# Patient Record
Sex: MALE | Employment: OTHER
[De-identification: names, ages, dates, MRNs, and addresses within clinical notes are randomized per-mention and may not be internally consistent; named-entity substitution may affect disease eponyms.]

---

## 2017-03-02 ENCOUNTER — RECORDS - HEALTHEAST (OUTPATIENT)
Dept: ADMINISTRATIVE | Facility: OTHER | Age: 77
End: 2017-03-02

## 2017-05-09 ENCOUNTER — RECORDS - HEALTHEAST (OUTPATIENT)
Dept: ADMINISTRATIVE | Facility: OTHER | Age: 77
End: 2017-05-09

## 2017-11-22 ENCOUNTER — TRANSFERRED RECORDS (OUTPATIENT)
Dept: HEALTH INFORMATION MANAGEMENT | Facility: CLINIC | Age: 77
End: 2017-11-22

## 2017-11-30 ENCOUNTER — HOME CARE/HOSPICE - HEALTHEAST (OUTPATIENT)
Dept: HOME HEALTH SERVICES | Facility: HOME HEALTH | Age: 77
End: 2017-11-30

## 2017-12-02 ENCOUNTER — HOME CARE/HOSPICE - HEALTHEAST (OUTPATIENT)
Dept: HOME HEALTH SERVICES | Facility: HOME HEALTH | Age: 77
End: 2017-12-02

## 2018-01-08 ENCOUNTER — RECORDS - HEALTHEAST (OUTPATIENT)
Dept: ADMINISTRATIVE | Facility: OTHER | Age: 78
End: 2018-01-08

## 2018-02-20 ENCOUNTER — RECORDS - HEALTHEAST (OUTPATIENT)
Dept: ADMINISTRATIVE | Facility: OTHER | Age: 78
End: 2018-02-20

## 2018-07-16 ENCOUNTER — MEDICAL CORRESPONDENCE (OUTPATIENT)
Dept: HEALTH INFORMATION MANAGEMENT | Facility: CLINIC | Age: 78
End: 2018-07-16

## 2018-07-16 ENCOUNTER — TRANSFERRED RECORDS (OUTPATIENT)
Dept: HEALTH INFORMATION MANAGEMENT | Facility: CLINIC | Age: 78
End: 2018-07-16

## 2018-08-20 PROBLEM — I10 HYPERTENSION: Status: ACTIVE | Noted: 2018-08-20

## 2018-08-20 PROBLEM — E78.00 HYPERCHOLESTEROLEMIA: Status: ACTIVE | Noted: 2018-08-20

## 2018-08-20 PROBLEM — Z82.0 FAMILY HISTORY OF PARKINSON DISEASE: Status: ACTIVE | Noted: 2018-08-20

## 2018-08-20 PROBLEM — R25.1 TREMOR: Status: ACTIVE | Noted: 2018-08-20

## 2018-08-20 PROBLEM — G20.A1 PARALYSIS AGITANS (H): Status: ACTIVE | Noted: 2018-08-20

## 2018-08-20 PROBLEM — F41.9 ANXIETY: Status: ACTIVE | Noted: 2018-08-20

## 2018-08-20 RX ORDER — METOPROLOL TARTRATE 25 MG/1
TABLET, FILM COATED ORAL
Qty: 60 TABLET | Refills: 1 | COMMUNITY
Start: 2018-08-20 | End: 2018-08-28

## 2018-08-20 RX ORDER — TAMSULOSIN HYDROCHLORIDE 0.4 MG/1
0.4 CAPSULE ORAL DAILY
Qty: 30 CAPSULE | COMMUNITY
Start: 2018-08-20 | End: 2018-08-28

## 2018-08-20 RX ORDER — CARBIDOPA/LEVODOPA 25MG-250MG
TABLET ORAL
Qty: 270 TABLET | Refills: 3 | COMMUNITY
Start: 2018-08-20 | End: 2018-08-28

## 2018-08-20 RX ORDER — ESCITALOPRAM OXALATE 20 MG/1
7.5 TABLET ORAL DAILY
Qty: 90 TABLET | Refills: 3 | COMMUNITY
Start: 2018-08-20 | End: 2018-08-28

## 2018-08-20 RX ORDER — OMEGA-3 FATTY ACIDS/FISH OIL 300-1000MG
1 CAPSULE ORAL DAILY
Qty: 90 CAPSULE | COMMUNITY
Start: 2018-08-20 | End: 2018-08-28

## 2018-08-20 RX ORDER — ROSUVASTATIN CALCIUM 10 MG/1
20 TABLET, COATED ORAL DAILY
Qty: 90 TABLET | Refills: 3 | COMMUNITY
Start: 2018-08-20 | End: 2018-08-28

## 2018-08-20 RX ORDER — ASPIRIN 325 MG
325 TABLET, DELAYED RELEASE (ENTERIC COATED) ORAL DAILY
Qty: 40 TABLET | COMMUNITY
Start: 2018-08-20 | End: 2018-08-28

## 2018-08-20 RX ORDER — RAMIPRIL 2.5 MG/1
5 CAPSULE ORAL DAILY
Qty: 60 CAPSULE | Refills: 1 | COMMUNITY
Start: 2018-08-20 | End: 2018-08-28

## 2018-08-20 RX ORDER — POTASSIUM CHLORIDE 750 MG/1
TABLET, EXTENDED RELEASE ORAL
Qty: 90 TABLET | Refills: 1 | COMMUNITY
Start: 2018-08-20 | End: 2018-08-28

## 2018-08-20 RX ORDER — FINASTERIDE 5 MG/1
5 TABLET, FILM COATED ORAL DAILY
Qty: 30 TABLET | Refills: 1 | COMMUNITY
Start: 2018-08-20 | End: 2018-08-28

## 2018-08-20 NOTE — PROGRESS NOTES
Summary and Recommendations:     Parkinson's disease  Left side onset    Family history of parkinson in father    Discussed   1. Dopamine scan (DaTSCAN)  2. Genetics - [email or web address] and next generation - Joel Morrissey, genetic counselor.  3. Medication class with Wendy Steve  4. Pharmacy consultation with Roxana Puga, Pharm D  5. Medium set up  6. Keshia krishnan  7. Discussed boxing, exercise research as well as big and loud therapy  8. He will remain on his sinemet at the present dose vs reducing to 1/2 tab 3/days  9. If he has not had an ECG checked recently may want this done to check his QTc interval  10. Return back to see me or Tatum Shankar NP in 3-6months.    Gomez Bingham MD  _____________________________________________________________________  PATIENT: Avi Bassettmulugeta  78 year old male   : 1940  BARTOLOME: 2018    Consult requested by Dr Foley  Outside records reviewed and revealed  - inserted.   History obtained from patient  History of Present Illness  79 yo left handed man with family history of parkinson  Presented with tremor and seemingly has improved on sinemet 25/250 3/day  Medications need to be confirmed in regards to dose.     Not clear if QTc checked    Denies eye problems  Wears glasses  Hearing is okay  Skin - no cancer  Mood - anxiety 0 better with lexapro  Sleep at night is okay  Goes to bathroom 3 times during the night.   Snores  He talks in his sleep  He never had good sense of smell  Had a nose fracture in football.   ?concussion  Blood pressure is well managed. Denies fainting but is lower today  Former smoker  No lung problems  Denies arthritis  Endo - on medication for cholesterol and fasting was okay  No recent thyroid or diabetes screen  Denies constipation or heartburn  Daytime - does not urinary urgency or frequency but does not have nocturia  msk   Renal stones  Renal function is reportedly okay  Heme - denies  Allergies to medications - denies  Has low back issues  - has  had injections for this and chiropractic manipulation, massage and other therapy for this.   He has had a faint in the past - not clear when - he went to the bathroom and thought he was going to vomit and fell. This was about 10 yrs ago.       Dr Foley noticed that he was dragging his left foot and thought it could be Parkinson  There was a reduction in left arm swing  Diagnosed about the 18th of April 2018  His voice reportedly not affected  He has smaller handwriting   Started on medication on the 18th of April   Tremors were intermittent initially and by noon they resolved without medication   With medication he has not had significant tremors most of the time.   Walking and arm swing are good and they walk a mile to 1.5 miles per day  He does monthly acupuncture, massage, boxing and walking, chiropractor.     They were referred from the family of Hever Darling - Natacha is a good friend of their daughter.     They are doing the boxing at [employer] in [place].   They had a difficulty finding boxing.   Wife has a friend in [facility] who has found boxing as helpful for her parkinson's disease symptoms.        Medications     7/730am noon 5-7pm             Aspirin 81mg 1       Carbidopa/levodopa sinemet 25/250 1 1 1     Escitalopram lexapro 20mg 1.5       Finasteride Proscar 5mg 1       Metoprolol tartrate lopressor 25mg  1       non formulary calm sleep powder as needed        Omega 3 1000mg  1       Potassium citrate 2  2     Ramipril altace 5 mg  1       Rosuvastatin crestor 20mg   1     Tamsulosin flomax 0.4mg capsule 1                                                                                                                     14 Review of systems  are negative except for   Patient Active Problem List   Diagnosis     Anxiety     Family history of Parkinson disease     Hypertension     Hypercholesterolemia     Tremor     Paralysis agitans (H)      No Known Allergies  History reviewed. No pertinent  surgical history.  Past Medical History:   Diagnosis Date     Anxiety 8/20/2018     Family history of Parkinson disease 8/20/2018     Hypercholesterolemia 8/20/2018     Hypertension 8/20/2018     Paralysis agitans (H) 8/20/2018     Tremor 8/20/2018     Social History     Social History     Marital status:      Spouse name: N/A     Number of children: N/A     Years of education: N/A     Occupational History     Not on file.     Social History Main Topics     Smoking status: Former Smoker     Smokeless tobacco: Never Used     Alcohol use Yes     Drug use: Not on file     Sexual activity: Not on file     Other Topics Concern     Not on file     Social History Narrative    . LIVES IN [place] [employer]     Family History   Problem Relation Age of Onset     HEART DISEASE Mother      Parkinsonism Father      Diabetes Brother      Current Outpatient Prescriptions   Medication Sig Dispense Refill     aspirin 325 MG EC tablet Take 1 tablet (325 mg) by mouth daily 40 tablet      aspirin 81 MG tablet Take 1 tablet (81 mg) by mouth daily 90 tablet 3     carbidopa-levodopa (SINEMET)  MG per tablet 25/250 TAB BY MOUTH 3/ tablet 3     escitalopram (LEXAPRO) 20 MG tablet Take 1 tablet (20 mg) by mouth daily 90 tablet 3     finasteride (PROSCAR) 5 MG tablet Take 1 tablet (5 mg) by mouth daily 30 tablet 1     metoprolol tartrate (LOPRESSOR) 25 MG tablet DOSE? 60 tablet 1     omega 3 1000 MG CAPS Take 1 g by mouth daily 90 capsule      potassium chloride (K-TAB,KLOR-CON) 10 MEQ tablet DOSE? 90 tablet 1     ramipril (ALTACE) 2.5 MG capsule DOSE? 60 capsule 1     rosuvastatin (CRESTOR) 10 MG tablet DOSE? 90 tablet 3     tamsulosin (FLOMAX) 0.4 MG capsule Take 1 capsule (0.4 mg) by mouth daily 30 capsule      Examination  B/P: Data Unavailable, T: Data Unavailable, P: Data Unavailable, R: Data Unavailable 0 lbs 0 oz  There were no vitals taken for this visit., There is no height or weight on file to  calculate BMI.    Vitals signs were added and reviewed if not above. Please refer to the chart from this visit.    General examination: well developed, nourished and normal affect  Carotid: No bruits. Chest CTA, Heart regular without gallops or murmurs. Abdomen soft nontender, no masses, bowel sounds intact. Periphery: normal pulses without edema. No skin lesions. MENTAL STATUS:  Alert, oriented x3.  Speech fluent with normal naming, repetition, comprehension.  Good right-left orientation, Can remember 1/3 objects.   5/5 on spelling world backwards.  CRANIAL NERVES:  Disks flat. Pupils are equal, round, reactive to light.  Normal vascularity and fields. Extraocular movements full.  Facial sensation and movement normal.  Hearing intact. Palate moves symmetrically.  Tongue midline.  Sternocleidomastoid and trapezius strength intact.  Neck strength was normal.  NEUROLOGIC:  Tone: slight increased tone and mild slowing. Motor in upper and lower extremities. 5/5.  Reflexes 0-1/4.  Toe signs downgoing.  Good finger-nose-finger, fine finger movement, heel-shin maneuver, sensation to light touch, position sense and vibration and temperature was normal. Gait normal except for slight reduced left arm swing. Romberg and postural stability intact.  Tremor - slight tremor

## 2018-08-21 ENCOUNTER — TELEPHONE (OUTPATIENT)
Dept: NEUROLOGY | Facility: CLINIC | Age: 78
End: 2018-08-21

## 2018-08-21 NOTE — TELEPHONE ENCOUNTER
Called Irena back and advised her that Dr. Bingham is okay with the patient waiting to be seen until doing the DATscan imaging.

## 2018-08-21 NOTE — TELEPHONE ENCOUNTER
[facility] Call Center    Phone Message    May a detailed message be left on voicemail: yes    Reason for Call: Order(s): Other:   Reason for requested: Irena needs confirmation about scheduling the order for NM Brain Image Tomographic(Spect) Datscan. She isn't sure if Dr. Bingham wants these tests done for new patients he has not seen before. They are extremely expensive tests and she just wants clarification if she is supposed to schedule these before patient is seen, or if Dr. Bingham is putting in these orders for a future date. She has to call out to get patient scheduled for the scan and patient's are confused about scheduling the test when they have not consulted about it. Please call both the patient and Irena back to confirm. Thank you!  Date needed: N/A  Provider name: Dr. Bingham      Action Taken: Message routed to:  [facility] ([facility] Neurology

## 2018-08-24 NOTE — TELEPHONE ENCOUNTER
FUTURE VISIT INFORMATION      FUTURE VISIT INFORMATION:    Date: 08/28/2018    Time: 9:10 AM     [place] [facility] [place] INFORMATION:    Referring provider:  DR. Foley    Referring providers clinic:      Reason for visit/diagnosis        NOTES (FOR ALL VISITS) STATUS DETAILS   OFFICE NOTE from referring provider Internal 07/16/2018   OFFICE NOTE from other specialist Internal 07/16/2018   DISCHARGE SUMMARY from hospital Care Everywhere 02/19/2014, 08/03/2013   DISCHARGE REPORT from the ER Care Everywhere 11/28/2017   OPERATIVE REPORT Care Everywhere 02/13/2014, 08/12/2013   MEDICATION LIST Internal    IMAGING  (FOR ALL VISITS)     EMG N/A    EEG N/A    ECT N/A    MRI (HEAD, NECK, SPINE) Care Everywhere  PACS  11/22/2017, 09/08/2014,    CT (HEAD, NECK, SPINE)   PACS   Care Everywhere 03/14/2015, 08/27/2014   OTHER

## 2018-08-28 ENCOUNTER — MYC MEDICAL ADVICE (OUTPATIENT)
Dept: NEUROLOGY | Facility: CLINIC | Age: 78
End: 2018-08-28

## 2018-08-28 ENCOUNTER — OFFICE VISIT (OUTPATIENT)
Dept: NEUROLOGY | Facility: CLINIC | Age: 78
End: 2018-08-28
Payer: COMMERCIAL

## 2018-08-28 ENCOUNTER — PRE VISIT (OUTPATIENT)
Dept: NEUROLOGY | Facility: CLINIC | Age: 78
End: 2018-08-28

## 2018-08-28 VITALS
OXYGEN SATURATION: 98 % | SYSTOLIC BLOOD PRESSURE: 100 MMHG | DIASTOLIC BLOOD PRESSURE: 59 MMHG | BODY MASS INDEX: 26.89 KG/M2 | HEART RATE: 68 BPM | TEMPERATURE: 97.6 F | WEIGHT: 202.9 LBS | HEIGHT: 73 IN

## 2018-08-28 DIAGNOSIS — G20.A1 PARALYSIS AGITANS (H): ICD-10-CM

## 2018-08-28 DIAGNOSIS — E78.00 HYPERCHOLESTEROLEMIA: ICD-10-CM

## 2018-08-28 DIAGNOSIS — F41.9 ANXIETY: ICD-10-CM

## 2018-08-28 DIAGNOSIS — G20.C PARKINSONISM, UNSPECIFIED PARKINSONISM TYPE (H): Primary | ICD-10-CM

## 2018-08-28 DIAGNOSIS — Z82.0 FAMILY HISTORY OF PARKINSON DISEASE: ICD-10-CM

## 2018-08-28 DIAGNOSIS — I10 HYPERTENSION, UNSPECIFIED TYPE: ICD-10-CM

## 2018-08-28 PROBLEM — M62.830 SPASM OF BACK MUSCLES: Status: ACTIVE | Noted: 2017-11-29

## 2018-08-28 PROBLEM — M46.1 SACROILIITIS (H): Status: ACTIVE | Noted: 2018-08-28

## 2018-08-28 PROBLEM — E78.9 LIPID DISORDER: Status: ACTIVE | Noted: 2017-02-26

## 2018-08-28 PROBLEM — M54.9 BACKACHE: Status: ACTIVE | Noted: 2017-11-29

## 2018-08-28 PROBLEM — M54.50 LOW BACK PAIN: Status: ACTIVE | Noted: 2017-11-29

## 2018-08-28 PROBLEM — E78.5 HYPERLIPIDEMIA: Status: ACTIVE | Noted: 2018-08-20

## 2018-08-28 RX ORDER — METOPROLOL SUCCINATE 25 MG/1
25 TABLET, EXTENDED RELEASE ORAL
COMMUNITY
Start: 2018-03-31 | End: 2018-08-28

## 2018-08-28 RX ORDER — FINASTERIDE 5 MG/1
TABLET, FILM COATED ORAL
Qty: 90 TABLET | Refills: 3 | COMMUNITY
Start: 2018-08-28

## 2018-08-28 RX ORDER — TAMSULOSIN HYDROCHLORIDE 0.4 MG/1
CAPSULE ORAL
Qty: 30 CAPSULE | COMMUNITY
Start: 2018-08-28 | End: 2020-01-01

## 2018-08-28 RX ORDER — POTASSIUM CITRATE 10 MEQ/1
TABLET, EXTENDED RELEASE ORAL DAILY
Refills: 3 | COMMUNITY
Start: 2018-08-15 | End: 2018-08-28

## 2018-08-28 RX ORDER — CARBIDOPA/LEVODOPA 25MG-250MG
TABLET ORAL
Qty: 270 TABLET | Refills: 3 | COMMUNITY
Start: 2018-08-28 | End: 2019-08-29

## 2018-08-28 RX ORDER — ESCITALOPRAM OXALATE 5 MG/1
TABLET ORAL
Qty: 135 TABLET | Refills: 3 | COMMUNITY
Start: 2018-08-28 | End: 2020-01-01

## 2018-08-28 RX ORDER — METOPROLOL SUCCINATE 25 MG/1
TABLET, EXTENDED RELEASE ORAL
Qty: 90 TABLET | Refills: 3 | COMMUNITY
Start: 2018-08-28 | End: 2019-02-28

## 2018-08-28 RX ORDER — OMEGA-3 FATTY ACIDS/FISH OIL 300-1000MG
CAPSULE ORAL
Qty: 90 CAPSULE | COMMUNITY
Start: 2018-08-28 | End: 2019-02-28

## 2018-08-28 RX ORDER — ROSUVASTATIN CALCIUM 20 MG/1
TABLET, COATED ORAL
COMMUNITY
Start: 2018-08-28

## 2018-08-28 RX ORDER — POTASSIUM CITRATE 10 MEQ/1
TABLET, EXTENDED RELEASE ORAL
Qty: 360 TABLET | Refills: 3 | COMMUNITY
Start: 2018-08-28

## 2018-08-28 RX ORDER — RAMIPRIL 5 MG/1
CAPSULE ORAL
Qty: 90 CAPSULE | Refills: 3 | COMMUNITY
Start: 2018-08-28 | End: 2019-08-29

## 2018-08-28 ASSESSMENT — ENCOUNTER SYMPTOMS
MEMORY LOSS: 1
SEIZURES: 0
PARALYSIS: 0
SPEECH CHANGE: 0
HEADACHES: 0
TINGLING: 0
DIZZINESS: 0
TREMORS: 1
NUMBNESS: 0
LOSS OF CONSCIOUSNESS: 0
DISTURBANCES IN COORDINATION: 0
WEAKNESS: 0

## 2018-08-28 ASSESSMENT — PAIN SCALES - GENERAL: PAINLEVEL: NO PAIN (0)

## 2018-08-28 NOTE — MR AVS SNAPSHOT
After Visit Summary   8/28/2018    Avi Villafana    MRN: [id number]           Patient Information     Date Of Birth          1940        Visit Information        Provider Department      8/28/2018 9:10 AM Gomez Bingham MD [facility] Neurology        Today's Diagnoses     Parkinsonism, unspecified Parkinsonism type (H)    -  1    Anxiety        Family history of Parkinson disease        Hypertension, unspecified type        Hypercholesterolemia        Paralysis agitans (H)          Care Instructions    Parkinson's disease  Left side onset    Family history of parkinson in father    Discussed   1. Dopamine scan (DaTSCAN)  2. Genetics - [email or web address] and next generation - Joel Morrissey, genetic counselor.  3. Medication class with Wendy Steve  4. Pharmacy consultation with Roxana Puga, Pharm D  5. BombBomb set up  6. Shaky putter  7. Discussed boxing, exercise research as well as big and loud therapy  8. He will remain on his sinemet at the present dose vs reducing to 1/2 tab 3/days  9. If he has not had an ECG checked recently may want this done to check his QTc interval  10. Return back to see me or Tatum Shankar NP in 3-6months.    Gomez Bingham MD          Follow-ups after your visit        Additional Services     DME Referral       Order for: Boxing for Parkinson            GENETICS REFERRAL       Your provider has referred you to: Joel Morrissey    Please be aware that coverage of these services is subject to the terms and limitations of your health insurance plan.  Call member services at your health plan with any benefit or coverage questions.      Please bring the following with you to your appointment:    (1) Any X-Rays, CTs or MRIs which have been performed.  Contact the facility where they were done to arrange for  prior to your scheduled appointment.   (2) List of current medications   (3) This referral request   (4) Any documents/labs given to you for this referral            MED THERAPY  MANAGE REFERRAL       Your provider has referred you to: megan lerma  Reason for Referral: Parkinson    The [place] department will contact you to schedule an appointment.  You may also schedule the appointment by calling [phone].  For [facility] patients, please call [phone] to confirm/schedule your appointment on the next business day.    This service is designed to help you get the most from your medications.  A specially trained Pharmacist will work closely with you and your providers to solve any questions, concerns, issues or problems related to your medications.    Please bring all of your prescription and non-prescription medications (such as vitamins, over-the-counter medications, and herbals) or a detailed medication list to your appointment.    If you have a glucose meter or other home monitoring information, please also bring this to your appointment (i.e. blood glucose log, blood pressure log, pain log, etc.).                  Follow-up notes from your care team     Return in about 6 months (around 2/28/2019).      Your next 10 appointments already scheduled     Feb 28, 2019 11:10 AM CST   (Arrive by 10:55 AM)   New Movement Disorder with Gomez Bingham MD   [facility] Neurology ([facility])    [address] [place] [id number] [phone]              Future tests that were ordered for you today     Open Future Orders        Priority Expected Expires Ordered    NM Brain Image Tomographic(Spect) Datscan Routine  8/28/2019 8/28/2018    NM Brain Image Tomographic(Spect) Datscan Routine  8/20/2019 8/20/2018            Who to contact     Please call your clinic at [phone] to:    Ask questions about your health    Make or cancel appointments    Discuss your medicines    Learn about your test results    Speak to your doctor            Additional Information About Your Visit        [facility]  "Information     T[employer]abl gives you secure access to your electronic health record. If you see a primary care provider, you can also send messages to your care team and make appointments. If you have questions, please call your primary care clinic.  If you do not have a primary care provider, please call [phone] and they will assist you.      Trendabl is an electronic gateway that provides easy, online access to your medical records. With [employer], you can request a clinic appointment, read your test results, renew a prescription or communicate with your care team.     To access your existing account, please contact your [facility] or call [phone] for assistance.        Care EveryWhere ID     This is your Care EveryWhere ID. This could be used by other organizations to access your [place] medical records  [id number]        Your Vitals Were     Pulse Temperature Height Pulse Oximetry BMI (Body Mass Index)       68 97.6  F (36.4  C) (Oral) 1.854 m (6' 1\") 98% 26.77 kg/m2        Blood Pressure from Last 3 Encounters:   08/28/18 100/59    Weight from Last 3 Encounters:   08/28/18 92 kg (202 lb 14.4 oz)              We Performed the Following     DME Referral     GENETICS REFERRAL     MED THERAPY MANAGE REFERRAL          Today's Medication Changes          These changes are accurate as of 8/28/18 10:38 AM.  If you have any questions, ask your nurse or doctor.               These medicines have changed or have updated prescriptions.        Dose/Directions    aspirin 81 MG tablet   This may have changed:    - how much to take  - how to take this  - when to take this  - additional instructions  - Another medication with the same name was removed. Continue taking this medication, and follow the directions you see here.   Changed by:  Gomez Bingham MD        81mg tab by mouth daily @ 7/730am   Quantity:  90 tablet   Refills:  3       CRESTOR 20 MG tablet   This may have changed:  "   - medication strength  - how much to take  - how to take this  - when to take this  - additional instructions   Generic drug:  rosuvastatin   Changed by:  Gomez Bingham MD        20mg tab by mouth nightly @ 5-7pm   Refills:  0       escitalopram 5 MG tablet   Commonly known as:  LEXAPRO   This may have changed:    - medication strength  - how much to take  - how to take this  - when to take this  - additional instructions   Changed by:  Gomez Bingham MD        1.5 x 5mg tabs by mouth daily @ 7am  (7.5mg dose/day)   Quantity:  135 tablet   Refills:  3       finasteride 5 MG tablet   Commonly known as:  PROSCAR   This may have changed:    - how much to take  - how to take this  - when to take this  - additional instructions   Changed by:  Gomez Bingham MD        5mg tab by mouth daily @ 7am   Quantity:  90 tablet   Refills:  3       FLOMAX 0.4 MG capsule   This may have changed:    - how much to take  - how to take this  - when to take this  - additional instructions   Generic drug:  tamsulosin   Changed by:  Gomez Bingham MD        0.4mg capsule by mouth daily @ 7/730am   Quantity:  30 capsule   Refills:  0       metoprolol succinate 25 MG 24 hr tablet   Commonly known as:  TOPROL-XL   This may have changed:    - how much to take  - how to take this  - additional instructions   Changed by:  Gomez Bingham MD        25mg tab by mouth daily @ 7am   Quantity:  90 tablet   Refills:  3       omega 3 1000 MG Caps   This may have changed:    - how much to take  - how to take this  - when to take this  - additional instructions   Changed by:  Gomez Bingham MD        1000mg capsule by mouth daily @ 7am   Quantity:  90 capsule   Refills:  0       potassium citrate 10 MEQ (1080 MG) CR tablet   Commonly known as:  UROCIT-K   This may have changed:    - when to take this  - additional instructions   Changed by:  Gomez Bingham MD        2 tabs by mouth twice daily @ 7am and 5-7pm  = 4/day    Quantity:  360 tablet   Refills:  3       ramipril 5 MG capsule   Commonly known as:  ALTACE   This may have changed:    - medication strength  - how much to take  - how to take this  - when to take this  - additional instructions   Changed by:  Gomez Bingham MD        5mg capsule by mouth daily @ 7am   Quantity:  90 capsule   Refills:  3       SINEMET  MG per tablet   This may have changed:  additional instructions   Used for:  Parkinsonism, unspecified Parkinsonism type (H)   Generic drug:  carbidopa-levodopa   Changed by:  Gomez Bingham MD        25/250 TAB BY MOUTH 3/DAY @ 7am, noon and 5-7pm   Quantity:  270 tablet   Refills:  3         Stop taking these medicines if you haven't already. Please contact your care team if you have questions.     metoprolol tartrate 25 MG tablet   Commonly known as:  LOPRESSOR   Stopped by:  Gomez Bingham MD           potassium chloride 10 MEQ tablet   Commonly known as:  K-TAB,KLOR-CON   Stopped by:  Gomez Bingham MD                    Primary Care Provider Office Phone # Fax #    Jed CRAWLEY MD Lizeth [phone]       [facility] [address]  [facility] [address]        Equal Access to Services     [facility] AH: Hadii aad ku hadasho Soomaali, waaxda luqadaha, qaybta kaalmada adeegyada, waxay idiin hayaan adeeg romeoarageorgie nick . So [facility] [phone].    ATENCIÓN: Si habla español, tiene a diaz disposición servicios gratuitos de asistencia lingüística. [facility] [phone].    We comply with applicable federal civil rights laws and [place] laws. We do not discriminate on the basis of race, color, national origin, age, disability, sex, sexual orientation, or gender identity.            Thank you!     Thank you for choosing [facility] NEUROLOGY  for your care. Our goal is always to provide you with excellent care. Hearing back from our patients is one way we can continue to improve our services. Please take a few  minutes to complete the written survey that you may receive in the mail after your visit with us. Thank you!             Your Updated Medication List - Protect others around you: Learn how to safely use, store and throw away your medicines at www.disposemymeds.org.          This list is accurate as of 8/28/18 10:38 AM.  Always use your most recent med list.                   Brand Name Dispense Instructions for use Diagnosis    aspirin 81 MG tablet     90 tablet    81mg tab by mouth daily @ 7/730am        CRESTOR 20 MG tablet   Generic drug:  rosuvastatin      20mg tab by mouth nightly @ 5-7pm        escitalopram 5 MG tablet    LEXAPRO    135 tablet    1.5 x 5mg tabs by mouth daily @ 7am  (7.5mg dose/day)        finasteride 5 MG tablet    PROSCAR    90 tablet    5mg tab by mouth daily @ 7am        FLOMAX 0.4 MG capsule   Generic drug:  tamsulosin     30 capsule    0.4mg capsule by mouth daily @ 7/730am        metoprolol succinate 25 MG 24 hr tablet    TOPROL-XL    90 tablet    25mg tab by mouth daily @ 7am        NONFORMULARY      Calm powder at night as needed for sleep        omega 3 1000 MG Caps     90 capsule    1000mg capsule by mouth daily @ 7am        potassium citrate 10 MEQ (1080 MG) CR tablet    UROCIT-K    360 tablet    2 tabs by mouth twice daily @ 7am and 5-7pm  = 4/day        ramipril 5 MG capsule    ALTACE    90 capsule    5mg capsule by mouth daily @ 7am        SINEMET  MG per tablet   Generic drug:  carbidopa-levodopa     270 tablet    25/250 TAB BY MOUTH 3/DAY @ 7am, noon and 5-7pm    Parkinsonism, unspecified Parkinsonism type (H)

## 2018-08-28 NOTE — NURSING NOTE
Chief Complaint   Patient presents with     Consult     [facility] NEW - MOVEMENT DISORDER     Everett Pickett, EMT

## 2018-08-28 NOTE — PATIENT INSTRUCTIONS
Parkinson's disease  Left side onset    Family history of parkinson in father    Discussed   1. Dopamine scan (DaTSCAN)  2. Genetics - [email or web address] and next generation - Joel Morrissey, genetic counselor.  3. Medication class with Wendy Steve  4. Pharmacy consultation with Roxana Puga, Pharm D  5. Scribz set up  6. Keshia krishnan  7. Discussed boxing, exercise research as well as big and loud therapy  8. He will remain on his sinemet at the present dose vs reducing to 1/2 tab 3/days  9. If he has not had an ECG checked recently may want this done to check his QTc interval  10. Return back to see me or Tatum Shankar NP in 3-6months.    Gomez Bingham MD

## 2018-08-28 NOTE — LETTER
2018       RE: Avi Villafana  [address] [facility] [address]     Dear Colleague,    Thank you for referring your patient, Avi Villafana, to the [facility] NEUROLOGY at [facility]. Please see a copy of my visit note below.    Summary and Recommendations:     Parkinson's disease  Left side onset    Family history of parkinson in father    Discussed   1. Dopamine scan (DaTSCAN)  2. Genetics - [email or web address] and next generation - Joel Morrissey, genetic counselor.  3. Medication class with Wendy Steve  4. Pharmacy consultation with Roxana Puga, Pharm D  5. eSolar set up  6. Shaky putter  7. Discussed boxing, exercise research as well as big and loud therapy  8. He will remain on his sinemet at the present dose vs reducing to 1/2 tab 3/days  9. If he has not had an ECG checked recently may want this done to check his QTc interval  10. Return back to see me or Tatum Shankar NP in 3-6months.    Gomez Bingham MD  _____________________________________________________________________  PATIENT: Avi Villafana  78 year old male   : 1940  BARTOLOME: 2018    Consult requested by Dr Foley  Outside records reviewed and revealed  - inserted.   History obtained from patient  History of Present Illness  77 yo left handed man with family history of parkinson  Presented with tremor and seemingly has improved on sinemet 25/250 3/day  Medications need to be confirmed in regards to dose.     Not clear if QTc checked    Denies eye problems  Wears glasses  Hearing is okay  Skin - no cancer  Mood - anxiety 0 better with lexapro  Sleep at night is okay  Goes to bathroom 3 times during the night.   Snores  He talks in his sleep  He never had good sense of smell  Had a nose fracture in football.   ?concussion  Blood pressure is well managed. Denies fainting but is lower today  Former smoker  No lung problems  Denies arthritis  Endo - on medication for cholesterol and fasting was okay  No  recent thyroid or diabetes screen  Denies constipation or heartburn  Daytime - does not urinary urgency or frequency but does not have nocturia  msk   Renal stones  Renal function is reportedly okay  Heme - denies  Allergies to medications - denies  Has low back issues  - has had injections for this and chiropractic manipulation, massage and other therapy for this.   He has had a faint in the past - not clear when - he went to the bathroom and thought he was going to vomit and fell. This was about 10 yrs ago.       Dr Foley noticed that he was dragging his left foot and thought it could be Parkinson  There was a reduction in left arm swing  Diagnosed about the 18th of April 2018  His voice reportedly not affected  He has smaller handwriting   Started on medication on the 18th of April   Tremors were intermittent initially and by noon they resolved without medication   With medication he has not had significant tremors most of the time.   Walking and arm swing are good and they walk a mile to 1.5 miles per day  He does monthly acupuncture, massage, boxing and walking, chiropractor.     They were referred from the family of Hever Darling - Natacha is a good friend of their daughter.     They are doing the boxing at [employer] in [place].   They had a difficulty finding boxing.   Wife has a friend in [facility] who has found boxing as helpful for her parkinson's disease symptoms.        Medications     7/730am noon 5-7pm             Aspirin 81mg 1       Carbidopa/levodopa sinemet 25/250 1 1 1     Escitalopram lexapro 20mg 1.5       Finasteride Proscar 5mg 1       Metoprolol tartrate lopressor 25mg  1       non formulary calm sleep powder as needed        Omega 3 1000mg  1       Potassium citrate 2  2     Ramipril altace 5 mg  1       Rosuvastatin crestor 20mg   1     Tamsulosin flomax 0.4mg capsule 1                                                                                                                      14 Review of systems  are negative except for   Patient Active Problem List   Diagnosis     Anxiety     Family history of Parkinson disease     Hypertension     Hypercholesterolemia     Tremor     Paralysis agitans (H)      No Known Allergies  History reviewed. No pertinent surgical history.  Past Medical History:   Diagnosis Date     Anxiety 8/20/2018     Family history of Parkinson disease 8/20/2018     Hypercholesterolemia 8/20/2018     Hypertension 8/20/2018     Paralysis agitans (H) 8/20/2018     Tremor 8/20/2018     Social History     Social History     Marital status:      Spouse name: N/A     Number of children: N/A     Years of education: N/A     Occupational History     Not on file.     Social History Main Topics     Smoking status: Former Smoker     Smokeless tobacco: Never Used     Alcohol use Yes     Drug use: Not on file     Sexual activity: Not on file     Other Topics Concern     Not on file     Social History Narrative    . LIVES IN [place]. [employer]     Family History   Problem Relation Age of Onset     HEART DISEASE Mother      Parkinsonism Father      Diabetes Brother      Current Outpatient Prescriptions   Medication Sig Dispense Refill     aspirin 325 MG EC tablet Take 1 tablet (325 mg) by mouth daily 40 tablet      aspirin 81 MG tablet Take 1 tablet (81 mg) by mouth daily 90 tablet 3     carbidopa-levodopa (SINEMET)  MG per tablet 25/250 TAB BY MOUTH 3/ tablet 3     escitalopram (LEXAPRO) 20 MG tablet Take 1 tablet (20 mg) by mouth daily 90 tablet 3     finasteride (PROSCAR) 5 MG tablet Take 1 tablet (5 mg) by mouth daily 30 tablet 1     metoprolol tartrate (LOPRESSOR) 25 MG tablet DOSE? 60 tablet 1     omega 3 1000 MG CAPS Take 1 g by mouth daily 90 capsule      potassium chloride (K-TAB,KLOR-CON) 10 MEQ tablet DOSE? 90 tablet 1     ramipril (ALTACE) 2.5 MG capsule DOSE? 60 capsule 1     rosuvastatin (CRESTOR) 10 MG tablet DOSE? 90 tablet 3      tamsulosin (FLOMAX) 0.4 MG capsule Take 1 capsule (0.4 mg) by mouth daily 30 capsule      Examination  B/P: Data Unavailable, T: Data Unavailable, P: Data Unavailable, R: Data Unavailable 0 lbs 0 oz  There were no vitals taken for this visit., There is no height or weight on file to calculate BMI.    Vitals signs were added and reviewed if not above. Please refer to the chart from this visit.    General examination: well developed, nourished and normal affect  Carotid: No bruits. Chest CTA, Heart regular without gallops or murmurs. Abdomen soft nontender, no masses, bowel sounds intact. Periphery: normal pulses without edema. No skin lesions. MENTAL STATUS:  Alert, oriented x3.  Speech fluent with normal naming, repetition, comprehension.  Good right-left orientation, Can remember 1/3 objects.   5/5 on spelling world backwards.  CRANIAL NERVES:  Disks flat. Pupils are equal, round, reactive to light.  Normal vascularity and fields. Extraocular movements full.  Facial sensation and movement normal.  Hearing intact. Palate moves symmetrically.  Tongue midline.  Sternocleidomastoid and trapezius strength intact.  Neck strength was normal.  NEUROLOGIC:  Tone: slight increased tone and mild slowing. Motor in upper and lower extremities. 5/5.  Reflexes 0-1/4.  Toe signs downgoing.  Good finger-nose-finger, fine finger movement, heel-shin maneuver, sensation to light touch, position sense and vibration and temperature was normal. Gait normal except for slight reduced left arm swing. Romberg and postural stability intact.  Tremor - slight tremor            Again, thank you for allowing me to participate in the care of your patient.      Sincerely,    Gomez Bingham MD

## 2018-08-28 NOTE — Clinical Note
2018       RE: Avi Villafana  [address] [facility] [address]     Dear Colleague,    Thank you for referring your patient, Avi Villafana, to the [facility] NEUROLOGY at [facility]. Please see a copy of my visit note below.    Summary and Recommendations:         Gomez Bingham MD  _____________________________________________________________________  PATIENT: Avi Villafana  78 year old male   : 1940  BARTOLOME: 2018    Consult requested by Dr Foley        Outside records reviewed and revealed  - inserted.       History obtained from patient      History of Present Illness  77 yo man with family history of parkinson  Presented with tremor and seemingly has improved on sinemet 25/250 3/day  Medications need to be confirmed in regards to dose.         Medications                                                                                                                                                                                                                  14 Review of systems  are negative except for   Patient Active Problem List   Diagnosis     Anxiety     Family history of Parkinson disease     Hypertension     Hypercholesterolemia     Tremor     Paralysis agitans (H)      No Known Allergies  History reviewed. No pertinent surgical history.  Past Medical History:   Diagnosis Date     Anxiety 2018     Family history of Parkinson disease 2018     Hypercholesterolemia 2018     Hypertension 2018     Paralysis agitans (H) 2018     Tremor 2018     Social History     Social History     Marital status:      Spouse name: N/A     Number of children: N/A     Years of education: N/A     Occupational History     Not on file.     Social History Main Topics     Smoking status: Former Smoker     Smokeless tobacco: Never Used     Alcohol use Yes     Drug use: Not on file     Sexual activity: Not on file     Other Topics  Concern     Not on file     Social History Narrative    . LIVES IN [place]. [employer]     Family History   Problem Relation Age of Onset     HEART DISEASE Mother      Parkinsonism Father      Diabetes Brother      Current Outpatient Prescriptions   Medication Sig Dispense Refill     aspirin 325 MG EC tablet Take 1 tablet (325 mg) by mouth daily 40 tablet      aspirin 81 MG tablet Take 1 tablet (81 mg) by mouth daily 90 tablet 3     carbidopa-levodopa (SINEMET)  MG per tablet 25/250 TAB BY MOUTH 3/ tablet 3     escitalopram (LEXAPRO) 20 MG tablet Take 1 tablet (20 mg) by mouth daily 90 tablet 3     finasteride (PROSCAR) 5 MG tablet Take 1 tablet (5 mg) by mouth daily 30 tablet 1     metoprolol tartrate (LOPRESSOR) 25 MG tablet DOSE? 60 tablet 1     omega 3 1000 MG CAPS Take 1 g by mouth daily 90 capsule      potassium chloride (K-TAB,KLOR-CON) 10 MEQ tablet DOSE? 90 tablet 1     ramipril (ALTACE) 2.5 MG capsule DOSE? 60 capsule 1     rosuvastatin (CRESTOR) 10 MG tablet DOSE? 90 tablet 3     tamsulosin (FLOMAX) 0.4 MG capsule Take 1 capsule (0.4 mg) by mouth daily 30 capsule      Examination  B/P: Data Unavailable, T: Data Unavailable, P: Data Unavailable, R: Data Unavailable 0 lbs 0 oz  There were no vitals taken for this visit., There is no height or weight on file to calculate BMI.    Vitals signs were added and reviewed if not above. Please refer to the chart from this visit.    General examination: well developed, nourished and normal affect  Carotid: No bruits. Chest CTA, Heart regular without gallops or murmurs. Abdomen soft nontender, no masses, bowel sounds intact. Periphery: normal pulses without edema. No skin lesions. MENTAL STATUS:  Alert, oriented x3.  Speech fluent with normal naming, repetition, comprehension.  Good right-left orientation, Can remember ***/3 objects.   CRANIAL NERVES:  Disks flat. Pupils are equal, round, reactive to light.  Normal vascularity and fields.  Extraocular movements full.  Facial sensation and movement normal.  Hearing intact. Palate moves symmetrically.  Tongue midline.  Sternocleidomastoid and trapezius strength intact.  Neck strength was normal.  NEUROLOGIC:  Tone: ***. Motor in upper and lower extremities. 5/5.  Reflexes 2/4.  Toe signs downgoing.  Good finger-nose-finger, fine finger movement, heel-shin maneuver, sensation to light touch, position sense and vibration and temperature was normal. Gait normal except for ***. Romberg and postural stability *** Tremor ***            Again, thank you for allowing me to participate in the care of your patient.      Sincerely,    Gomez Bingham MD

## 2018-09-07 ENCOUNTER — OFFICE VISIT (OUTPATIENT)
Dept: PHARMACY | Facility: CLINIC | Age: 78
End: 2018-09-07
Payer: COMMERCIAL

## 2018-09-07 VITALS
RESPIRATION RATE: 16 BRPM | BODY MASS INDEX: 26.77 KG/M2 | TEMPERATURE: 98.1 F | SYSTOLIC BLOOD PRESSURE: 126 MMHG | OXYGEN SATURATION: 96 % | HEIGHT: 73 IN | DIASTOLIC BLOOD PRESSURE: 90 MMHG | WEIGHT: 202 LBS | HEART RATE: 59 BPM

## 2018-09-07 DIAGNOSIS — N40.0 BENIGN PROSTATIC HYPERPLASIA, UNSPECIFIED WHETHER LOWER URINARY TRACT SYMPTOMS PRESENT: ICD-10-CM

## 2018-09-07 DIAGNOSIS — Z87.442 HISTORY OF RENAL CALCULI: ICD-10-CM

## 2018-09-07 DIAGNOSIS — F41.9 ANXIETY: ICD-10-CM

## 2018-09-07 DIAGNOSIS — E78.5 HYPERLIPIDEMIA, UNSPECIFIED HYPERLIPIDEMIA TYPE: ICD-10-CM

## 2018-09-07 DIAGNOSIS — G20.A1 PARALYSIS AGITANS (H): Primary | ICD-10-CM

## 2018-09-07 DIAGNOSIS — I10 ESSENTIAL HYPERTENSION: ICD-10-CM

## 2018-09-07 PROCEDURE — 99605 MTMS BY PHARM NP 15 MIN: CPT | Performed by: PHARMACIST

## 2018-09-07 PROCEDURE — 99607 MTMS BY PHARM ADDL 15 MIN: CPT | Performed by: PHARMACIST

## 2018-09-07 RX ORDER — POTASSIUM CITRATE 10 MEQ/1
20 TABLET, EXTENDED RELEASE ORAL 2 TIMES DAILY
COMMUNITY
End: 2018-09-07

## 2018-09-07 ASSESSMENT — PAIN SCALES - GENERAL: PAINLEVEL: NO PAIN (0)

## 2018-09-07 NOTE — PROGRESS NOTES
SUBJECTIVE/OBJECTIVE:                           Avi Villafana is a 78 year old male coming in for an initial visit for Medication Therapy Management.  He was referred to me from Dr. Bingham. Wife, Louisa, is present for the visit.    Chief Complaint: Initial MTM visit    Allergies/ADRs: Reviewed in Epic  Tobacco: History of tobacco dependence - quit 1983  Alcohol: 1-3 beverages / week  Caffeine: 1 cups/day of coffee  Activity: walking a mile/day and boxing twice/day and golfing  PMH: Reviewed in Epic    Medication Adherence/Access:  Patient uses pill box(es).  Patient takes medications 3 time(s) per day.   Per patient, misses medication 0 times per week.   Medication barriers: none.   The patient fills medications at [place]: NO, fills medications at [facility] in [place].    Parkinson's Disease:  Current medications include: Carbidopa-levodopa  mg 3 times daily at 7 am, noon, 5-7 pm. Pt reports that symptoms of PD are improved. He was diagnosed with PD earlier this year and initially was started on Sinemet at this dose. The only symptoms he had were a slight tremor in the mornings, but never after noon. With the start of Sinemet his tremor has essentially resolved. He denies hallucinations, dyskinesias, or nausea.     Anxiety:  Current medications include: Escitalopram 7.5 mg once daily. Pt reports that anxiety improved after starting this medication a few years ago. It has essentially eliminated the panic attacks he was having. He also does massage, acupuncture, and chiropractor once a month and all of these therapies have been helpful. His last EKG was 2/26/17 and showed QTc of 460.    Hyperlipidemia: Current therapy includes rosuvastatin 20 mg once daily and fish oil 1000 mg daily.  Pt reports no significant myalgias or other side effects. He reports eating fish 2-3 times per week. He also takes aspirin 81 mg daily.      Hypertension: Current medications include ramipril 5 mg daily and metoprolol XL 25 mg  "daily.  Patient does self-monitor BP occasionally and states that at home his SBP is typically in the 120s but in clinic is usually in the 90s-100s.  Patient reports some symptoms of orthostatic hypotension when standing up quickly.  BP Readings from Last 3 Encounters:   09/07/18 126/90   08/28/18 100/59     BPH: Currently taking tamsulosin 0.4 mg daily and finasteride 5 mg daily. States that his urinary flow and symptoms have been stable. He follows with urology.    H/o kidney stones: Currently taking potassium citrate 10 mEq 2 tablets twice daily. He states that his urologist recommended this, along with drinking Crystal Light. Last K+ was 4.0 on 11/15/17.    Today's Vitals: /90  Pulse 59  Temp 98.1  F (36.7  C) (Oral)  Resp 16  Ht 6' 1\" (1.854 m)  Wt 202 lb (91.6 kg)  SpO2 96%  BMI 26.65 kg/m2      ASSESSMENT:                             Current medications were reviewed today.     Medication Adherence: excellent, no issues identified    Parkinson's Disease:  Stable. Patient is on a relatively high dose of Sinemet given his mild symptoms and quick response to the medication. Could consider a reduction in the Sinemet dose, though Dr. Bingham opted not to do this at their last clinic visit. Will discuss the merits of reducing the dose with Dr. Bingham.     Anxiety: Stable.     Hyperlipidemia: Needs improvement. The fish oil supplement seems unnecessary given that he is taking a statin and frequently eats fish as a part of his diet.    Hypertension: Stable. Patient may benefit from continuing to monitor BP and may need a reduction in his antihypertensives if he is symptomatically low.    BPH: Stable.    H/o kidney stones: Stable.    PLAN:                            1. Patient to consider stopping fish oil supplement.  2. Patient to continue monitoring BP and to follow up with provider if symptomatic or if SBP readings are consistently less than 90.   3. MTM pharmacist to discuss with Dr. Bingham the merits of " reducing his Sinemet dose.    I spent 60 minutes with this patient today. All changes were made via collaborative practice agreement with Dr. Bingham. A copy of the visit note was provided to the patient's referring provider.    Will follow up in 6 months: 2/28/19.    The patient was given a summary of these recommendations as an after visit summary.     Roxana Puga, Pharm.D.  Medication Therapy Management Pharmacist  Phone: [phone]

## 2018-09-07 NOTE — MR AVS SNAPSHOT
After Visit Summary   9/7/2018    Avi Villafana    MRN: [id number]           Patient Information     Date Of Birth          1940        Visit Information        Provider Department      9/7/2018 12:30 PM Roxana Puga [facility] [place]        Care Instructions    Recommendations from today's MTM visit:                                                    MTM (medication therapy management) is a service provided by a clinical pharmacist designed to help you get the most of out of your medicines.     1. I will talk with Dr. Bingham about reducing your carbidopa-levodopa, but for now we'll keep your dose the same.    2. You can consider stopping the fish oil supplement.    3. Keep an eye on your blood pressure and if it's dropping or you're getting light-headed, let us or your cardiologist know.     Next MTM visit: 2/28/18 at 10:30 am    To schedule another MTM appointment, please call the clinic directly or you may call the MTM scheduling line at [phone] or toll-free at [phone].     My Clinical Pharmacist's contact information:                                                      It was a pleasure seeing you today!  Please feel free to contact me with any questions or concerns you have.      Roxana Puga, Pharm.D.  Medication Therapy Management Pharmacist  Phone: [phone]    You may receive a survey about the MTM services you received.  I would appreciate your feedback to help me serve you better in the future. Please fill it out and return it when you can. Your comments will be anonymous.            Follow-ups after your visit        Your next 10 appointments already scheduled     Feb 28, 2019 10:30 AM CST   (Arrive by 10:15 AM)   SHORT with Roxana Puga [facility] MTM ([facility] [place])    [address] [address] [phone]            Feb 28, 2019 11:10 AM CST   (Arrive by 10:55  "AM)   New Movement Disorder with Gomez Bingham MD   [facility] ([facility])    [address] [facility]  [address] [facility] [address] [phone]              Who to contact     If you have questions or need follow up information about today's clinic visit or your schedule please contact [facility] [place] directly at [phone].  Normal or non-critical lab and imaging results will be communicated to you by Kidlandiahart, letter or phone within 4 business days after the clinic has received the results. If you do not hear from us within 7 days, please contact the clinic through Kidlandiahart or phone. If you have a critical or abnormal lab result, we will notify you by phone as soon as possible.  Submit refill requests through YeePay or call your pharmacy and they will forward the refill request to us. Please allow 3 business days for your refill to be completed.          Additional Information About Your Visit        [email or web address] Information     YeePay gives you secure access to your electronic health record. If you see a primary care provider, you can also send messages to your care team and make appointments. If you have questions, please call your primary care clinic.  If you do not have a primary care provider, please call [phone] and they will assist you.        Care EveryWhere ID     This is your Care EveryWhere ID. This could be used by other organizations to access your [facility] medical records  [id number]        Your Vitals Were     Pulse Temperature Respirations Height Pulse Oximetry BMI (Body Mass Index)    59 98.1  F (36.7  C) (Oral) 16 6' 1\" (1.854 m) 96% 26.65 kg/m2       Blood Pressure from Last 3 Encounters:   09/07/18 126/90   08/28/18 100/59    Weight from Last 3 Encounters:   09/07/18 202 lb (91.6 kg)   08/28/18 202 lb 14.4 oz (92 kg)              Today, you had the following     No orders found for display       Primary Care Provider Office Phone " # Fax #    Jed Stanley [phone]       [facility] MED SPEC [address]  [facility] [address]        Equal Access to Services     DARLEENCANDACE MEET : Hadii genna ku eduardoo Solianneali, waaxda luqadaha, qaybta kaalmada adeegyada, new flemingani rhonda. So [facility] [phone].    ATENCIÓN: Si habla español, tiene a diaz disposición servicios gratuitos de asistencia lingüística. Llame al [phone].    We comply with applicable federal civil rights laws and [place] laws. We do not discriminate on the basis of race, color, national origin, age, disability, sex, sexual orientation, or gender identity.            Thank you!     Thank you for choosing [facility]UROLOG[facility]IN[facility]  for your care. Our goal is always to provide you with excellent care. Hearing back from our patients is one way we can continue to improve our services. Please take a few minutes to complete the written survey that you may receive in the mail after your visit with us. Thank you!             Your Updated Medication List - Protect others around you: Learn how to safely use, store and throw away your medicines at www.disposemymeds.org.          This list is accurate as of 9/7/18  1:28 PM.  Always use your most recent med list.                   Brand Name Dispense Instructions for use Diagnosis    aspirin 81 MG tablet     90 tablet    81mg tab by mouth daily @ 7/730am        CRESTOR 20 MG tablet   Generic drug:  rosuvastatin      20mg tab by mouth nightly @ 5-7pm        escitalopram 5 MG tablet    LEXAPRO    135 tablet    1.5 x 5mg tabs by mouth daily @ 7am  (7.5mg dose/day)        finasteride 5 MG tablet    PROSCAR    90 tablet    5mg tab by mouth daily @ 7am        FLOMAX 0.4 MG capsule   Generic drug:  tamsulosin     30 capsule    0.4mg capsule by mouth daily @ 7/730am        metoprolol succinate 25 MG 24 hr tablet    TOPROL-XL    90 tablet    25mg tab by mouth daily @ 7am        NONFORMULARY       Calm powder at night as needed for sleep        omega 3 1000 MG Caps     90 capsule    1000mg capsule by mouth daily @ 7am        * potassium citrate 10 MEQ (1080 MG) CR tablet    UROCIT-K     Take 20 mEq by mouth 2 times daily        * potassium citrate 10 MEQ (1080 MG) CR tablet    UROCIT-K    360 tablet    2 tabs by mouth twice daily @ 7am and 5-7pm  = 4/day        ramipril 5 MG capsule    ALTACE    90 capsule    5mg capsule by mouth daily @ 7am        SINEMET  MG per tablet   Generic drug:  carbidopa-levodopa     270 tablet    25/250 TAB BY MOUTH 3/DAY @ 7am, noon and 5-7pm    Parkinsonism, unspecified Parkinsonism type (H)       * Notice:  This list has 2 medication(s) that are the same as other medications prescribed for you. Read the directions carefully, and ask your doctor or other care provider to review them with you.

## 2018-09-07 NOTE — PATIENT INSTRUCTIONS
Recommendations from today's MTM visit:                                                    MTM (medication therapy management) is a service provided by a clinical pharmacist designed to help you get the most of out of your medicines.     1. I will talk with Dr. Bingham about reducing your carbidopa-levodopa, but for now we'll keep your dose the same.    2. You can consider stopping the fish oil supplement.    3. Keep an eye on your blood pressure and if it's dropping or you're getting light-headed, let us or your cardiologist know.     Next MTM visit: 2/28/18 at 10:30 am    To schedule another MTM appointment, please call the clinic directly or you may call the MTM scheduling line at [phone] or toll-free at [phone].     My Clinical Pharmacist's contact information:                                                      It was a pleasure seeing you today!  Please feel free to contact me with any questions or concerns you have.      Roxana Puga, Pharm.D.  Medication Therapy Management Pharmacist  Phone: [phone]    You may receive a survey about the MTM services you received.  I would appreciate your feedback to help me serve you better in the future. Please fill it out and return it when you can. Your comments will be anonymous.

## 2018-09-11 ENCOUNTER — RECORDS - HEALTHEAST (OUTPATIENT)
Dept: ADMINISTRATIVE | Facility: OTHER | Age: 78
End: 2018-09-11

## 2018-09-24 ENCOUNTER — TELEPHONE (OUTPATIENT)
Dept: NEUROLOGY | Facility: CLINIC | Age: 78
End: 2018-09-24

## 2018-09-24 NOTE — TELEPHONE ENCOUNTER
Health Call Center    Phone Message    May a detailed message be left on voicemail: no    Reason for Call: Other: Pt's wife Louisa called to inform Wendy that they do intend to come to the 10/8 Parkinson's class starting at 10am. Please call back for any questions.    Action Taken: Message routed to:  [facility]

## 2018-09-24 NOTE — TELEPHONE ENCOUNTER
Left voice mail inviting he and his wife to the 10/8 Intro to Parkinson's disease class from 10-12 at the [facility]. Asked him to call back to confirm if he will be coming.

## 2019-02-21 NOTE — PROGRESS NOTES
Diagnosis/Summary/Recommendations:    PATIENT: Avi Villafana  79 year old male     : 1940    BARTOLOME: 2019    HAS SOME ANXIETY  Sitting around and thinking about things.   Dr Stanley started the escitalopram  He had been on 2 in the past and is now on 1.5 tabs  He dos not like to talk on the phone.     He had problems with breathing.     He had counseling and did some hypnosis.     He had a nasal fracture from football. He had problems breathing.     Joel    HE HAS HAD SOME FORGETFULNESS.    Doing boxing ([employer]), acupuncture, word scape, sodoku, chiropractic and massage.   And other activities    Did the Parkinson walk.     Was in [place] 2 weeks and walked on the beach  Rained the time there.   Had a great time.     There has been cold temperatures.         Medications     7/730am noon 5-7pm                     Aspirin 81mg 1           Carbidopa/levodopa sinemet 25/250 1 1 1       Escitalopram lexapro 5mg 1.5           Finasteride Proscar 5mg 1           Metoprolol succinate TOPROL XL 25mg  24 hr tablet 1/2           non formulary calm sleep powder       RARE      Potassium citrate 2   2       Ramipril altace 5 mg  1           Rosuvastatin crestor 20mg     1       Tamsulosin flomax 0.4mg capsule 1                                                                                                                                                      history obtained from patient     Has nocturia 3/noc  Will be seeing urology  Discussed reducing night time water - less to n one after 6pm  He has not been on mirabegron    He has RBD episodes which are rare. He does not know when he is going to have them. He has not tried melatonin.   Discussed 3 or 5mg tab of melatonin one hour before bedtime.     No change in medications at this point.    Louisa Parks has access to his records per his consent.     Return back.     Coding statement:   Duration of  Services: patient care and care  coordination was 25 minutes  Greater than 50% of this visit was spent in counseling and coordination of care.     Gomez Bingham MD     ______________________________________    Last visit date and details:      Parkinson's disease  Left side onset     Family history of parkinson in father     Discussed   1. Dopamine scan (DaTSCAN)  2. Genetics - [email or web address] and next generation - Joel Morrissey, genetic counselor.  3. Medication class with Wendy Steve  4. Pharmacy consultation with Roxana Puga, Pharm D  5. [employer] set up  6. Keshia mcdonaldter  7. Discussed boxing, exercise research as well as big and loud therapy  8. He will remain on his sinemet at the present dose vs reducing to 1/2 tab 3/days  9. If he has not had an ECG checked recently may want this done to check his QTc interval  10. Return back to see me or Tatum Shankar NP in 3-6months.     Gomez Bingham MD  _____________________________________________________________________  PATIENT: Avi Villafana  78 year old male   : 1940  BARTOLOME: 2018     Consult requested by Dr Foley  Outside records reviewed and revealed  - inserted.   History obtained from patient  History of Present Illness  77 yo left handed man with family history of parkinson  Presented with tremor and seemingly has improved on sinemet 25/250 3/day  Medications need to be confirmed in regards to dose.      Not clear if QTc checked     Denies eye problems  Wears glasses  Hearing is okay  Skin - no cancer  Mood - anxiety 0 better with lexapro  Sleep at night is okay  Goes to bathroom 3 times during the night.   Snores  He talks in his sleep  He never had good sense of smell  Had a nose fracture in football.   ?concussion  Blood pressure is well managed. Denies fainting but is lower today  Former smoker  No lung problems  Denies arthritis  Endo - on medication for cholesterol and fasting was okay  No recent thyroid or diabetes screen  Denies constipation or heartburn  Daytime - does not urinary  urgency or frequency but does not have nocturia  msk   Renal stones  Renal function is reportedly okay  Heme - denies  Allergies to medications - denies  Has low back issues  - has had injections for this and chiropractic manipulation, massage and other therapy for this.   He has had a faint in the past - not clear when - he went to the bathroom and thought he was going to vomit and fell. This was about 10 yrs ago.      Dr Foley noticed that he was dragging his left foot and thought it could be Parkinson  There was a reduction in left arm swing  Diagnosed about the 18th of April 2018  His voice reportedly not affected  He has smaller handwriting   Started on medication on the 18th of April   Tremors were intermittent initially and by noon they resolved without medication   With medication he has not had significant tremors most of the time.   Walking and arm swing are good and they walk a mile to 1.5 miles per day  He does monthly acupuncture, massage, boxing and walking, chiropractor.      They were referred from the family of Hever Darling - Natacha is a good friend of their daughter.      They are doing the boxing at [employer] in [place].   They had a difficulty finding boxing.   Wife has a friend in [facility] who has found boxing as helpful for her parkinson's disease symptoms.          Medications     7/730am noon 5-7pm                     Aspirin 81mg 1           Carbidopa/levodopa sinemet 25/250 1 1 1       Escitalopram lexapro 20mg 1.5           Finasteride Proscar 5mg 1           Metoprolol tartrate lopressor 25mg  1           non formulary calm sleep powder as needed             Omega 3 1000mg  1           Potassium citrate 2   2       Ramipril altace 5 mg  1           Rosuvastatin crestor 20mg     1       Tamsulosin flomax 0.4mg capsule 1                                                                                                                                                                                                     ______________________________________      Patient was asked about 14 Review of systems including changes in vision (dry eyes, double vision), hearing, heart, lungs, musculoskeletal, depression, anxiety, snoring, RBD, insomnia, urinary frequency, urinary urgency, constipation, swallowing problems, hematological, ID, allergies, skin problems: seborrhea, endocrinological: thyroid, diabetes, cholesterol; balance, weight changes, and other neurological problems and these were not significant at this time except for   Patient Active Problem List   Diagnosis     Anxiety     Family history of Parkinson disease     Essential hypertension     Hyperlipidemia     Tremor     Paralysis agitans (H)     ACP (advance care planning)     ASHD (arteriosclerotic heart disease)     Backache     Chest pain     Chronic rhinitis     Deviated septum     Lateral epicondylitis of elbow     GERD (gastroesophageal reflux disease)     Inguinal hernia recurrent unilateral     Leg cramps     Lipid disorder     Orthostatic hypotension     Low back pain     Other diseases of respiratory system, not elsewhere classified     Sacroiliitis (H)     Spasm of back muscles     Spinal headache     Syncope        No Known Allergies  Past Surgical History:   Procedure Laterality Date     cervical spine surgery  1990    c5/6 neck      HERNIA REPAIR Bilateral      LITHOTRIPSY      renal stones     NOSE SURGERY       Past Medical History:   Diagnosis Date     Anxiety 8/20/2018     Family history of Parkinson disease 8/20/2018     Hypercholesterolemia 8/20/2018     Hypertension 8/20/2018     Paralysis agitans (H) 8/20/2018     Tremor 8/20/2018     Social History     Socioeconomic History     Marital status:      Spouse name: Not on file     Number of children: Not on file     Years of education: Not on file     Highest education level: Not on file   Occupational History     Not on file   Social Needs     Financial resource  strain: Not on file     Food insecurity:     Worry: Not on file     Inability: Not on file     Transportation needs:     Medical: Not on file     Non-medical: Not on file   Tobacco Use     Smoking status: Former Smoker     Smokeless tobacco: Never Used     Tobacco comment: 1983   Substance and Sexual Activity     Alcohol use: Yes     Comment: 2-3 PER WEEK     Drug use: No     Sexual activity: Not on file   Lifestyle     Physical activity:     Days per week: Not on file     Minutes per session: Not on file     Stress: Not on file   Relationships     Social connections:     Talks on phone: Not on file     Gets together: Not on file     Attends Jain service: Not on file     Active member of club or organization: Not on file     Attends meetings of clubs or organizations: Not on file     Relationship status: Not on file     Intimate partner violence:     Fear of current or ex partner: Not on file     Emotionally abused: Not on file     Physically abused: Not on file     Forced sexual activity: Not on file   Other Topics Concern     Not on file   Social History Narrative    . LIVES IN [place]. 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  Medications and Vitals not listed above were documented in the cart and reviewed by me.     Current Outpatient Medications   Medication Sig Dispense Refill     aspirin 81 MG tablet 81mg tab by mouth daily @ 7/730am 90 tablet 3     carbidopa-levodopa (SINEMET)  MG per tablet 25/250 TAB BY MOUTH 3/DAY @ 7am, noon and 5-7pm 270 tablet 3     escitalopram (LEXAPRO) 5 MG  tablet 1.5 x 5mg tabs by mouth daily @ 7am  (7.5mg dose/day) 135 tablet 3     finasteride (PROSCAR) 5 MG tablet 5mg tab by mouth daily @ 7am 90 tablet 3     metoprolol succinate (TOPROL-XL) 25 MG 24 hr tablet 25mg tab by mouth daily @ 7am 90 tablet 3     NONFORMULARY Calm powder at night as needed for sleep       omega 3 1000 MG CAPS 1000mg capsule by mouth daily @ 7am 90 capsule      potassium citrate (UROCIT-K) 10 MEQ (1080 MG) CR tablet 2 tabs by mouth twice daily @ 7am and 5-7pm  = 4/day 360 tablet 3     ramipril (ALTACE) 5 MG capsule 5mg capsule by mouth daily @ 7am 90 capsule 3     rosuvastatin (CRESTOR) 20 MG tablet 20mg tab by mouth nightly @ 5-7pm       tamsulosin (FLOMAX) 0.4 MG capsule 0.4mg capsule by mouth daily @ 7/730am 30 capsule          Gomez Bingham MD

## 2019-02-28 ENCOUNTER — OFFICE VISIT (OUTPATIENT)
Dept: NEUROLOGY | Facility: CLINIC | Age: 79
End: 2019-02-28
Payer: MEDICARE

## 2019-02-28 VITALS
WEIGHT: 206 LBS | SYSTOLIC BLOOD PRESSURE: 111 MMHG | DIASTOLIC BLOOD PRESSURE: 56 MMHG | HEIGHT: 73 IN | OXYGEN SATURATION: 97 % | HEART RATE: 53 BPM | BODY MASS INDEX: 27.3 KG/M2 | RESPIRATION RATE: 16 BRPM | TEMPERATURE: 98 F

## 2019-02-28 DIAGNOSIS — G20.C PARKINSONISM, UNSPECIFIED PARKINSONISM TYPE (H): Primary | ICD-10-CM

## 2019-02-28 PROBLEM — G20.A1 PARKINSON DISEASE (H): Status: ACTIVE | Noted: 2018-09-11

## 2019-02-28 RX ORDER — CHLORAL HYDRATE 500 MG
1000 CAPSULE ORAL
COMMUNITY
End: 2019-08-29

## 2019-02-28 RX ORDER — CLOTRIMAZOLE AND BETAMETHASONE DIPROPIONATE 10; .64 MG/G; MG/G
CREAM TOPICAL
Refills: 1 | COMMUNITY
Start: 2018-10-29 | End: 2019-02-28

## 2019-02-28 RX ORDER — METOPROLOL SUCCINATE 25 MG/1
TABLET, EXTENDED RELEASE ORAL
Qty: 90 TABLET | Refills: 3 | COMMUNITY
Start: 2019-02-28 | End: 2019-08-29

## 2019-02-28 ASSESSMENT — MIFFLIN-ST. JEOR: SCORE: 1713.29

## 2019-02-28 ASSESSMENT — PAIN SCALES - GENERAL: PAINLEVEL: NO PAIN (0)

## 2019-02-28 NOTE — LETTER
"[facility]  [facility] NEUROLOGY  [address] [id number] [phone]            2019          Dear Kait Proxy Patient,    We received a request to activate you as a proxy for another patient of [facility] or [facility].  In order to do so, we need to activate your [employer] account as well.    Your access code is: [unfilled]      Please access the [employer] website:  -  [employer] [email or web address]  -  [employer] [email or web address].    Below the ID and password fields, select the \"Sign Up Now\" as New User.  You will be prompted to enter the access code listed above as well as additional personal information.  Please follow the directions carefully when creating your username and password.    Once your account is activated, you can access the proxy accounts under \"Shared Medical Records\".    If you allow your access code to , or if you have any questions please call a [employer] Representative during normal clinic hours.     Sincerely,        [employer] Customer Service    "

## 2019-02-28 NOTE — LETTER
2019    RE: Avi Villafana  [address] [facility] [address]       Diagnosis/Summary/Recommendations:    PATIENT: Avi Villafana  79 year old male     : 1940    BARTOLOME: 2019    HAS SOME ANXIETY  Sitting around and thinking about things.   Dr Stanley started the escitalopram  He had been on 2 in the past and is now on 1.5 tabs  He dos not like to talk on the phone.     He had problems with breathing.     He had counseling and did some hypnosis.     He had a nasal fracture from football. He had problems breathing.     Joel    HE HAS HAD SOME FORGETFULNESS.    Doing boxing ([employer]), acupuncture, word scape, sodoku, chiropractic and massage.   And other activities    Did the Parkinson walk.     Was in [place] 2 weeks and walked on the beach  Rained the time there.   Had a great time.     There has been cold temperatures.         Medications     7/730am noon 5-7pm                     Aspirin 81mg 1           Carbidopa/levodopa sinemet 25/250 1 1 1       Escitalopram lexapro 5mg 1.5           Finasteride Proscar 5mg 1           Metoprolol succinate TOPROL XL 25mg  24 hr tablet 1/2           non formulary calm sleep powder       RARE      Potassium citrate 2   2       Ramipril altace 5 mg  1           Rosuvastatin crestor 20mg     1       Tamsulosin flomax 0.4mg capsule 1                                                                                                                                                      history obtained from patient     Has nocturia 3/noc  Will be seeing urology  Discussed reducing night time water - less to n one after 6pm  He has not been on mirabegron    He has RBD episodes which are rare. He does not know when he is going to have them. He has not tried melatonin.   Discussed 3 or 5mg tab of melatonin one hour before bedtime.     No change in medications at this point.    Louisa Kasey has access to his records per his consent.     Return  back.     Coding statement:   Duration of  Services: patient care and care coordination was 25 minutes  Greater than 50% of this visit was spent in counseling and coordination of care.     Gomez Bingham MD     ______________________________________    Last visit date and details:      Parkinson's disease  Left side onset     Family history of parkinson in father     Discussed   1. Dopamine scan (DaTSCAN)  2. Genetics - [email or web address] and next generation - Joel Morrissey, genetic counselor.  3. Medication class with Wendy Steve  4. Pharmacy consultation with Roxana Puga, Pharm D  5. [facility] set up  6. Keshia mcdonaldter  7. Discussed boxing, exercise research as well as big and loud therapy  8. He will remain on his sinemet at the present dose vs reducing to 1/2 tab 3/days  9. If he has not had an ECG checked recently may want this done to check his QTc interval  10. Return back to see me or Tatum Shankar NP in 3-6months.     Gomez Bingham MD  _____________________________________________________________________  PATIENT: Avi Villafana  78 year old male   : 1940  BARTOLOME: 2018     Consult requested by Dr Foley  Outside records reviewed and revealed  - inserted.   History obtained from patient  History of Present Illness  77 yo left handed man with family history of parkinson  Presented with tremor and seemingly has improved on sinemet 25/250 3/day  Medications need to be confirmed in regards to dose.      Not clear if QTc checked     Denies eye problems  Wears glasses  Hearing is okay  Skin - no cancer  Mood - anxiety 0 better with lexapro  Sleep at night is okay  Goes to bathroom 3 times during the night.   Snores  He talks in his sleep  He never had good sense of smell  Had a nose fracture in football.   ?concussion  Blood pressure is well managed. Denies fainting but is lower today  Former smoker  No lung problems  Denies arthritis  Endo - on medication for cholesterol and fasting was okay  No recent thyroid or  diabetes screen  Denies constipation or heartburn  Daytime - does not urinary urgency or frequency but does not have nocturia  msk   Renal stones  Renal function is reportedly okay  Heme - denies  Allergies to medications - denies  Has low back issues  - has had injections for this and chiropractic manipulation, massage and other therapy for this.   He has had a faint in the past - not clear when - he went to the bathroom and thought he was going to vomit and fell. This was about 10 yrs ago.      Dr Foley noticed that he was dragging his left foot and thought it could be Parkinson  There was a reduction in left arm swing  Diagnosed about the 18th of April 2018  His voice reportedly not affected  He has smaller handwriting   Started on medication on the 18th of April   Tremors were intermittent initially and by noon they resolved without medication   With medication he has not had significant tremors most of the time.   Walking and arm swing are good and they walk a mile to 1.5 miles per day  He does monthly acupuncture, massage, boxing and walking, chiropractor.      They were referred from the family of Hever Manzano is a good friend of their daughter.      They are doing the boxing at [employer] in [place].   They had a difficulty finding boxing.   Wife has a friend in [facility] who has found boxing as helpful for her parkinson's disease symptoms.          Medications     7/730am noon 5-7pm                     Aspirin 81mg 1           Carbidopa/levodopa sinemet 25/250 1 1 1       Escitalopram lexapro 20mg 1.5           Finasteride Proscar 5mg 1           Metoprolol tartrate lopressor 25mg  1           non formulary calm sleep powder as needed             Omega 3 1000mg  1           Potassium citrate 2   2       Ramipril altace 5 mg  1           Rosuvastatin crestor 20mg     1       Tamsulosin flomax 0.4mg capsule 1                                                                                                                                                                                                     ______________________________________      Patient was asked about 14 Review of systems including changes in vision (dry eyes, double vision), hearing, heart, lungs, musculoskeletal, depression, anxiety, snoring, RBD, insomnia, urinary frequency, urinary urgency, constipation, swallowing problems, hematological, ID, allergies, skin problems: seborrhea, endocrinological: thyroid, diabetes, cholesterol; balance, weight changes, and other neurological problems and these were not significant at this time except for   Patient Active Problem List   Diagnosis     Anxiety     Family history of Parkinson disease     Essential hypertension     Hyperlipidemia     Tremor     Paralysis agitans (H)     ACP (advance care planning)     ASHD (arteriosclerotic heart disease)     Backache     Chest pain     Chronic rhinitis     Deviated septum     Lateral epicondylitis of elbow     GERD (gastroesophageal reflux disease)     Inguinal hernia recurrent unilateral     Leg cramps     Lipid disorder     Orthostatic hypotension     Low back pain     Other diseases of respiratory system, not elsewhere classified     Sacroiliitis (H)     Spasm of back muscles     Spinal headache     Syncope        No Known Allergies  Past Surgical History:   Procedure Laterality Date     cervical spine surgery  1990    c5/6 neck      HERNIA REPAIR Bilateral      LITHOTRIPSY      renal stones     NOSE SURGERY       Past Medical History:   Diagnosis Date     Anxiety 8/20/2018     Family history of Parkinson disease 8/20/2018     Hypercholesterolemia 8/20/2018     Hypertension 8/20/2018     Paralysis agitans (H) 8/20/2018     Tremor 8/20/2018     Social History     Socioeconomic History     Marital status:      Spouse name: Not on file     Number of children: Not on file     Years of education: Not on file     Highest education level: Not on file    Occupational History     Not on file   Social Needs     Financial resource strain: Not on file     Food insecurity:     Worry: Not on file     Inability: Not on file     Transportation needs:     Medical: Not on file     Non-medical: Not on file   Tobacco Use     Smoking status: Former Smoker     Smokeless tobacco: Never Used     Tobacco comment: 1983   Substance and Sexual Activity     Alcohol use: Yes     Comment: 2-3 PER WEEK     Drug use: No     Sexual activity: Not on file   Lifestyle     Physical activity:     Days per week: Not on file     Minutes per session: Not on file     Stress: Not on file   Relationships     Social connections:     Talks on phone: Not on file     Gets together: Not on file     Attends Denominational service: Not on file     Active member of club or organization: Not on file     Attends meetings of clubs or organizations: Not on file     Relationship status: Not on file     Intimate partner violence:     Fear of current or ex partner: Not on file     Emotionally abused: Not on file     Physically abused: Not on file     Forced sexual activity: Not on file   Other Topics Concern     Not on file   Social History Narrative    . LIVES IN [place]. 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  Medications and Vitals not listed above were documented in the cart and reviewed by me.     Current Outpatient Medications   Medication Sig Dispense Refill     aspirin 81 MG tablet 81mg tab by mouth daily @ 7/730am 90 tablet 3     carbidopa-levodopa (SINEMET)  MG per tablet 25/250 TAB BY MOUTH  3/DAY @ 7am, noon and 5-7pm 270 tablet 3     escitalopram (LEXAPRO) 5 MG tablet 1.5 x 5mg tabs by mouth daily @ 7am  (7.5mg dose/day) 135 tablet 3     finasteride (PROSCAR) 5 MG tablet 5mg tab by mouth daily @ 7am 90 tablet 3     metoprolol succinate (TOPROL-XL) 25 MG 24 hr tablet 25mg tab by mouth daily @ 7am 90 tablet 3     NONFORMULARY Calm powder at night as needed for sleep       omega 3 1000 MG CAPS 1000mg capsule by mouth daily @ 7am 90 capsule      potassium citrate (UROCIT-K) 10 MEQ (1080 MG) CR tablet 2 tabs by mouth twice daily @ 7am and 5-7pm  = 4/day 360 tablet 3     ramipril (ALTACE) 5 MG capsule 5mg capsule by mouth daily @ 7am 90 capsule 3     rosuvastatin (CRESTOR) 20 MG tablet 20mg tab by mouth nightly @ 5-7pm       tamsulosin (FLOMAX) 0.4 MG capsule 0.4mg capsule by mouth daily @ 7/730am 30 capsule        Gomez Bingham MD

## 2019-02-28 NOTE — Clinical Note
2019       RE: Avi Villaafna  [address] [facility] [address]     Dear Colleague,    Thank you for referring your patient, Avi Villafana, to the [facility] NEUROLOGY at [facility]. Please see a copy of my visit note below.    Diagnosis/Summary/Recommendations:    PATIENT: Avi Villafana  79 year old male     : 1940    BARTOLOME: 2019    ***    Medications                                                                                                                                                History obtained from {PATIENT. FAMILY:964301}      Coding statement:   Duration of  Services: patient care and care coordination was *** minutes  Greater than 50% of this visit was spent in counseling and coordination of care.     Gomez Bingham MD     ______________________________________    Last visit date and details:      Parkinson's disease  Left side onset     Family history of parkinson in father     Discussed   1. Dopamine scan (DaTSCAN)  2. Genetics - [email or web address] and next generation - Joel Morrissey, genetic counselor.  3. Medication class with Wendy Steve  4. Pharmacy consultation with Roxana Puga, Pharm D  [facility]. [facility]  6. Shaky putter  7. Discussed boxing, exercise research as well as big and loud therapy  8. He will remain on his sinemet at the present dose vs reducing to 1/2 tab 3/days  9. If he has not had an ECG checked recently may want this done to check his QTc interval  10. Return back to see me or Tatum Shankar NP in 3-6months.     Gomez Bingham MD  _____________________________________________________________________  PATIENT: Avi Villafana  78 year old male   : 1940  BARTOLOME: 2018     Consult requested by Dr Foley  Outside records reviewed and revealed  - inserted.   History obtained from patient  History of Present Illness  77 yo left handed man with family history of parkinson  Presented with tremor and seemingly has  improved on sinemet 25/250 3/day  Medications need to be confirmed in regards to dose.      Not clear if QTc checked     Denies eye problems  Wears glasses  Hearing is okay  Skin - no cancer  Mood - anxiety 0 better with lexapro  Sleep at night is okay  Goes to bathroom 3 times during the night.   Snores  He talks in his sleep  He never had good sense of smell  Had a nose fracture in football.   ?concussion  Blood pressure is well managed. Denies fainting but is lower today  Former smoker  No lung problems  Denies arthritis  Endo - on medication for cholesterol and fasting was okay  No recent thyroid or diabetes screen  Denies constipation or heartburn  Daytime - does not urinary urgency or frequency but does not have nocturia  msk   Renal stones  Renal function is reportedly okay  Heme - denies  Allergies to medications - denies  Has low back issues  - has had injections for this and chiropractic manipulation, massage and other therapy for this.   He has had a faint in the past - not clear when - he went to the bathroom and thought he was going to vomit and fell. This was about 10 yrs ago.         Dr Foley noticed that he was dragging his left foot and thought it could be Parkinson  There was a reduction in left arm swing  Diagnosed about the 18th of April 2018  His voice reportedly not affected  He has smaller handwriting   Started on medication on the 18th of April   Tremors were intermittent initially and by noon they resolved without medication   With medication he has not had significant tremors most of the time.   Walking and arm swing are good and they walk a mile to 1.5 miles per day  He does monthly acupuncture, massage, boxing and walking, chiropractor.      They were referred from the family of Hever Darling Ho Manzano is a good friend of their daughter.      They are doing the boxing at [employer] in [place].   They had a difficulty finding boxing.   Wife has a friend in [facility] who has found  boxing as helpful for her parkinson's disease symptoms.          Medications     7/730am noon 5-7pm                     Aspirin 81mg 1           Carbidopa/levodopa sinemet 25/250 1 1 1       Escitalopram lexapro 20mg 1.5           Finasteride Proscar 5mg 1           Metoprolol tartrate lopressor 25mg  1           non formulary calm sleep powder as needed             Omega 3 1000mg  1           Potassium citrate 2   2       Ramipril altace 5 mg  1           Rosuvastatin crestor 20mg     1       Tamsulosin flomax 0.4mg capsule 1                                                                                                                                                                                                            ______________________________________      Patient was asked about 14 Review of systems including changes in vision (dry eyes, double vision), hearing, heart, lungs, musculoskeletal, depression, anxiety, snoring, RBD, insomnia, urinary frequency, urinary urgency, constipation, swallowing problems, hematological, ID, allergies, skin problems: seborrhea, endocrinological: thyroid, diabetes, cholesterol; balance, weight changes, and other neurological problems and these were not significant at this time except for   Patient Active Problem List   Diagnosis     Anxiety     Family history of Parkinson disease     Essential hypertension     Hyperlipidemia     Tremor     Paralysis agitans (H)     ACP (advance care planning)     ASHD (arteriosclerotic heart disease)     Backache     Chest pain     Chronic rhinitis     Deviated septum     Lateral epicondylitis of elbow     GERD (gastroesophageal reflux disease)     Inguinal hernia recurrent unilateral     Leg cramps     Lipid disorder     Orthostatic hypotension     Low back pain     Other diseases of respiratory system, not elsewhere classified     Sacroiliitis (H)     Spasm of back muscles     Spinal headache     Syncope        No Known  Allergies  Past Surgical History:   Procedure Laterality Date     cervical spine surgery  1990    c5/6 neck      HERNIA REPAIR Bilateral      LITHOTRIPSY      renal stones     NOSE SURGERY       Past Medical History:   Diagnosis Date     Anxiety 8/20/2018     Family history of Parkinson disease 8/20/2018     Hypercholesterolemia 8/20/2018     Hypertension 8/20/2018     Paralysis agitans (H) 8/20/2018     Tremor 8/20/2018     Social History     Socioeconomic History     Marital status:      Spouse name: Not on file     Number of children: Not on file     Years of education: Not on file     Highest education level: Not on file   Occupational History     Not on file   Social Needs     Financial resource strain: Not on file     Food insecurity:     Worry: Not on file     Inability: Not on file     Transportation needs:     Medical: Not on file     Non-medical: Not on file   Tobacco Use     Smoking status: Former Smoker     Smokeless tobacco: Never Used     Tobacco comment: 1983   Substance and Sexual Activity     Alcohol use: Yes     Comment: 2-3 PER WEEK     Drug use: No     Sexual activity: Not on file   Lifestyle     Physical activity:     Days per week: Not on file     Minutes per session: Not on file     Stress: Not on file   Relationships     Social connections:     Talks on phone: Not on file     Gets together: Not on file     Attends Yazdanism service: Not on file     Active member of club or organization: Not on file     Attends meetings of clubs or organizations: Not on file     Relationship status: Not on file     Intimate partner violence:     Fear of current or ex partner: Not on file     Emotionally abused: Not on file     Physically abused: Not on file     Forced sexual activity: Not on file   Other Topics Concern     Not on file   Social History Narrative    . LIVES IN [place]. 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  Medications and Vitals not listed above were documented in the cart and reviewed by me.     Current Outpatient Medications   Medication Sig Dispense Refill     aspirin 81 MG tablet 81mg tab by mouth daily @ 7/730am 90 tablet 3     carbidopa-levodopa (SINEMET)  MG per tablet 25/250 TAB BY MOUTH 3/DAY @ 7am, noon and 5-7pm 270 tablet 3     escitalopram (LEXAPRO) 5 MG tablet 1.5 x 5mg tabs by mouth daily @ 7am  (7.5mg dose/day) 135 tablet 3     finasteride (PROSCAR) 5 MG tablet 5mg tab by mouth daily @ 7am 90 tablet 3     metoprolol succinate (TOPROL-XL) 25 MG 24 hr tablet 25mg tab by mouth daily @ 7am 90 tablet 3     NONFORMULARY Calm powder at night as needed for sleep       omega 3 1000 MG CAPS 1000mg capsule by mouth daily @ 7am 90 capsule      potassium citrate (UROCIT-K) 10 MEQ (1080 MG) CR tablet 2 tabs by mouth twice daily @ 7am and 5-7pm  = 4/day 360 tablet 3     ramipril (ALTACE) 5 MG capsule 5mg capsule by mouth daily @ 7am 90 capsule 3     rosuvastatin (CRESTOR) 20 MG tablet 20mg tab by mouth nightly @ 5-7pm       tamsulosin (FLOMAX) 0.4 MG capsule 0.4mg capsule by mouth daily @ 7/730am 30 capsule          Gomez Bingham MD    Again, thank you for allowing me to participate in the care of your patient.      Sincerely,    Gomez Bingham MD

## 2019-02-28 NOTE — PATIENT INSTRUCTIONS
Medications     7/730am noon 5-7pm                     Aspirin 81mg 1           Carbidopa/levodopa sinemet 25/250 1 1 1       Escitalopram lexapro 5mg 1.5           Finasteride Proscar 5mg 1           Metoprolol succinate TOPROL XL 25mg  24 hr tablet 1/2           non formulary calm sleep powder       RARE      Potassium citrate 2   2       Ramipril altace 5 mg  1           Rosuvastatin crestor 20mg     1       Tamsulosin flomax 0.4mg capsule 1                                                                                                                                                      history obtained from patient     Has nocturia 3/noc  Will be seeing urology  Discussed reducing night time water - less to n one after 6pm  He has not been on mirabegron    He has RBD episodes which are rare. He does not know when he is going to have them. He has not tried melatonin.   Discussed 3 or 5mg tab of melatonin one hour before bedtime.     No change in medications at this point.    Louisa Rajan has access to his records per his consent.     Return back.

## 2019-08-06 ASSESSMENT — MIFFLIN-ST. JEOR: SCORE: 1660.2

## 2019-08-07 ENCOUNTER — SURGERY - HEALTHEAST (OUTPATIENT)
Dept: GASTROENTEROLOGY | Facility: CLINIC | Age: 79
End: 2019-08-07

## 2019-08-08 ENCOUNTER — COMMUNICATION - HEALTHEAST (OUTPATIENT)
Dept: LAB | Facility: CLINIC | Age: 79
End: 2019-08-08

## 2019-08-08 ENCOUNTER — COMMUNICATION - HEALTHEAST (OUTPATIENT)
Dept: SCHEDULING | Facility: CLINIC | Age: 79
End: 2019-08-08

## 2019-08-08 ENCOUNTER — COMMUNICATION - HEALTHEAST (OUTPATIENT)
Dept: CARE COORDINATION | Facility: CLINIC | Age: 79
End: 2019-08-08

## 2019-08-09 ENCOUNTER — RECORDS - HEALTHEAST (OUTPATIENT)
Dept: ADMINISTRATIVE | Facility: OTHER | Age: 79
End: 2019-08-09

## 2019-08-09 ENCOUNTER — OFFICE VISIT - HEALTHEAST (OUTPATIENT)
Dept: FAMILY MEDICINE | Facility: CLINIC | Age: 79
End: 2019-08-09

## 2019-08-09 ENCOUNTER — COMMUNICATION - HEALTHEAST (OUTPATIENT)
Dept: TELEHEALTH | Facility: CLINIC | Age: 79
End: 2019-08-09

## 2019-08-09 DIAGNOSIS — G20.A1 PARKINSON'S DISEASE (H): ICD-10-CM

## 2019-08-09 DIAGNOSIS — C15.9 ADENOCARCINOMA OF ESOPHAGUS (H): ICD-10-CM

## 2019-08-09 ASSESSMENT — MIFFLIN-ST. JEOR: SCORE: 1652.48

## 2019-08-12 ENCOUNTER — AMBULATORY - HEALTHEAST (OUTPATIENT)
Dept: LAB | Facility: CLINIC | Age: 79
End: 2019-08-12

## 2019-08-12 ENCOUNTER — AMBULATORY - HEALTHEAST (OUTPATIENT)
Dept: FAMILY MEDICINE | Facility: CLINIC | Age: 79
End: 2019-08-12

## 2019-08-12 ENCOUNTER — AMBULATORY - HEALTHEAST (OUTPATIENT)
Dept: ONCOLOGY | Facility: HOSPITAL | Age: 79
End: 2019-08-12

## 2019-08-12 ENCOUNTER — COMMUNICATION - HEALTHEAST (OUTPATIENT)
Dept: ONCOLOGY | Facility: HOSPITAL | Age: 79
End: 2019-08-12

## 2019-08-12 DIAGNOSIS — D50.0 IRON DEFICIENCY ANEMIA DUE TO CHRONIC BLOOD LOSS: ICD-10-CM

## 2019-08-12 LAB
BASOPHILS # BLD AUTO: 0 THOU/UL (ref 0–0.2)
BASOPHILS NFR BLD AUTO: 0 % (ref 0–2)
EOSINOPHIL # BLD AUTO: 0.2 THOU/UL (ref 0–0.4)
EOSINOPHIL NFR BLD AUTO: 3 % (ref 0–6)
ERYTHROCYTE [DISTWIDTH] IN BLOOD BY AUTOMATED COUNT: 15.7 % (ref 11–14.5)
HCT VFR BLD AUTO: 27.2 % (ref 40–54)
HGB BLD-MCNC: 8.8 G/DL (ref 14–18)
LYMPHOCYTES # BLD AUTO: 1.8 THOU/UL (ref 0.8–4.4)
LYMPHOCYTES NFR BLD AUTO: 33 % (ref 20–40)
MCH RBC QN AUTO: 28.3 PG (ref 27–34)
MCHC RBC AUTO-ENTMCNC: 32.4 G/DL (ref 32–36)
MCV RBC AUTO: 87 FL (ref 80–100)
MONOCYTES # BLD AUTO: 0.4 THOU/UL (ref 0–0.9)
MONOCYTES NFR BLD AUTO: 7 % (ref 2–10)
NEUTROPHILS # BLD AUTO: 3.1 THOU/UL (ref 2–7.7)
NEUTROPHILS NFR BLD AUTO: 57 % (ref 50–70)
PLATELET # BLD AUTO: 236 THOU/UL (ref 140–440)
PMV BLD AUTO: 8.8 FL (ref 7–10)
RBC # BLD AUTO: 3.11 MILL/UL (ref 4.4–6.2)
WBC: 5.4 THOU/UL (ref 4–11)

## 2019-08-13 NOTE — PROGRESS NOTES
Diagnosis/Summary/Recommendations:    PATIENT: Avi Villafana  79 year old male     : 1940    BARTOLOME: 2019    Wondering about benfotiamine 300mg  Benfotiamine is a synthetic S-acyl derivative of thiamine     Port placed  Has been diagnosed with esophageal cancer  2019 will get weekly chemo for 5 weeks. 5 days of radiation for 5 weeks  They are encouraging. 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 No evidence of distant metastases.   Other Result Information   This result has an attachment that is not available.   Result Narrative   EXAM: NM PET CT SKULL TO MID THIGH  LOCATION: [facility]  DATE/TIME: 2019 11:30 AM    INDICATION: Initial treatment strategy for staging malignant neoplasm of lower third of esophagus  COMPARISON: CT 2019  TECHNIQUE: Serum glucose level 98 mg/dL. One hour post intravenous administration of 10.9 mCi F-18 FDG, PET imaging was performed from the skull base to the mid thighs utilizing attenuation correction with concurrent axial CT and PET/CT image fusion.   Dose reduction techniques were used.    FINDINGS: Extremely FDG avid (SUVmax 26.0) annular wall thickening in the distal esophagus extends for about 4 cm to the gastroesophageal junction. FDG avid paraesophageal and gastrohepatic ligament lymphadenopathy. The gastrohepatic ligament node, as an   example, measures 1.2 x 0.7 cm (SUVmax 3.2). FDG activity in the remainder of the body is normal.    Moderate senescent intracranial changes. Moderate calcified atherosclerosis, including coronary disease and/or  stents. Hypoattenuation of blood pool relative to myocardium, suggesting anemia. Tiny nonobstructing bilateral kidney stones. Bilateral kidney   cysts. Liver cysts. Mildly enlarged prostate. Pelvic phleboliths. Moderate hypertrophic change throughout the spine.   Other Result Information   Interface, Rad Results In - 08/21/2019  2:26 PM CDT  EXAM: NM PET CT SKULL TO MID THIGH  LOCATION: [facility]  DATE/TIME: 8/21/2019 11:30 AM    INDICATION: Initial treatment strategy for staging malignant neoplasm of lower third of esophagus  COMPARISON: CT 08/07/2019  TECHNIQUE: Serum glucose level 98 mg/dL. One hour post intravenous administration of 10.9 mCi F-18 FDG, PET imaging was performed from the skull base to the mid thighs utilizing attenuation correction with concurrent axial CT and PET/CT image fusion.   Dose reduction techniques were used.    FINDINGS: Extremely FDG avid (SUVmax 26.0) annular wall thickening in the distal esophagus extends for about 4 cm to the gastroesophageal junction. FDG avid paraesophageal and gastrohepatic ligament lymphadenopathy. The gastrohepatic ligament node, as an   example, measures 1.2 x 0.7 cm (SUVmax 3.2). FDG activity in the remainder of the body is normal.    Moderate senescent intracranial changes. Moderate calcified atherosclerosis, including coronary disease and/or stents. Hypoattenuation of blood pool relative to myocardium, suggesting anemia. Tiny nonobstructing bilateral kidney stones. Bilateral kidney   cysts. Liver cysts. Mildly enlarged prostate. Pelvic phleboliths. Moderate hypertrophic change throughout the spine.    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 No evidence of distant metastases.        Medications     7/730am noon 5-7pm                     Aspirin 81mg stopped           Carbidopa/levodopa sinemet 25/250 1 1 1        Escitalopram lexapro 5mg 1.5           Finasteride Proscar 5mg 1           Fish oil omega 3 fatty acids 1000mg stopped       Hydrocodone-acetaminophen 5-325 stopped       Lidocaine-prilocaine EMLA 2.5-2.5% external cream   stopped       Metoprolol succinate TOPROL XL 25mg  24 hr tablet stopped           non formulary calm sleep powder       RARE      Omeprazole prilosec 20mg         Potassium citrate Urocit-K 10meq 1080 mg cr tablet 2   2       Prochlorperazine compazine 5mg  ?       Ramipril altace 5 mg  Not taking           Rosuvastatin crestor 20mg     1       Tamsulosin flomax 0.4mg capsule 1                                                                                               History obtained from patient    PLAN  Continue the sinemet for now using the 25/250 tablet but if having nausea and unable to keep it down could use parcopa  Instead.    Would avoid prochlorperazine due to its dopamine blocking effects and  Use a medication like zofran instead if there is nausea.     Return back in 3-6 months    No supplements for now     No cognitive testing for now    He will be undergoing chemotherapy, radiation and then surgery for his esophageal cancer.       Coding statement:   Duration of  Services: patient care and care coordination was 25 minutes  Greater than 50% of this visit was spent in counseling and coordination of care.     Gomez Bingham MD     ______________________________________    Last visit date and details:      BARTOLOME: February 28, 2019     HAS SOME ANXIETY  Sitting around and thinking about things.   Dr Stanley started the escitalopram  He had been on 2 in the past and is now on 1.5 tabs  He dos not like to talk on the phone.      He had problems with breathing.      He had counseling and did some hypnosis.      He had a nasal fracture from football. He had problems breathing.      Joel     HE HAS HAD SOME FORGETFULNESS.     Doing boxing ([employer] boxing), acupuncture, word scape,  sodoku, chiropractic and massage.   And other activities     Did the Parkinson walk.      Was in [place] 2 weeks and walked on the beach  Rained the time there.   Had a great time.      There has been cold temperatures.            Medications     7/730am noon 5-7pm                     Aspirin 81mg 1           Carbidopa/levodopa sinemet 25/250 1 1 1       Escitalopram lexapro 5mg 1.5           Finasteride Proscar 5mg 1           Metoprolol succinate TOPROL XL 25mg  24 hr tablet 1/2           non formulary calm sleep powder       RARE      Potassium citrate 2   2       Ramipril altace 5 mg  1           Rosuvastatin crestor 20mg     1       Tamsulosin flomax 0.4mg capsule 1                                                                                                                                                          history obtained from patient      Has nocturia 3/noc  Will be seeing urology  Discussed reducing night time water - less to n one after 6pm  He has not been on mirabegron     He has RBD episodes which are rare. He does not know when he is going to have them. He has not tried melatonin.   Discussed 3 or 5mg tab of melatonin one hour before bedtime.      No change in medications at this point.     Louisa Kasey has access to his records per his consent.      Return back.           ______________________________________      Patient was asked about 14 Review of systems including changes in vision (dry eyes, double vision), hearing, heart, lungs, musculoskeletal, depression, anxiety, snoring, RBD, insomnia, urinary frequency, urinary urgency, constipation, swallowing problems, hematological, ID, allergies, skin problems: seborrhea, endocrinological: thyroid, diabetes, cholesterol; balance, weight changes, and other neurological problems and these were not significant at this time except for   Patient Active Problem List   Diagnosis     Anxiety     Family history of Parkinson disease     Essential  hypertension     Hyperlipidemia     Tremor     Paralysis agitans (H)     ACP (advance care planning)     ASHD (arteriosclerotic heart disease)     Backache     Chest pain     Chronic rhinitis     Deviated septum     Lateral epicondylitis of elbow     GERD (gastroesophageal reflux disease)     Inguinal hernia recurrent unilateral     Leg cramps     Lipid disorder     Orthostatic hypotension     Low back pain     Other diseases of respiratory system, not elsewhere classified     Sacroiliitis (H)     Spasm of back muscles     Spinal headache     Syncope     Parkinson disease (H)          Allergies   Allergen Reactions     Latex      RASH       Past Surgical History:   Procedure Laterality Date     cervical spine surgery  1990    c5/6 neck      HERNIA REPAIR Bilateral      LITHOTRIPSY      renal stones     NOSE SURGERY      dr nieto. ?2015     Past Medical History:   Diagnosis Date     Anxiety 8/20/2018     Family history of Parkinson disease 8/20/2018     Hypercholesterolemia 8/20/2018     Hypertension 8/20/2018     Paralysis agitans (H) 8/20/2018     Tremor 8/20/2018     Social History     Socioeconomic History     Marital status:      Spouse name: Not on file     Number of children: Not on file     Years of education: Not on file     Highest education level: Not on file   Occupational History     Not on file   Social Needs     Financial resource strain: Not on file     Food insecurity:     Worry: Not on file     Inability: Not on file     Transportation needs:     Medical: Not on file     Non-medical: Not on file   Tobacco Use     Smoking status: Former Smoker     Smokeless tobacco: Never Used     Tobacco comment: 1983   Substance and Sexual Activity     Alcohol use: Yes     Comment: 2-3 PER WEEK     Drug use: No     Sexual activity: Not on file   Lifestyle     Physical activity:     Days per week: Not on file     Minutes per session: Not on file     Stress: Not on file   Relationships     Social  connections:     Talks on phone: Not on file     Gets together: Not on file     Attends Mandaeism service: Not on file     Active member of club or organization: Not on file     Attends meetings of clubs or organizations: Not on file     Relationship status: Not on file     Intimate partner violence:     Fear of current or ex partner: Not on file     Emotionally abused: Not on file     Physically abused: Not on file     Forced sexual activity: Not on file   Other Topics Concern     Not on file   Social History Narrative    . LIVES IN [place]. 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  Medications and Vitals not listed above were documented in the cart and reviewed by me.     Current Outpatient Medications   Medication Sig Dispense Refill     aspirin 81 MG tablet 81mg tab by mouth daily @ 7/730am 90 tablet 3     carbidopa-levodopa (SINEMET)  MG per tablet 25/250 TAB BY MOUTH 3/DAY @ 7am, noon and 5-7pm 270 tablet 3     escitalopram (LEXAPRO) 5 MG tablet 1.5 x 5mg tabs by mouth daily @ 7am  (7.5mg dose/day) 135 tablet 3     finasteride (PROSCAR) 5 MG tablet 5mg tab by mouth daily @ 7am 90 tablet 3     fish oil-omega-3 fatty acids 1000 MG capsule Take 1,000 mg by mouth       metoprolol succinate ER (TOPROL-XL) 25 MG 24 hr tablet 1/2 25mg tab by mouth daily @ 7am 90 tablet 3     NONFORMULARY Calm powder at night as needed for sleep       omeprazole (PRILOSEC) 20 MG DR capsule Take 20 mg by mouth       potassium citrate (UROCIT-K) 10 MEQ (1080 MG) CR tablet 2 tabs by mouth twice daily @ 7am and 5-7pm  =  4/day 360 tablet 3     ramipril (ALTACE) 5 MG capsule 5mg capsule by mouth daily @ 7am 90 capsule 3     rosuvastatin (CRESTOR) 20 MG tablet 20mg tab by mouth nightly @ 5-7pm       tamsulosin (FLOMAX) 0.4 MG capsule 0.4mg capsule by mouth daily @ 7/730am 30 capsule          Gomez Bingham MD

## 2019-08-15 ASSESSMENT — MIFFLIN-ST. JEOR: SCORE: 1667

## 2019-08-16 ENCOUNTER — COMMUNICATION - HEALTHEAST (OUTPATIENT)
Dept: ONCOLOGY | Facility: HOSPITAL | Age: 79
End: 2019-08-16

## 2019-08-16 ENCOUNTER — SURGERY - HEALTHEAST (OUTPATIENT)
Dept: SURGERY | Facility: HOSPITAL | Age: 79
End: 2019-08-16

## 2019-08-16 ENCOUNTER — OFFICE VISIT - HEALTHEAST (OUTPATIENT)
Dept: ONCOLOGY | Facility: HOSPITAL | Age: 79
End: 2019-08-16

## 2019-08-16 ENCOUNTER — ANESTHESIA - HEALTHEAST (OUTPATIENT)
Dept: SURGERY | Facility: HOSPITAL | Age: 79
End: 2019-08-16

## 2019-08-16 DIAGNOSIS — Z51.11 ENCOUNTER FOR ANTINEOPLASTIC CHEMOTHERAPY: ICD-10-CM

## 2019-08-16 DIAGNOSIS — T50.905A ADVERSE EFFECT OF DRUG, INITIAL ENCOUNTER: ICD-10-CM

## 2019-08-16 DIAGNOSIS — C15.9 ADENOCARCINOMA OF ESOPHAGUS (H): ICD-10-CM

## 2019-08-16 DIAGNOSIS — D50.0 IRON DEFICIENCY ANEMIA DUE TO CHRONIC BLOOD LOSS: ICD-10-CM

## 2019-08-16 DIAGNOSIS — C15.5 MALIGNANT NEOPLASM OF LOWER THIRD OF ESOPHAGUS (H): ICD-10-CM

## 2019-08-16 ASSESSMENT — MIFFLIN-ST. JEOR: SCORE: 1694.22

## 2019-08-17 ENCOUNTER — RECORDS - HEALTHEAST (OUTPATIENT)
Dept: ADMINISTRATIVE | Facility: OTHER | Age: 79
End: 2019-08-17

## 2019-08-21 ENCOUNTER — HOSPITAL ENCOUNTER (OUTPATIENT)
Dept: PET IMAGING | Facility: HOSPITAL | Age: 79
Discharge: HOME | End: 2019-08-21
Attending: INTERNAL MEDICINE

## 2019-08-21 DIAGNOSIS — C15.5 MALIGNANT NEOPLASM OF LOWER THIRD OF ESOPHAGUS (H): ICD-10-CM

## 2019-08-21 DIAGNOSIS — C15.9 ADENOCARCINOMA OF ESOPHAGUS (H): ICD-10-CM

## 2019-08-21 LAB — GLUCOSE BLDC GLUCOMTR-MCNC: 95 MG/DL (ref 70–139)

## 2019-08-22 ENCOUNTER — AMBULATORY - HEALTHEAST (OUTPATIENT)
Dept: INFUSION THERAPY | Facility: HOSPITAL | Age: 79
End: 2019-08-22

## 2019-08-22 ENCOUNTER — COMMUNICATION - HEALTHEAST (OUTPATIENT)
Dept: ONCOLOGY | Facility: HOSPITAL | Age: 79
End: 2019-08-22

## 2019-08-23 ENCOUNTER — AMBULATORY - HEALTHEAST (OUTPATIENT)
Dept: RADIATION ONCOLOGY | Facility: HOSPITAL | Age: 79
End: 2019-08-23

## 2019-08-23 ENCOUNTER — COMMUNICATION - HEALTHEAST (OUTPATIENT)
Dept: ONCOLOGY | Facility: HOSPITAL | Age: 79
End: 2019-08-23

## 2019-08-23 ENCOUNTER — OFFICE VISIT - HEALTHEAST (OUTPATIENT)
Dept: RADIATION ONCOLOGY | Facility: HOSPITAL | Age: 79
End: 2019-08-23

## 2019-08-23 DIAGNOSIS — C15.9 ADENOCARCINOMA OF ESOPHAGUS (H): ICD-10-CM

## 2019-08-26 ENCOUNTER — ANESTHESIA - HEALTHEAST (OUTPATIENT)
Dept: SURGERY | Facility: CLINIC | Age: 79
End: 2019-08-26

## 2019-08-26 ENCOUNTER — SURGERY - HEALTHEAST (OUTPATIENT)
Dept: SURGERY | Facility: CLINIC | Age: 79
End: 2019-08-26

## 2019-08-26 ASSESSMENT — MIFFLIN-ST. JEOR: SCORE: 1640.69

## 2019-08-27 ENCOUNTER — COMMUNICATION - HEALTHEAST (OUTPATIENT)
Dept: ONCOLOGY | Facility: CLINIC | Age: 79
End: 2019-08-27

## 2019-08-29 ENCOUNTER — RECORDS - HEALTHEAST (OUTPATIENT)
Dept: ADMINISTRATIVE | Facility: OTHER | Age: 79
End: 2019-08-29

## 2019-08-29 ENCOUNTER — OFFICE VISIT (OUTPATIENT)
Dept: NEUROLOGY | Facility: CLINIC | Age: 79
End: 2019-08-29
Payer: MEDICARE

## 2019-08-29 VITALS
RESPIRATION RATE: 16 BRPM | HEART RATE: 66 BPM | SYSTOLIC BLOOD PRESSURE: 92 MMHG | WEIGHT: 198 LBS | BODY MASS INDEX: 26.24 KG/M2 | DIASTOLIC BLOOD PRESSURE: 54 MMHG | OXYGEN SATURATION: 98 % | TEMPERATURE: 97.6 F | HEIGHT: 73 IN

## 2019-08-29 DIAGNOSIS — G20.C PARKINSONISM, UNSPECIFIED PARKINSONISM TYPE (H): Primary | ICD-10-CM

## 2019-08-29 PROBLEM — K92.1 MELENA: Status: ACTIVE | Noted: 2019-08-06

## 2019-08-29 PROBLEM — R42 ORTHOSTATIC DIZZINESS: Status: ACTIVE | Noted: 2019-08-06

## 2019-08-29 PROBLEM — T50.905A ADVERSE DRUG EFFECT: Status: ACTIVE | Noted: 2019-08-16

## 2019-08-29 PROBLEM — D50.0 IRON DEFICIENCY ANEMIA DUE TO CHRONIC BLOOD LOSS: Status: ACTIVE | Noted: 2019-08-16

## 2019-08-29 PROBLEM — D64.9 PROFOUND ANEMIA: Status: ACTIVE | Noted: 2019-08-06

## 2019-08-29 PROBLEM — C15.9 ADENOCARCINOMA OF ESOPHAGUS (H): Status: ACTIVE | Noted: 2019-08-09

## 2019-08-29 PROBLEM — N40.0 BPH (BENIGN PROSTATIC HYPERPLASIA): Status: ACTIVE | Noted: 2019-08-06

## 2019-08-29 PROBLEM — Z51.11 ENCOUNTER FOR ANTINEOPLASTIC CHEMOTHERAPY: Status: ACTIVE | Noted: 2019-08-16

## 2019-08-29 RX ORDER — PROCHLORPERAZINE MALEATE 5 MG
TABLET ORAL
COMMUNITY
Start: 2019-08-19 | End: 2020-01-01

## 2019-08-29 RX ORDER — CARBIDOPA AND LEVODOPA 25; 250 MG/1; MG/1
1 TABLET, ORALLY DISINTEGRATING ORAL 3 TIMES DAILY
Qty: 270 TABLET | Refills: 3 | Status: SHIPPED | OUTPATIENT
Start: 2019-08-29 | End: 2020-01-01

## 2019-08-29 RX ORDER — CARBIDOPA/LEVODOPA 25MG-250MG
TABLET ORAL
Qty: 270 TABLET | Refills: 3 | Status: SHIPPED | OUTPATIENT
Start: 2019-08-29 | End: 2020-01-01

## 2019-08-29 RX ORDER — OMEPRAZOLE 20 MG/1
20 TABLET, DELAYED RELEASE ORAL
COMMUNITY
Start: 2019-08-07 | End: 2019-08-29

## 2019-08-29 RX ORDER — LIDOCAINE/PRILOCAINE 2.5 %-2.5%
1 CREAM (GRAM) TOPICAL
COMMUNITY
End: 2019-08-29

## 2019-08-29 RX ORDER — HYDROCODONE BITARTRATE AND ACETAMINOPHEN 5; 325 MG/1; MG/1
1 TABLET ORAL
COMMUNITY
Start: 2019-08-26 | End: 2019-08-29

## 2019-08-29 RX ORDER — PROCHLORPERAZINE MALEATE 5 MG
TABLET ORAL
Refills: 1 | COMMUNITY
Start: 2019-08-19 | End: 2019-08-29

## 2019-08-29 ASSESSMENT — PAIN SCALES - GENERAL: PAINLEVEL: NO PAIN (0)

## 2019-08-29 ASSESSMENT — MIFFLIN-ST. JEOR: SCORE: 1677

## 2019-08-29 NOTE — PATIENT INSTRUCTIONS
PLAN  Continue the sinemet for now using the 25/250 tablet but if having nausea and unable to keep it down could use parcopa  Instead.    Would avoid prochlorperazine due to its dopamine blocking effects and  Use a medication like zofran instead if there is nausea.     Return back in 3-6 months    No supplements for now     No cognitive testing for now    He will be undergoing chemotherapy, radiation and then surgery for his esophageal cancer.

## 2019-08-29 NOTE — NURSING NOTE
Chief Complaint   Patient presents with     Parkinson     [facility] 6MO RETURN     Medication Refill     SINEMENT       Mali Madrigal, CMA

## 2019-08-29 NOTE — LETTER
2019       RE: Avi Villafana  [address] [facility] [address]     Dear Colleague,    Thank you for referring your patient, Avi Villafana, to the [facility] NEUROLOGY at [facility]. Please see a copy of my visit note below.    Diagnosis/Summary/Recommendations:    PATIENT: Avi Villafana  79 year old male     : 1940    BARTOLOME: 2019    Wondering about benfotiamine 300mg  Benfotiamine is a synthetic S-acyl derivative of thiamine     Port placed  Has been diagnosed with esophageal cancer  2019 will get weekly chemo for 5 weeks. 5 days of radiation for 5 weeks  They are encouraging. 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 No evidence of distant metastases.   Other Result Information   This result has an attachment that is not available.   Result Narrative   EXAM: NM PET CT SKULL TO MID THIGH  LOCATION: [facility]  DATE/TIME: 2019 11:30 AM    INDICATION: Initial treatment strategy for staging malignant neoplasm of lower third of esophagus  COMPARISON: CT 2019  TECHNIQUE: Serum glucose level 98 mg/dL. One hour post intravenous administration of 10.9 mCi F-18 FDG, PET imaging was performed from the skull base to the mid thighs utilizing attenuation correction with concurrent axial CT and PET/CT image fusion.   Dose reduction techniques were used.    FINDINGS: Extremely FDG avid (SUVmax 26.0) annular wall thickening in the distal esophagus extends for about 4 cm to the gastroesophageal junction. FDG avid paraesophageal and gastrohepatic  ligament lymphadenopathy. The gastrohepatic ligament node, as an   example, measures 1.2 x 0.7 cm (SUVmax 3.2). FDG activity in the remainder of the body is normal.    Moderate senescent intracranial changes. Moderate calcified atherosclerosis, including coronary disease and/or stents. Hypoattenuation of blood pool relative to myocardium, suggesting anemia. Tiny nonobstructing bilateral kidney stones. Bilateral kidney   cysts. Liver cysts. Mildly enlarged prostate. Pelvic phleboliths. Moderate hypertrophic change throughout the spine.   Other Result Information   Interface, Rad Results In - 08/21/2019  2:26 PM CDT  EXAM: NM PET CT SKULL TO MID THIGH  LOCATION: [facility]  DATE/TIME: 8/21/2019 11:30 AM    INDICATION: Initial treatment strategy for staging malignant neoplasm of lower third of esophagus  COMPARISON: CT 08/07/2019  TECHNIQUE: Serum glucose level 98 mg/dL. One hour post intravenous administration of 10.9 mCi F-18 FDG, PET imaging was performed from the skull base to the mid thighs utilizing attenuation correction with concurrent axial CT and PET/CT image fusion.   Dose reduction techniques were used.    FINDINGS: Extremely FDG avid (SUVmax 26.0) annular wall thickening in the distal esophagus extends for about 4 cm to the gastroesophageal junction. FDG avid paraesophageal and gastrohepatic ligament lymphadenopathy. The gastrohepatic ligament node, as an   example, measures 1.2 x 0.7 cm (SUVmax 3.2). FDG activity in the remainder of the body is normal.    Moderate senescent intracranial changes. Moderate calcified atherosclerosis, including coronary disease and/or stents. Hypoattenuation of blood pool relative to myocardium, suggesting anemia. Tiny nonobstructing bilateral kidney stones. Bilateral kidney   cysts. Liver cysts. Mildly enlarged prostate. Pelvic phleboliths. Moderate hypertrophic change throughout the spine.    IMPRESSION:   CONCLUSION:  Findings consistent with distal esophageal  carcinoma with paraesophageal and gastrohepatic ligament lymph node metastases above the level of the celiac axis. No evidence of distant metastases.        Medications     7/730am noon 5-7pm                     Aspirin 81mg stopped           Carbidopa/levodopa sinemet 25/250 1 1 1       Escitalopram lexapro 5mg 1.5           Finasteride Proscar 5mg 1           Fish oil omega 3 fatty acids 1000mg stopped       Hydrocodone-acetaminophen 5-325 stopped       Lidocaine-prilocaine EMLA 2.5-2.5% external cream   stopped       Metoprolol succinate TOPROL XL 25mg  24 hr tablet stopped           non formulary calm sleep powder       RARE      Omeprazole prilosec 20mg         Potassium citrate Urocit-K 10meq 1080 mg cr tablet 2   2       Prochlorperazine compazine 5mg  ?       Ramipril altace 5 mg  Not taking           Rosuvastatin crestor 20mg     1       Tamsulosin flomax 0.4mg capsule 1                                                                                               History obtained from patient    PLAN  Continue the sinemet for now using the 25/250 tablet but if having nausea and unable to keep it down could use parcopa  Instead.    Would avoid prochlorperazine due to its dopamine blocking effects and  Use a medication like zofran instead if there is nausea.     Return back in 3-6 months    No supplements for now     No cognitive testing for now    He will be undergoing chemotherapy, radiation and then surgery for his esophageal cancer.       Coding statement:   Duration of  Services: patient care and care coordination was 25 minutes  Greater than 50% of this visit was spent in counseling and coordination of care.     Gomez Bingham MD     ______________________________________    Last visit date and details:      BARTOLOME: February 28, 2019     HAS SOME ANXIETY  Sitting around and thinking about things.   Dr Stanley started the escitalopram  He had been on 2 in the past and is now on 1.5 tabs  He dos not like to talk  on the phone.      He had problems with breathing.      He had counseling and did some hypnosis.      He had a nasal fracture from football. He had problems breathing.      Joel     HE HAS HAD SOME FORGETFULNESS.     Doing boxing ([employer] boxing), acupuncture, word scape, sodoku, chiropractic and massage.   And other activities     Did the Parkinson walk.      Was in [place] 2 weeks and walked on the beach  Rained the time there.   Had a great time.      There has been cold temperatures.            Medications     7/730am noon 5-7pm                     Aspirin 81mg 1           Carbidopa/levodopa sinemet 25/250 1 1 1       Escitalopram lexapro 5mg 1.5           Finasteride Proscar 5mg 1           Metoprolol succinate TOPROL XL 25mg  24 hr tablet 1/2           non formulary calm sleep powder       RARE      Potassium citrate 2   2       Ramipril altace 5 mg  1           Rosuvastatin crestor 20mg     1       Tamsulosin flomax 0.4mg capsule 1                                                                                                                                                          history obtained from patient      Has nocturia 3/noc  Will be seeing urology  Discussed reducing night time water - less to n one after 6pm  He has not been on mirabegron     He has RBD episodes which are rare. He does not know when he is going to have them. He has not tried melatonin.   Discussed 3 or 5mg tab of melatonin one hour before bedtime.      No change in medications at this point.     Louisa Parks has access to his records per his consent.      Return back.           ______________________________________      Patient was asked about 14 Review of systems including changes in vision (dry eyes, double vision), hearing, heart, lungs, musculoskeletal, depression, anxiety, snoring, RBD, insomnia, urinary frequency, urinary urgency, constipation, swallowing problems, hematological, ID, allergies, skin problems:  seborrhea, endocrinological: thyroid, diabetes, cholesterol; balance, weight changes, and other neurological problems and these were not significant at this time except for   Patient Active Problem List   Diagnosis     Anxiety     Family history of Parkinson disease     Essential hypertension     Hyperlipidemia     Tremor     Paralysis agitans (H)     ACP (advance care planning)     ASHD (arteriosclerotic heart disease)     Backache     Chest pain     Chronic rhinitis     Deviated septum     Lateral epicondylitis of elbow     GERD (gastroesophageal reflux disease)     Inguinal hernia recurrent unilateral     Leg cramps     Lipid disorder     Orthostatic hypotension     Low back pain     Other diseases of respiratory system, not elsewhere classified     Sacroiliitis (H)     Spasm of back muscles     Spinal headache     Syncope     Parkinson disease (H)          Allergies   Allergen Reactions     Latex      RASH       Past Surgical History:   Procedure Laterality Date     cervical spine surgery  1990    c5/6 neck      HERNIA REPAIR Bilateral      LITHOTRIPSY      renal stones     NOSE SURGERY      dr nieto. ?2015     Past Medical History:   Diagnosis Date     Anxiety 8/20/2018     Family history of Parkinson disease 8/20/2018     Hypercholesterolemia 8/20/2018     Hypertension 8/20/2018     Paralysis agitans (H) 8/20/2018     Tremor 8/20/2018     Social History     Socioeconomic History     Marital status:      Spouse name: Not on file     Number of children: Not on file     Years of education: Not on file     Highest education level: Not on file   Occupational History     Not on file   Social Needs     Financial resource strain: Not on file     Food insecurity:     Worry: Not on file     Inability: Not on file     Transportation needs:     Medical: Not on file     Non-medical: Not on file   Tobacco Use     Smoking status: Former Smoker     Smokeless tobacco: Never Used     Tobacco comment: 1983   Substance  and Sexual Activity     Alcohol use: Yes     Comment: 2-3 PER WEEK     Drug use: No     Sexual activity: Not on file   Lifestyle     Physical activity:     Days per week: Not on file     Minutes per session: Not on file     Stress: Not on file   Relationships     Social connections:     Talks on phone: Not on file     Gets together: Not on file     Attends Shinto service: Not on file     Active member of club or organization: Not on file     Attends meetings of clubs or organizations: Not on file     Relationship status: Not on file     Intimate partner violence:     Fear of current or ex partner: Not on file     Emotionally abused: Not on file     Physically abused: Not on file     Forced sexual activity: Not on file   Other Topics Concern     Not on file   Social History Narrative    . LIVES IN [place]. 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  Medications and Vitals not listed above were documented in the cart and reviewed by me.     Current Outpatient Medications   Medication Sig Dispense Refill     aspirin 81 MG tablet 81mg tab by mouth daily @ 7/730am 90 tablet 3     carbidopa-levodopa (SINEMET)  MG per tablet 25/250 TAB BY MOUTH 3/DAY @ 7am, noon and 5-7pm 270 tablet 3     escitalopram (LEXAPRO) 5 MG tablet 1.5 x 5mg tabs by mouth daily @ 7am  (7.5mg dose/day) 135 tablet 3     finasteride (PROSCAR) 5 MG tablet 5mg tab by mouth daily @ 7am 90 tablet 3     fish oil-omega-3 fatty acids 1000 MG capsule Take 1,000 mg by mouth       metoprolol succinate ER (TOPROL-XL) 25 MG 24 hr tablet 1/2 25mg  tab by mouth daily @ 7am 90 tablet 3     NONFORMULARY Calm powder at night as needed for sleep       omeprazole (PRILOSEC) 20 MG DR capsule Take 20 mg by mouth       potassium citrate (UROCIT-K) 10 MEQ (1080 MG) CR tablet 2 tabs by mouth twice daily @ 7am and 5-7pm  = 4/day 360 tablet 3     ramipril (ALTACE) 5 MG capsule 5mg capsule by mouth daily @ 7am 90 capsule 3     rosuvastatin (CRESTOR) 20 MG tablet 20mg tab by mouth nightly @ 5-7pm       tamsulosin (FLOMAX) 0.4 MG capsule 0.4mg capsule by mouth daily @ 7/730am 30 capsule          Gomez Bingham MD

## 2019-08-30 ENCOUNTER — COMMUNICATION - HEALTHEAST (OUTPATIENT)
Dept: ONCOLOGY | Facility: CLINIC | Age: 79
End: 2019-08-30

## 2019-08-30 DIAGNOSIS — Z51.11 ENCOUNTER FOR ANTINEOPLASTIC CHEMOTHERAPY: ICD-10-CM

## 2019-09-05 ENCOUNTER — AMBULATORY - HEALTHEAST (OUTPATIENT)
Dept: INFUSION THERAPY | Facility: CLINIC | Age: 79
End: 2019-09-05

## 2019-09-05 ENCOUNTER — OFFICE VISIT - HEALTHEAST (OUTPATIENT)
Dept: ONCOLOGY | Facility: CLINIC | Age: 79
End: 2019-09-05

## 2019-09-05 ENCOUNTER — INFUSION - HEALTHEAST (OUTPATIENT)
Dept: INFUSION THERAPY | Facility: CLINIC | Age: 79
End: 2019-09-05

## 2019-09-05 ENCOUNTER — OFFICE VISIT - HEALTHEAST (OUTPATIENT)
Dept: RADIATION ONCOLOGY | Facility: CLINIC | Age: 79
End: 2019-09-05

## 2019-09-05 DIAGNOSIS — T50.905A ADVERSE EFFECT OF DRUG, INITIAL ENCOUNTER: ICD-10-CM

## 2019-09-05 DIAGNOSIS — C15.9 ADENOCARCINOMA OF ESOPHAGUS (H): ICD-10-CM

## 2019-09-05 DIAGNOSIS — D50.0 IRON DEFICIENCY ANEMIA DUE TO CHRONIC BLOOD LOSS: ICD-10-CM

## 2019-09-05 DIAGNOSIS — Z51.11 ENCOUNTER FOR ANTINEOPLASTIC CHEMOTHERAPY: ICD-10-CM

## 2019-09-05 DIAGNOSIS — T50.995A ADVERSE EFFECT OF OTHER DRUGS, MEDICAMENTS AND BIOLOGICAL SUBSTANCES, INITIAL ENCOUNTER: ICD-10-CM

## 2019-09-05 LAB
ALBUMIN SERPL-MCNC: 3.7 G/DL (ref 3.5–5)
ALP SERPL-CCNC: 49 U/L (ref 45–120)
ALT SERPL W P-5'-P-CCNC: <9 U/L (ref 0–45)
ANION GAP SERPL CALCULATED.3IONS-SCNC: 7 MMOL/L (ref 5–18)
AST SERPL W P-5'-P-CCNC: 14 U/L (ref 0–40)
BASOPHILS # BLD AUTO: 0 THOU/UL (ref 0–0.2)
BASOPHILS NFR BLD AUTO: 0 % (ref 0–2)
BILIRUB SERPL-MCNC: 0.7 MG/DL (ref 0–1)
BUN SERPL-MCNC: 18 MG/DL (ref 8–28)
CALCIUM SERPL-MCNC: 8.8 MG/DL (ref 8.5–10.5)
CHLORIDE BLD-SCNC: 108 MMOL/L (ref 98–107)
CO2 SERPL-SCNC: 24 MMOL/L (ref 22–31)
CREAT SERPL-MCNC: 1.22 MG/DL (ref 0.7–1.3)
EOSINOPHIL # BLD AUTO: 0.1 THOU/UL (ref 0–0.4)
EOSINOPHIL NFR BLD AUTO: 1 % (ref 0–6)
ERYTHROCYTE [DISTWIDTH] IN BLOOD BY AUTOMATED COUNT: 17.5 % (ref 11–14.5)
GFR SERPL CREATININE-BSD FRML MDRD: 58 ML/MIN/1.73M2
GLUCOSE BLD-MCNC: 113 MG/DL (ref 70–125)
HCT VFR BLD AUTO: 29.6 % (ref 40–54)
HGB BLD-MCNC: 8.9 G/DL (ref 14–18)
LYMPHOCYTES # BLD AUTO: 1.3 THOU/UL (ref 0.8–4.4)
LYMPHOCYTES NFR BLD AUTO: 23 % (ref 20–40)
MCH RBC QN AUTO: 25.5 PG (ref 27–34)
MCHC RBC AUTO-ENTMCNC: 30.1 G/DL (ref 32–36)
MCV RBC AUTO: 85 FL (ref 80–100)
MONOCYTES # BLD AUTO: 0.5 THOU/UL (ref 0–0.9)
MONOCYTES NFR BLD AUTO: 8 % (ref 2–10)
NEUTROPHILS # BLD AUTO: 3.7 THOU/UL (ref 2–7.7)
NEUTROPHILS NFR BLD AUTO: 66 % (ref 50–70)
PLATELET # BLD AUTO: 203 THOU/UL (ref 140–440)
PMV BLD AUTO: 10.6 FL (ref 8.5–12.5)
POTASSIUM BLD-SCNC: 4.2 MMOL/L (ref 3.5–5)
PROT SERPL-MCNC: 6.2 G/DL (ref 6–8)
RBC # BLD AUTO: 3.49 MILL/UL (ref 4.4–6.2)
SODIUM SERPL-SCNC: 139 MMOL/L (ref 136–145)
WBC: 5.5 THOU/UL (ref 4–11)

## 2019-09-10 ENCOUNTER — COMMUNICATION - HEALTHEAST (OUTPATIENT)
Dept: ONCOLOGY | Facility: CLINIC | Age: 79
End: 2019-09-10

## 2019-09-10 ENCOUNTER — OFFICE VISIT - HEALTHEAST (OUTPATIENT)
Dept: ONCOLOGY | Facility: CLINIC | Age: 79
End: 2019-09-10

## 2019-09-10 DIAGNOSIS — C15.9 ADENOCARCINOMA OF ESOPHAGUS (H): ICD-10-CM

## 2019-09-10 DIAGNOSIS — Z51.11 ENCOUNTER FOR ANTINEOPLASTIC CHEMOTHERAPY: ICD-10-CM

## 2019-09-12 ENCOUNTER — INFUSION - HEALTHEAST (OUTPATIENT)
Dept: INFUSION THERAPY | Facility: CLINIC | Age: 79
End: 2019-09-12

## 2019-09-12 ENCOUNTER — OFFICE VISIT - HEALTHEAST (OUTPATIENT)
Dept: ONCOLOGY | Facility: CLINIC | Age: 79
End: 2019-09-12

## 2019-09-12 ENCOUNTER — AMBULATORY - HEALTHEAST (OUTPATIENT)
Dept: INFUSION THERAPY | Facility: CLINIC | Age: 79
End: 2019-09-12

## 2019-09-12 ENCOUNTER — OFFICE VISIT - HEALTHEAST (OUTPATIENT)
Dept: RADIATION ONCOLOGY | Facility: CLINIC | Age: 79
End: 2019-09-12

## 2019-09-12 DIAGNOSIS — T50.905A ADVERSE EFFECT OF DRUG, INITIAL ENCOUNTER: ICD-10-CM

## 2019-09-12 DIAGNOSIS — Z51.11 ENCOUNTER FOR ANTINEOPLASTIC CHEMOTHERAPY: ICD-10-CM

## 2019-09-12 DIAGNOSIS — C15.9 ADENOCARCINOMA OF ESOPHAGUS (H): ICD-10-CM

## 2019-09-12 DIAGNOSIS — T50.995A ADVERSE EFFECT OF OTHER DRUGS, MEDICAMENTS AND BIOLOGICAL SUBSTANCES, INITIAL ENCOUNTER: ICD-10-CM

## 2019-09-12 DIAGNOSIS — D50.0 IRON DEFICIENCY ANEMIA DUE TO CHRONIC BLOOD LOSS: ICD-10-CM

## 2019-09-12 LAB
ALBUMIN SERPL-MCNC: 3.7 G/DL (ref 3.5–5)
ALP SERPL-CCNC: 46 U/L (ref 45–120)
ALT SERPL W P-5'-P-CCNC: <9 U/L (ref 0–45)
ANION GAP SERPL CALCULATED.3IONS-SCNC: 10 MMOL/L (ref 5–18)
AST SERPL W P-5'-P-CCNC: 13 U/L (ref 0–40)
BASOPHILS # BLD AUTO: 0 THOU/UL (ref 0–0.2)
BASOPHILS NFR BLD AUTO: 1 % (ref 0–2)
BILIRUB SERPL-MCNC: 0.5 MG/DL (ref 0–1)
BUN SERPL-MCNC: 18 MG/DL (ref 8–28)
CALCIUM SERPL-MCNC: 8.9 MG/DL (ref 8.5–10.5)
CHLORIDE BLD-SCNC: 108 MMOL/L (ref 98–107)
CO2 SERPL-SCNC: 22 MMOL/L (ref 22–31)
CREAT SERPL-MCNC: 1.38 MG/DL (ref 0.7–1.3)
EOSINOPHIL # BLD AUTO: 0.1 THOU/UL (ref 0–0.4)
EOSINOPHIL NFR BLD AUTO: 1 % (ref 0–6)
ERYTHROCYTE [DISTWIDTH] IN BLOOD BY AUTOMATED COUNT: 18 % (ref 11–14.5)
GFR SERPL CREATININE-BSD FRML MDRD: 50 ML/MIN/1.73M2
GLUCOSE BLD-MCNC: 119 MG/DL (ref 70–125)
HCT VFR BLD AUTO: 31 % (ref 40–54)
HGB BLD-MCNC: 9.3 G/DL (ref 14–18)
LYMPHOCYTES # BLD AUTO: 0.9 THOU/UL (ref 0.8–4.4)
LYMPHOCYTES NFR BLD AUTO: 18 % (ref 20–40)
MCH RBC QN AUTO: 25.3 PG (ref 27–34)
MCHC RBC AUTO-ENTMCNC: 30 G/DL (ref 32–36)
MCV RBC AUTO: 85 FL (ref 80–100)
MONOCYTES # BLD AUTO: 0.2 THOU/UL (ref 0–0.9)
MONOCYTES NFR BLD AUTO: 4 % (ref 2–10)
NEUTROPHILS # BLD AUTO: 3.8 THOU/UL (ref 2–7.7)
NEUTROPHILS NFR BLD AUTO: 75 % (ref 50–70)
PLATELET # BLD AUTO: 193 THOU/UL (ref 140–440)
PMV BLD AUTO: 10.8 FL (ref 8.5–12.5)
POTASSIUM BLD-SCNC: 4.5 MMOL/L (ref 3.5–5)
PROT SERPL-MCNC: 6.3 G/DL (ref 6–8)
RBC # BLD AUTO: 3.67 MILL/UL (ref 4.4–6.2)
SODIUM SERPL-SCNC: 140 MMOL/L (ref 136–145)
WBC: 5 THOU/UL (ref 4–11)

## 2019-09-19 ENCOUNTER — OFFICE VISIT - HEALTHEAST (OUTPATIENT)
Dept: ONCOLOGY | Facility: CLINIC | Age: 79
End: 2019-09-19

## 2019-09-19 ENCOUNTER — OFFICE VISIT - HEALTHEAST (OUTPATIENT)
Dept: RADIATION ONCOLOGY | Facility: CLINIC | Age: 79
End: 2019-09-19

## 2019-09-19 ENCOUNTER — AMBULATORY - HEALTHEAST (OUTPATIENT)
Dept: INFUSION THERAPY | Facility: CLINIC | Age: 79
End: 2019-09-19

## 2019-09-19 ENCOUNTER — INFUSION - HEALTHEAST (OUTPATIENT)
Dept: INFUSION THERAPY | Facility: CLINIC | Age: 79
End: 2019-09-19

## 2019-09-19 DIAGNOSIS — C15.9 ADENOCARCINOMA OF ESOPHAGUS (H): ICD-10-CM

## 2019-09-19 DIAGNOSIS — Z51.11 ENCOUNTER FOR ANTINEOPLASTIC CHEMOTHERAPY: ICD-10-CM

## 2019-09-19 LAB
ALBUMIN SERPL-MCNC: 3.5 G/DL (ref 3.5–5)
ALP SERPL-CCNC: 43 U/L (ref 45–120)
ALT SERPL W P-5'-P-CCNC: <9 U/L (ref 0–45)
ANION GAP SERPL CALCULATED.3IONS-SCNC: 8 MMOL/L (ref 5–18)
AST SERPL W P-5'-P-CCNC: 11 U/L (ref 0–40)
BASO STIPL BLD QL SMEAR: ABNORMAL
BASOPHILS # BLD AUTO: 0 THOU/UL (ref 0–0.2)
BASOPHILS NFR BLD AUTO: 0 % (ref 0–2)
BILIRUB SERPL-MCNC: 0.5 MG/DL (ref 0–1)
BUN SERPL-MCNC: 13 MG/DL (ref 8–28)
CALCIUM SERPL-MCNC: 8.6 MG/DL (ref 8.5–10.5)
CHLORIDE BLD-SCNC: 107 MMOL/L (ref 98–107)
CO2 SERPL-SCNC: 23 MMOL/L (ref 22–31)
CREAT SERPL-MCNC: 0.99 MG/DL (ref 0.7–1.3)
EOSINOPHIL # BLD AUTO: 0 THOU/UL (ref 0–0.4)
EOSINOPHIL NFR BLD AUTO: 1 % (ref 0–6)
ERYTHROCYTE [DISTWIDTH] IN BLOOD BY AUTOMATED COUNT: 20.5 % (ref 11–14.5)
GFR SERPL CREATININE-BSD FRML MDRD: >60 ML/MIN/1.73M2
GLUCOSE BLD-MCNC: 112 MG/DL (ref 70–125)
HCT VFR BLD AUTO: 30.4 % (ref 40–54)
HGB BLD-MCNC: 9.1 G/DL (ref 14–18)
LYMPHOCYTES # BLD AUTO: 0.5 THOU/UL (ref 0.8–4.4)
LYMPHOCYTES NFR BLD AUTO: 18 % (ref 20–40)
MCH RBC QN AUTO: 25.6 PG (ref 27–34)
MCHC RBC AUTO-ENTMCNC: 29.9 G/DL (ref 32–36)
MCV RBC AUTO: 85 FL (ref 80–100)
MONOCYTES # BLD AUTO: 0.2 THOU/UL (ref 0–0.9)
MONOCYTES NFR BLD AUTO: 6 % (ref 2–10)
NEUTROPHILS # BLD AUTO: 2.3 THOU/UL (ref 2–7.7)
NEUTROPHILS NFR BLD AUTO: 74 % (ref 50–70)
OVALOCYTES: ABNORMAL
PLAT MORPH BLD: NORMAL
PLATELET # BLD AUTO: 171 THOU/UL (ref 140–440)
PMV BLD AUTO: 11.9 FL (ref 8.5–12.5)
POLYCHROMASIA BLD QL SMEAR: ABNORMAL
POTASSIUM BLD-SCNC: 4.4 MMOL/L (ref 3.5–5)
PROT SERPL-MCNC: 6 G/DL (ref 6–8)
RBC # BLD AUTO: 3.56 MILL/UL (ref 4.4–6.2)
SODIUM SERPL-SCNC: 138 MMOL/L (ref 136–145)
WBC: 3.1 THOU/UL (ref 4–11)

## 2019-09-23 ENCOUNTER — COMMUNICATION - HEALTHEAST (OUTPATIENT)
Dept: ONCOLOGY | Facility: HOSPITAL | Age: 79
End: 2019-09-23

## 2019-09-23 ENCOUNTER — COMMUNICATION - HEALTHEAST (OUTPATIENT)
Dept: FAMILY MEDICINE | Facility: CLINIC | Age: 79
End: 2019-09-23

## 2019-09-23 DIAGNOSIS — F41.9 ANXIETY: ICD-10-CM

## 2019-09-26 ENCOUNTER — INFUSION - HEALTHEAST (OUTPATIENT)
Dept: INFUSION THERAPY | Facility: CLINIC | Age: 79
End: 2019-09-26

## 2019-09-26 ENCOUNTER — OFFICE VISIT - HEALTHEAST (OUTPATIENT)
Dept: ONCOLOGY | Facility: CLINIC | Age: 79
End: 2019-09-26

## 2019-09-26 ENCOUNTER — AMBULATORY - HEALTHEAST (OUTPATIENT)
Dept: INFUSION THERAPY | Facility: CLINIC | Age: 79
End: 2019-09-26

## 2019-09-26 ENCOUNTER — OFFICE VISIT - HEALTHEAST (OUTPATIENT)
Dept: RADIATION ONCOLOGY | Facility: CLINIC | Age: 79
End: 2019-09-26

## 2019-09-26 DIAGNOSIS — K92.1 MELENA: ICD-10-CM

## 2019-09-26 DIAGNOSIS — Z51.11 ENCOUNTER FOR ANTINEOPLASTIC CHEMOTHERAPY: ICD-10-CM

## 2019-09-26 DIAGNOSIS — D50.0 IRON DEFICIENCY ANEMIA DUE TO CHRONIC BLOOD LOSS: ICD-10-CM

## 2019-09-26 DIAGNOSIS — C15.9 ADENOCARCINOMA OF ESOPHAGUS (H): ICD-10-CM

## 2019-09-26 DIAGNOSIS — K21.00 GASTROESOPHAGEAL REFLUX DISEASE WITH ESOPHAGITIS: ICD-10-CM

## 2019-09-26 LAB
ALBUMIN SERPL-MCNC: 3.6 G/DL (ref 3.5–5)
ALP SERPL-CCNC: 39 U/L (ref 45–120)
ALT SERPL W P-5'-P-CCNC: <9 U/L (ref 0–45)
ANION GAP SERPL CALCULATED.3IONS-SCNC: 5 MMOL/L (ref 5–18)
AST SERPL W P-5'-P-CCNC: 11 U/L (ref 0–40)
BASOPHILS # BLD AUTO: 0 THOU/UL (ref 0–0.2)
BASOPHILS NFR BLD AUTO: 1 % (ref 0–2)
BILIRUB SERPL-MCNC: 0.6 MG/DL (ref 0–1)
BUN SERPL-MCNC: 14 MG/DL (ref 8–28)
CALCIUM SERPL-MCNC: 8.6 MG/DL (ref 8.5–10.5)
CHLORIDE BLD-SCNC: 111 MMOL/L (ref 98–107)
CO2 SERPL-SCNC: 22 MMOL/L (ref 22–31)
CREAT SERPL-MCNC: 1.05 MG/DL (ref 0.7–1.3)
EOSINOPHIL # BLD AUTO: 0 THOU/UL (ref 0–0.4)
EOSINOPHIL NFR BLD AUTO: 1 % (ref 0–6)
ERYTHROCYTE [DISTWIDTH] IN BLOOD BY AUTOMATED COUNT: 22.3 % (ref 11–14.5)
GFR SERPL CREATININE-BSD FRML MDRD: >60 ML/MIN/1.73M2
GLUCOSE BLD-MCNC: 106 MG/DL (ref 70–125)
HCT VFR BLD AUTO: 31.9 % (ref 40–54)
HGB BLD-MCNC: 9.6 G/DL (ref 14–18)
LYMPHOCYTES # BLD AUTO: 0.4 THOU/UL (ref 0.8–4.4)
LYMPHOCYTES NFR BLD AUTO: 17 % (ref 20–40)
MCH RBC QN AUTO: 25.5 PG (ref 27–34)
MCHC RBC AUTO-ENTMCNC: 30.1 G/DL (ref 32–36)
MCV RBC AUTO: 85 FL (ref 80–100)
MONOCYTES # BLD AUTO: 0.2 THOU/UL (ref 0–0.9)
MONOCYTES NFR BLD AUTO: 7 % (ref 2–10)
NEUTROPHILS # BLD AUTO: 1.6 THOU/UL (ref 2–7.7)
NEUTROPHILS NFR BLD AUTO: 73 % (ref 50–70)
OVALOCYTES: ABNORMAL
PLAT MORPH BLD: ABNORMAL
PLATELET # BLD AUTO: 126 THOU/UL (ref 140–440)
PMV BLD AUTO: 10.4 FL (ref 8.5–12.5)
POTASSIUM BLD-SCNC: 4.1 MMOL/L (ref 3.5–5)
PROT SERPL-MCNC: 6 G/DL (ref 6–8)
RBC # BLD AUTO: 3.76 MILL/UL (ref 4.4–6.2)
SODIUM SERPL-SCNC: 138 MMOL/L (ref 136–145)
WBC: 2.2 THOU/UL (ref 4–11)

## 2019-09-30 ENCOUNTER — AMBULATORY - HEALTHEAST (OUTPATIENT)
Dept: RADIATION ONCOLOGY | Facility: CLINIC | Age: 79
End: 2019-09-30

## 2019-10-01 ENCOUNTER — HEALTH MAINTENANCE LETTER (OUTPATIENT)
Age: 79
End: 2019-10-01

## 2019-10-03 ENCOUNTER — INFUSION - HEALTHEAST (OUTPATIENT)
Dept: INFUSION THERAPY | Facility: CLINIC | Age: 79
End: 2019-10-03

## 2019-10-03 ENCOUNTER — AMBULATORY - HEALTHEAST (OUTPATIENT)
Dept: INFUSION THERAPY | Facility: CLINIC | Age: 79
End: 2019-10-03

## 2019-10-03 ENCOUNTER — OFFICE VISIT - HEALTHEAST (OUTPATIENT)
Dept: ONCOLOGY | Facility: CLINIC | Age: 79
End: 2019-10-03

## 2019-10-03 ENCOUNTER — OFFICE VISIT - HEALTHEAST (OUTPATIENT)
Dept: RADIATION ONCOLOGY | Facility: CLINIC | Age: 79
End: 2019-10-03

## 2019-10-03 DIAGNOSIS — Z51.11 ENCOUNTER FOR ANTINEOPLASTIC CHEMOTHERAPY: ICD-10-CM

## 2019-10-03 DIAGNOSIS — C15.9 ADENOCARCINOMA OF ESOPHAGUS (H): ICD-10-CM

## 2019-10-03 LAB
ALBUMIN SERPL-MCNC: 3.5 G/DL (ref 3.5–5)
ALP SERPL-CCNC: 45 U/L (ref 45–120)
ALT SERPL W P-5'-P-CCNC: <9 U/L (ref 0–45)
ANION GAP SERPL CALCULATED.3IONS-SCNC: 6 MMOL/L (ref 5–18)
AST SERPL W P-5'-P-CCNC: 13 U/L (ref 0–40)
BASOPHILS # BLD AUTO: 0 THOU/UL (ref 0–0.2)
BASOPHILS NFR BLD AUTO: 0 % (ref 0–2)
BILIRUB SERPL-MCNC: 0.7 MG/DL (ref 0–1)
BUN SERPL-MCNC: 12 MG/DL (ref 8–28)
CALCIUM SERPL-MCNC: 8.8 MG/DL (ref 8.5–10.5)
CHLORIDE BLD-SCNC: 107 MMOL/L (ref 98–107)
CO2 SERPL-SCNC: 24 MMOL/L (ref 22–31)
CREAT SERPL-MCNC: 0.98 MG/DL (ref 0.7–1.3)
EOSINOPHIL # BLD AUTO: 0 THOU/UL (ref 0–0.4)
EOSINOPHIL NFR BLD AUTO: 1 % (ref 0–6)
ERYTHROCYTE [DISTWIDTH] IN BLOOD BY AUTOMATED COUNT: 23.3 % (ref 11–14.5)
GFR SERPL CREATININE-BSD FRML MDRD: >60 ML/MIN/1.73M2
GLUCOSE BLD-MCNC: 118 MG/DL (ref 70–125)
HCT VFR BLD AUTO: 34.4 % (ref 40–54)
HGB BLD-MCNC: 10.5 G/DL (ref 14–18)
LYMPHOCYTES # BLD AUTO: 0.3 THOU/UL (ref 0.8–4.4)
LYMPHOCYTES NFR BLD AUTO: 13 % (ref 20–40)
MCH RBC QN AUTO: 25.9 PG (ref 27–34)
MCHC RBC AUTO-ENTMCNC: 30.5 G/DL (ref 32–36)
MCV RBC AUTO: 85 FL (ref 80–100)
MONOCYTES # BLD AUTO: 0.2 THOU/UL (ref 0–0.9)
MONOCYTES NFR BLD AUTO: 7 % (ref 2–10)
NEUTROPHILS # BLD AUTO: 1.9 THOU/UL (ref 2–7.7)
NEUTROPHILS NFR BLD AUTO: 78 % (ref 50–70)
PLATELET # BLD AUTO: 109 THOU/UL (ref 140–440)
PMV BLD AUTO: 11 FL (ref 8.5–12.5)
POTASSIUM BLD-SCNC: 4.1 MMOL/L (ref 3.5–5)
PROT SERPL-MCNC: 6.1 G/DL (ref 6–8)
RBC # BLD AUTO: 4.05 MILL/UL (ref 4.4–6.2)
SODIUM SERPL-SCNC: 137 MMOL/L (ref 136–145)
WBC: 2.4 THOU/UL (ref 4–11)

## 2019-10-10 ENCOUNTER — OFFICE VISIT - HEALTHEAST (OUTPATIENT)
Dept: RADIATION ONCOLOGY | Facility: CLINIC | Age: 79
End: 2019-10-10

## 2019-10-10 ENCOUNTER — INFUSION - HEALTHEAST (OUTPATIENT)
Dept: INFUSION THERAPY | Facility: CLINIC | Age: 79
End: 2019-10-10

## 2019-10-10 ENCOUNTER — OFFICE VISIT - HEALTHEAST (OUTPATIENT)
Dept: ONCOLOGY | Facility: CLINIC | Age: 79
End: 2019-10-10

## 2019-10-10 ENCOUNTER — AMBULATORY - HEALTHEAST (OUTPATIENT)
Dept: INFUSION THERAPY | Facility: CLINIC | Age: 79
End: 2019-10-10

## 2019-10-10 DIAGNOSIS — C15.9 ADENOCARCINOMA OF ESOPHAGUS (H): ICD-10-CM

## 2019-10-10 DIAGNOSIS — Z51.11 ENCOUNTER FOR ANTINEOPLASTIC CHEMOTHERAPY: ICD-10-CM

## 2019-10-10 LAB
ALBUMIN SERPL-MCNC: 3.6 G/DL (ref 3.5–5)
ALP SERPL-CCNC: 51 U/L (ref 45–120)
ALT SERPL W P-5'-P-CCNC: <9 U/L (ref 0–45)
ANION GAP SERPL CALCULATED.3IONS-SCNC: 9 MMOL/L (ref 5–18)
AST SERPL W P-5'-P-CCNC: 11 U/L (ref 0–40)
BASOPHILS # BLD AUTO: 0 THOU/UL (ref 0–0.2)
BASOPHILS NFR BLD AUTO: 1 % (ref 0–2)
BILIRUB SERPL-MCNC: 0.7 MG/DL (ref 0–1)
BUN SERPL-MCNC: 11 MG/DL (ref 8–28)
CALCIUM SERPL-MCNC: 8.6 MG/DL (ref 8.5–10.5)
CHLORIDE BLD-SCNC: 108 MMOL/L (ref 98–107)
CO2 SERPL-SCNC: 21 MMOL/L (ref 22–31)
CREAT SERPL-MCNC: 1.12 MG/DL (ref 0.7–1.3)
EOSINOPHIL # BLD AUTO: 0 THOU/UL (ref 0–0.4)
EOSINOPHIL NFR BLD AUTO: 1 % (ref 0–6)
ERYTHROCYTE [DISTWIDTH] IN BLOOD BY AUTOMATED COUNT: 24 % (ref 11–14.5)
GFR SERPL CREATININE-BSD FRML MDRD: >60 ML/MIN/1.73M2
GLUCOSE BLD-MCNC: 116 MG/DL (ref 70–125)
HCT VFR BLD AUTO: 33.6 % (ref 40–54)
HGB BLD-MCNC: 10.3 G/DL (ref 14–18)
LYMPHOCYTES # BLD AUTO: 0.2 THOU/UL (ref 0.8–4.4)
LYMPHOCYTES NFR BLD AUTO: 10 % (ref 20–40)
MCH RBC QN AUTO: 26.3 PG (ref 27–34)
MCHC RBC AUTO-ENTMCNC: 30.7 G/DL (ref 32–36)
MCV RBC AUTO: 86 FL (ref 80–100)
MONOCYTES # BLD AUTO: 0.2 THOU/UL (ref 0–0.9)
MONOCYTES NFR BLD AUTO: 9 % (ref 2–10)
NEUTROPHILS # BLD AUTO: 1.7 THOU/UL (ref 2–7.7)
NEUTROPHILS NFR BLD AUTO: 79 % (ref 50–70)
PLATELET # BLD AUTO: 128 THOU/UL (ref 140–440)
PMV BLD AUTO: 10 FL (ref 8.5–12.5)
POTASSIUM BLD-SCNC: 4.1 MMOL/L (ref 3.5–5)
PROT SERPL-MCNC: 6.1 G/DL (ref 6–8)
RBC # BLD AUTO: 3.92 MILL/UL (ref 4.4–6.2)
SODIUM SERPL-SCNC: 138 MMOL/L (ref 136–145)
WBC: 2.1 THOU/UL (ref 4–11)

## 2019-10-14 ENCOUNTER — AMBULATORY - HEALTHEAST (OUTPATIENT)
Dept: RADIATION ONCOLOGY | Facility: CLINIC | Age: 79
End: 2019-10-14

## 2019-10-17 ENCOUNTER — AMBULATORY - HEALTHEAST (OUTPATIENT)
Dept: INFUSION THERAPY | Facility: CLINIC | Age: 79
End: 2019-10-17

## 2019-10-17 ENCOUNTER — OFFICE VISIT - HEALTHEAST (OUTPATIENT)
Dept: ONCOLOGY | Facility: CLINIC | Age: 79
End: 2019-10-17

## 2019-10-17 ENCOUNTER — INFUSION - HEALTHEAST (OUTPATIENT)
Dept: INFUSION THERAPY | Facility: CLINIC | Age: 79
End: 2019-10-17

## 2019-10-17 ENCOUNTER — COMMUNICATION - HEALTHEAST (OUTPATIENT)
Dept: ONCOLOGY | Facility: CLINIC | Age: 79
End: 2019-10-17

## 2019-10-17 DIAGNOSIS — T50.995A ADVERSE EFFECT OF OTHER DRUGS, MEDICAMENTS AND BIOLOGICAL SUBSTANCES, INITIAL ENCOUNTER: ICD-10-CM

## 2019-10-17 DIAGNOSIS — C15.9 ADENOCARCINOMA OF ESOPHAGUS (H): ICD-10-CM

## 2019-10-17 DIAGNOSIS — E86.0 DEHYDRATION: ICD-10-CM

## 2019-10-17 DIAGNOSIS — D50.0 IRON DEFICIENCY ANEMIA DUE TO CHRONIC BLOOD LOSS: ICD-10-CM

## 2019-10-17 DIAGNOSIS — R52 PAIN: ICD-10-CM

## 2019-10-17 DIAGNOSIS — T50.905A ADVERSE EFFECT OF DRUG, INITIAL ENCOUNTER: ICD-10-CM

## 2019-10-17 DIAGNOSIS — C15.5 MALIGNANT NEOPLASM OF LOWER THIRD OF ESOPHAGUS (H): ICD-10-CM

## 2019-10-17 LAB
ALBUMIN SERPL-MCNC: 3.5 G/DL (ref 3.5–5)
ALP SERPL-CCNC: 55 U/L (ref 45–120)
ALT SERPL W P-5'-P-CCNC: <9 U/L (ref 0–45)
ANION GAP SERPL CALCULATED.3IONS-SCNC: 5 MMOL/L (ref 5–18)
AST SERPL W P-5'-P-CCNC: 12 U/L (ref 0–40)
BASOPHILS # BLD AUTO: 0 THOU/UL (ref 0–0.2)
BASOPHILS NFR BLD AUTO: 1 % (ref 0–2)
BILIRUB SERPL-MCNC: 0.8 MG/DL (ref 0–1)
BUN SERPL-MCNC: 10 MG/DL (ref 8–28)
CALCIUM SERPL-MCNC: 8.9 MG/DL (ref 8.5–10.5)
CHLORIDE BLD-SCNC: 106 MMOL/L (ref 98–107)
CO2 SERPL-SCNC: 25 MMOL/L (ref 22–31)
CREAT SERPL-MCNC: 1.07 MG/DL (ref 0.7–1.3)
EOSINOPHIL # BLD AUTO: 0 THOU/UL (ref 0–0.4)
EOSINOPHIL NFR BLD AUTO: 1 % (ref 0–6)
ERYTHROCYTE [DISTWIDTH] IN BLOOD BY AUTOMATED COUNT: 24.7 % (ref 11–14.5)
GFR SERPL CREATININE-BSD FRML MDRD: >60 ML/MIN/1.73M2
GLUCOSE BLD-MCNC: 102 MG/DL (ref 70–125)
HCT VFR BLD AUTO: 33.7 % (ref 40–54)
HGB BLD-MCNC: 10.6 G/DL (ref 14–18)
LYMPHOCYTES # BLD AUTO: 0.3 THOU/UL (ref 0.8–4.4)
LYMPHOCYTES NFR BLD AUTO: 16 % (ref 20–40)
MCH RBC QN AUTO: 26.6 PG (ref 27–34)
MCHC RBC AUTO-ENTMCNC: 31.5 G/DL (ref 32–36)
MCV RBC AUTO: 85 FL (ref 80–100)
MONOCYTES # BLD AUTO: 0.2 THOU/UL (ref 0–0.9)
MONOCYTES NFR BLD AUTO: 12 % (ref 2–10)
NEUTROPHILS # BLD AUTO: 1.2 THOU/UL (ref 2–7.7)
NEUTROPHILS NFR BLD AUTO: 70 % (ref 50–70)
OVALOCYTES: ABNORMAL
PLAT MORPH BLD: ABNORMAL
PLATELET # BLD AUTO: 131 THOU/UL (ref 140–440)
PMV BLD AUTO: 10.4 FL (ref 8.5–12.5)
POTASSIUM BLD-SCNC: 4.1 MMOL/L (ref 3.5–5)
PROT SERPL-MCNC: 6.1 G/DL (ref 6–8)
RBC # BLD AUTO: 3.99 MILL/UL (ref 4.4–6.2)
SODIUM SERPL-SCNC: 136 MMOL/L (ref 136–145)
WBC: 1.7 THOU/UL (ref 4–11)

## 2019-10-22 ENCOUNTER — AMBULATORY - HEALTHEAST (OUTPATIENT)
Dept: ONCOLOGY | Facility: CLINIC | Age: 79
End: 2019-10-22

## 2019-10-22 DIAGNOSIS — C15.5 MALIGNANT NEOPLASM OF LOWER THIRD OF ESOPHAGUS (H): ICD-10-CM

## 2019-10-24 ENCOUNTER — AMBULATORY - HEALTHEAST (OUTPATIENT)
Dept: INFUSION THERAPY | Facility: CLINIC | Age: 79
End: 2019-10-24

## 2019-10-24 ENCOUNTER — COMMUNICATION - HEALTHEAST (OUTPATIENT)
Dept: RADIATION ONCOLOGY | Facility: CLINIC | Age: 79
End: 2019-10-24

## 2019-10-24 ENCOUNTER — OFFICE VISIT - HEALTHEAST (OUTPATIENT)
Dept: ONCOLOGY | Facility: CLINIC | Age: 79
End: 2019-10-24

## 2019-10-24 DIAGNOSIS — R42 ORTHOSTATIC DIZZINESS: ICD-10-CM

## 2019-10-24 DIAGNOSIS — C15.9 ADENOCARCINOMA OF ESOPHAGUS (H): ICD-10-CM

## 2019-10-24 DIAGNOSIS — K21.00 GASTROESOPHAGEAL REFLUX DISEASE WITH ESOPHAGITIS: ICD-10-CM

## 2019-10-24 DIAGNOSIS — C15.5 MALIGNANT NEOPLASM OF LOWER THIRD OF ESOPHAGUS (H): ICD-10-CM

## 2019-10-24 LAB
ALBUMIN SERPL-MCNC: 3.3 G/DL (ref 3.5–5)
ALP SERPL-CCNC: 51 U/L (ref 45–120)
ALT SERPL W P-5'-P-CCNC: <9 U/L (ref 0–45)
ANION GAP SERPL CALCULATED.3IONS-SCNC: 11 MMOL/L (ref 5–18)
AST SERPL W P-5'-P-CCNC: 16 U/L (ref 0–40)
BASOPHILS # BLD AUTO: 0 THOU/UL (ref 0–0.2)
BASOPHILS NFR BLD AUTO: 1 % (ref 0–2)
BILIRUB SERPL-MCNC: 0.5 MG/DL (ref 0–1)
BUN SERPL-MCNC: 12 MG/DL (ref 8–28)
CALCIUM SERPL-MCNC: 8.7 MG/DL (ref 8.5–10.5)
CHLORIDE BLD-SCNC: 102 MMOL/L (ref 98–107)
CO2 SERPL-SCNC: 26 MMOL/L (ref 22–31)
CREAT SERPL-MCNC: 1.19 MG/DL (ref 0.7–1.3)
EOSINOPHIL # BLD AUTO: 0 THOU/UL (ref 0–0.4)
EOSINOPHIL NFR BLD AUTO: 1 % (ref 0–6)
ERYTHROCYTE [DISTWIDTH] IN BLOOD BY AUTOMATED COUNT: 25.2 % (ref 11–14.5)
GFR SERPL CREATININE-BSD FRML MDRD: 59 ML/MIN/1.73M2
GLUCOSE BLD-MCNC: 116 MG/DL (ref 70–125)
HCT VFR BLD AUTO: 33.3 % (ref 40–54)
HGB BLD-MCNC: 10.3 G/DL (ref 14–18)
LYMPHOCYTES # BLD AUTO: 0.4 THOU/UL (ref 0.8–4.4)
LYMPHOCYTES NFR BLD AUTO: 14 % (ref 20–40)
MCH RBC QN AUTO: 26.8 PG (ref 27–34)
MCHC RBC AUTO-ENTMCNC: 30.9 G/DL (ref 32–36)
MCV RBC AUTO: 87 FL (ref 80–100)
MONOCYTES # BLD AUTO: 0.4 THOU/UL (ref 0–0.9)
MONOCYTES NFR BLD AUTO: 13 % (ref 2–10)
NEUTROPHILS # BLD AUTO: 2.1 THOU/UL (ref 2–7.7)
NEUTROPHILS NFR BLD AUTO: 70 % (ref 50–70)
PLATELET # BLD AUTO: 149 THOU/UL (ref 140–440)
PMV BLD AUTO: 10.8 FL (ref 8.5–12.5)
POTASSIUM BLD-SCNC: 4.1 MMOL/L (ref 3.5–5)
PROT SERPL-MCNC: 5.8 G/DL (ref 6–8)
RBC # BLD AUTO: 3.85 MILL/UL (ref 4.4–6.2)
SODIUM SERPL-SCNC: 139 MMOL/L (ref 136–145)
WBC: 3 THOU/UL (ref 4–11)

## 2019-10-24 ASSESSMENT — MIFFLIN-ST. JEOR: SCORE: 1647.95

## 2019-11-14 ENCOUNTER — OFFICE VISIT - HEALTHEAST (OUTPATIENT)
Dept: RADIATION ONCOLOGY | Facility: CLINIC | Age: 79
End: 2019-11-14

## 2019-11-14 ENCOUNTER — INFUSION - HEALTHEAST (OUTPATIENT)
Dept: INFUSION THERAPY | Facility: CLINIC | Age: 79
End: 2019-11-14

## 2019-11-14 ENCOUNTER — COMMUNICATION - HEALTHEAST (OUTPATIENT)
Dept: ONCOLOGY | Facility: CLINIC | Age: 79
End: 2019-11-14

## 2019-11-14 DIAGNOSIS — T50.995A ADVERSE EFFECT OF OTHER DRUGS, MEDICAMENTS AND BIOLOGICAL SUBSTANCES, INITIAL ENCOUNTER: ICD-10-CM

## 2019-11-14 DIAGNOSIS — C15.9 ADENOCARCINOMA OF ESOPHAGUS (H): ICD-10-CM

## 2019-11-14 DIAGNOSIS — T50.905A ADVERSE EFFECT OF DRUG, INITIAL ENCOUNTER: ICD-10-CM

## 2019-11-14 DIAGNOSIS — K92.1 MELENA: ICD-10-CM

## 2019-11-14 DIAGNOSIS — E86.0 DEHYDRATION: ICD-10-CM

## 2019-11-14 DIAGNOSIS — D50.0 IRON DEFICIENCY ANEMIA DUE TO CHRONIC BLOOD LOSS: ICD-10-CM

## 2019-11-26 ENCOUNTER — AMBULATORY - HEALTHEAST (OUTPATIENT)
Dept: INFUSION THERAPY | Facility: CLINIC | Age: 79
End: 2019-11-26

## 2019-11-26 ENCOUNTER — OFFICE VISIT - HEALTHEAST (OUTPATIENT)
Dept: ONCOLOGY | Facility: CLINIC | Age: 79
End: 2019-11-26

## 2019-11-26 DIAGNOSIS — Z51.11 ENCOUNTER FOR ANTINEOPLASTIC CHEMOTHERAPY: ICD-10-CM

## 2019-11-26 DIAGNOSIS — C15.5 MALIGNANT NEOPLASM OF LOWER THIRD OF ESOPHAGUS (H): ICD-10-CM

## 2019-11-26 DIAGNOSIS — C15.9 ADENOCARCINOMA OF ESOPHAGUS (H): ICD-10-CM

## 2019-11-26 DIAGNOSIS — D50.0 IRON DEFICIENCY ANEMIA DUE TO CHRONIC BLOOD LOSS: ICD-10-CM

## 2019-11-26 DIAGNOSIS — K21.9 GASTROESOPHAGEAL REFLUX DISEASE WITHOUT ESOPHAGITIS: ICD-10-CM

## 2019-11-26 LAB
ANION GAP SERPL CALCULATED.3IONS-SCNC: 10 MMOL/L (ref 5–18)
BASOPHILS # BLD AUTO: 0 THOU/UL (ref 0–0.2)
BASOPHILS NFR BLD AUTO: 1 % (ref 0–2)
BUN SERPL-MCNC: 13 MG/DL (ref 8–28)
CALCIUM SERPL-MCNC: 9 MG/DL (ref 8.5–10.5)
CHLORIDE BLD-SCNC: 107 MMOL/L (ref 98–107)
CO2 SERPL-SCNC: 24 MMOL/L (ref 22–31)
CREAT SERPL-MCNC: 1.14 MG/DL (ref 0.7–1.3)
EOSINOPHIL # BLD AUTO: 0.1 THOU/UL (ref 0–0.4)
EOSINOPHIL NFR BLD AUTO: 2 % (ref 0–6)
ERYTHROCYTE [DISTWIDTH] IN BLOOD BY AUTOMATED COUNT: 23 % (ref 11–14.5)
GFR SERPL CREATININE-BSD FRML MDRD: >60 ML/MIN/1.73M2
GLUCOSE BLD-MCNC: 112 MG/DL (ref 70–125)
HCT VFR BLD AUTO: 40.5 % (ref 40–54)
HGB BLD-MCNC: 12.3 G/DL (ref 14–18)
LYMPHOCYTES # BLD AUTO: 1.7 THOU/UL (ref 0.8–4.4)
LYMPHOCYTES NFR BLD AUTO: 38 % (ref 20–40)
MCH RBC QN AUTO: 27.4 PG (ref 27–34)
MCHC RBC AUTO-ENTMCNC: 30.4 G/DL (ref 32–36)
MCV RBC AUTO: 90 FL (ref 80–100)
MONOCYTES # BLD AUTO: 0.4 THOU/UL (ref 0–0.9)
MONOCYTES NFR BLD AUTO: 8 % (ref 2–10)
NEUTROPHILS # BLD AUTO: 2.3 THOU/UL (ref 2–7.7)
NEUTROPHILS NFR BLD AUTO: 51 % (ref 50–70)
OVALOCYTES: ABNORMAL
PLAT MORPH BLD: ABNORMAL
PLATELET # BLD AUTO: 134 THOU/UL (ref 140–440)
PMV BLD AUTO: 10.6 FL (ref 8.5–12.5)
POTASSIUM BLD-SCNC: 4 MMOL/L (ref 3.5–5)
RBC # BLD AUTO: 4.49 MILL/UL (ref 4.4–6.2)
SODIUM SERPL-SCNC: 141 MMOL/L (ref 136–145)
WBC: 4.5 THOU/UL (ref 4–11)

## 2019-12-10 ENCOUNTER — HOSPITAL ENCOUNTER (OUTPATIENT)
Dept: PET IMAGING | Facility: HOSPITAL | Age: 79
Setting detail: RADIATION/ONCOLOGY SERIES
Discharge: STILL ADMITTED OR ADMINISTRATIVE | End: 2019-12-10
Attending: INTERNAL MEDICINE

## 2019-12-10 DIAGNOSIS — C15.5 MALIGNANT NEOPLASM OF LOWER THIRD OF ESOPHAGUS (H): ICD-10-CM

## 2019-12-10 LAB — GLUCOSE BLDC GLUCOMTR-MCNC: 88 MG/DL (ref 70–139)

## 2019-12-10 ASSESSMENT — MIFFLIN-ST. JEOR: SCORE: 1639.79

## 2019-12-12 ENCOUNTER — AMBULATORY - HEALTHEAST (OUTPATIENT)
Dept: INFUSION THERAPY | Facility: CLINIC | Age: 79
End: 2019-12-12

## 2019-12-12 ENCOUNTER — OFFICE VISIT - HEALTHEAST (OUTPATIENT)
Dept: ONCOLOGY | Facility: CLINIC | Age: 79
End: 2019-12-12

## 2019-12-12 DIAGNOSIS — C15.9 ADENOCARCINOMA OF ESOPHAGUS (H): ICD-10-CM

## 2019-12-12 DIAGNOSIS — Z51.11 ENCOUNTER FOR ANTINEOPLASTIC CHEMOTHERAPY: ICD-10-CM

## 2019-12-12 DIAGNOSIS — D50.0 IRON DEFICIENCY ANEMIA DUE TO CHRONIC BLOOD LOSS: ICD-10-CM

## 2019-12-12 DIAGNOSIS — C15.5 MALIGNANT NEOPLASM OF LOWER THIRD OF ESOPHAGUS (H): ICD-10-CM

## 2019-12-12 LAB
ALBUMIN SERPL-MCNC: 3.8 G/DL (ref 3.5–5)
ALP SERPL-CCNC: 56 U/L (ref 45–120)
ALT SERPL W P-5'-P-CCNC: <9 U/L (ref 0–45)
ANION GAP SERPL CALCULATED.3IONS-SCNC: 10 MMOL/L (ref 5–18)
AST SERPL W P-5'-P-CCNC: 15 U/L (ref 0–40)
BASOPHILS # BLD AUTO: 0 THOU/UL (ref 0–0.2)
BASOPHILS NFR BLD AUTO: 0 % (ref 0–2)
BILIRUB SERPL-MCNC: 0.8 MG/DL (ref 0–1)
BUN SERPL-MCNC: 14 MG/DL (ref 8–28)
CALCIUM SERPL-MCNC: 8.9 MG/DL (ref 8.5–10.5)
CHLORIDE BLD-SCNC: 108 MMOL/L (ref 98–107)
CO2 SERPL-SCNC: 23 MMOL/L (ref 22–31)
CREAT SERPL-MCNC: 1.1 MG/DL (ref 0.7–1.3)
EOSINOPHIL # BLD AUTO: 0.1 THOU/UL (ref 0–0.4)
EOSINOPHIL NFR BLD AUTO: 1 % (ref 0–6)
ERYTHROCYTE [DISTWIDTH] IN BLOOD BY AUTOMATED COUNT: 19.5 % (ref 11–14.5)
GFR SERPL CREATININE-BSD FRML MDRD: >60 ML/MIN/1.73M2
GLUCOSE BLD-MCNC: 112 MG/DL (ref 70–125)
HCT VFR BLD AUTO: 39.5 % (ref 40–54)
HGB BLD-MCNC: 12.3 G/DL (ref 14–18)
LYMPHOCYTES # BLD AUTO: 1.4 THOU/UL (ref 0.8–4.4)
LYMPHOCYTES NFR BLD AUTO: 30 % (ref 20–40)
MCH RBC QN AUTO: 28.1 PG (ref 27–34)
MCHC RBC AUTO-ENTMCNC: 31.1 G/DL (ref 32–36)
MCV RBC AUTO: 90 FL (ref 80–100)
MONOCYTES # BLD AUTO: 0.4 THOU/UL (ref 0–0.9)
MONOCYTES NFR BLD AUTO: 8 % (ref 2–10)
NEUTROPHILS # BLD AUTO: 2.8 THOU/UL (ref 2–7.7)
NEUTROPHILS NFR BLD AUTO: 61 % (ref 50–70)
PLATELET # BLD AUTO: 128 THOU/UL (ref 140–440)
PMV BLD AUTO: 9.9 FL (ref 8.5–12.5)
POTASSIUM BLD-SCNC: 4 MMOL/L (ref 3.5–5)
PROT SERPL-MCNC: 6.5 G/DL (ref 6–8)
RBC # BLD AUTO: 4.37 MILL/UL (ref 4.4–6.2)
SODIUM SERPL-SCNC: 141 MMOL/L (ref 136–145)
WBC: 4.5 THOU/UL (ref 4–11)

## 2019-12-15 ENCOUNTER — HEALTH MAINTENANCE LETTER (OUTPATIENT)
Age: 79
End: 2019-12-15

## 2019-12-19 ENCOUNTER — RECORDS - HEALTHEAST (OUTPATIENT)
Dept: ADMINISTRATIVE | Facility: OTHER | Age: 79
End: 2019-12-19

## 2020-01-01 ENCOUNTER — INFUSION - HEALTHEAST (OUTPATIENT)
Dept: INFUSION THERAPY | Facility: CLINIC | Age: 80
End: 2020-01-01

## 2020-01-01 ENCOUNTER — OFFICE VISIT - HEALTHEAST (OUTPATIENT)
Dept: ONCOLOGY | Facility: CLINIC | Age: 80
End: 2020-01-01

## 2020-01-01 ENCOUNTER — COMMUNICATION - HEALTHEAST (OUTPATIENT)
Dept: FAMILY MEDICINE | Facility: CLINIC | Age: 80
End: 2020-01-01

## 2020-01-01 ENCOUNTER — COMMUNICATION - HEALTHEAST (OUTPATIENT)
Dept: GERIATRICS | Facility: CLINIC | Age: 80
End: 2020-01-01

## 2020-01-01 ENCOUNTER — SURGERY - HEALTHEAST (OUTPATIENT)
Dept: SURGERY | Facility: CLINIC | Age: 80
End: 2020-01-01

## 2020-01-01 ENCOUNTER — RECORDS - HEALTHEAST (OUTPATIENT)
Dept: ADMINISTRATIVE | Facility: OTHER | Age: 80
End: 2020-01-01

## 2020-01-01 ENCOUNTER — OFFICE VISIT - HEALTHEAST (OUTPATIENT)
Dept: GERIATRICS | Facility: CLINIC | Age: 80
End: 2020-01-01

## 2020-01-01 ENCOUNTER — COMMUNICATION - HEALTHEAST (OUTPATIENT)
Dept: ONCOLOGY | Facility: HOSPITAL | Age: 80
End: 2020-01-01

## 2020-01-01 ENCOUNTER — ANESTHESIA - HEALTHEAST (OUTPATIENT)
Dept: SURGERY | Facility: CLINIC | Age: 80
End: 2020-01-01

## 2020-01-01 ENCOUNTER — AMBULATORY - HEALTHEAST (OUTPATIENT)
Dept: INFUSION THERAPY | Facility: CLINIC | Age: 80
End: 2020-01-01

## 2020-01-01 ENCOUNTER — COMMUNICATION - HEALTHEAST (OUTPATIENT)
Dept: ONCOLOGY | Facility: CLINIC | Age: 80
End: 2020-01-01

## 2020-01-01 ENCOUNTER — TELEPHONE (OUTPATIENT)
Dept: NEUROLOGY | Facility: CLINIC | Age: 80
End: 2020-01-01

## 2020-01-01 ENCOUNTER — OFFICE VISIT - HEALTHEAST (OUTPATIENT)
Dept: FAMILY MEDICINE | Facility: CLINIC | Age: 80
End: 2020-01-01

## 2020-01-01 ENCOUNTER — HOME CARE/HOSPICE - HEALTHEAST (OUTPATIENT)
Dept: HOME HEALTH SERVICES | Facility: HOME HEALTH | Age: 80
End: 2020-01-01

## 2020-01-01 ENCOUNTER — HOSPITAL ENCOUNTER (OUTPATIENT)
Dept: CT IMAGING | Facility: CLINIC | Age: 80
Setting detail: RADIATION/ONCOLOGY SERIES
Discharge: STILL ADMITTED OR ADMINISTRATIVE | End: 2020-09-01
Attending: INTERNAL MEDICINE

## 2020-01-01 ENCOUNTER — VIRTUAL VISIT (OUTPATIENT)
Dept: NEUROLOGY | Facility: CLINIC | Age: 80
End: 2020-01-01
Payer: MEDICARE

## 2020-01-01 ENCOUNTER — HOSPITAL ENCOUNTER (OUTPATIENT)
Dept: PET IMAGING | Facility: HOSPITAL | Age: 80
Discharge: HOME | End: 2020-12-23
Attending: INTERNAL MEDICINE

## 2020-01-01 ENCOUNTER — HOSPITAL ENCOUNTER (OUTPATIENT)
Dept: CT IMAGING | Facility: CLINIC | Age: 80
Setting detail: RADIATION/ONCOLOGY SERIES
Discharge: STILL ADMITTED OR ADMINISTRATIVE | End: 2020-12-15
Attending: INTERNAL MEDICINE

## 2020-01-01 ENCOUNTER — AMBULATORY - HEALTHEAST (OUTPATIENT)
Dept: GERIATRICS | Facility: CLINIC | Age: 80
End: 2020-01-01

## 2020-01-01 ENCOUNTER — COMMUNICATION - HEALTHEAST (OUTPATIENT)
Dept: HOME HEALTH SERVICES | Facility: HOME HEALTH | Age: 80
End: 2020-01-01

## 2020-01-01 ENCOUNTER — COMMUNICATION - HEALTHEAST (OUTPATIENT)
Dept: ADMINISTRATIVE | Facility: HOSPITAL | Age: 80
End: 2020-01-01

## 2020-01-01 ENCOUNTER — AMBULATORY - HEALTHEAST (OUTPATIENT)
Dept: ONCOLOGY | Facility: CLINIC | Age: 80
End: 2020-01-01

## 2020-01-01 DIAGNOSIS — C15.5 MALIGNANT NEOPLASM OF LOWER THIRD OF ESOPHAGUS (H): ICD-10-CM

## 2020-01-01 DIAGNOSIS — R93.6 ABNORMAL FINDINGS ON DIAGNOSTIC IMAGING OF LIMBS: ICD-10-CM

## 2020-01-01 DIAGNOSIS — C15.9 ADENOCARCINOMA OF ESOPHAGUS (H): ICD-10-CM

## 2020-01-01 DIAGNOSIS — G20.C PARKINSONISM, UNSPECIFIED PARKINSONISM TYPE (H): Primary | ICD-10-CM

## 2020-01-01 DIAGNOSIS — T50.995A ADVERSE EFFECT OF OTHER DRUGS, MEDICAMENTS AND BIOLOGICAL SUBSTANCES, INITIAL ENCOUNTER: ICD-10-CM

## 2020-01-01 DIAGNOSIS — C15.9 MALIGNANT NEOPLASM OF ESOPHAGUS, UNSPECIFIED LOCATION (H): ICD-10-CM

## 2020-01-01 DIAGNOSIS — D50.0 IRON DEFICIENCY ANEMIA DUE TO CHRONIC BLOOD LOSS: ICD-10-CM

## 2020-01-01 DIAGNOSIS — R74.8 ALKALINE PHOSPHATASE ELEVATION: ICD-10-CM

## 2020-01-01 DIAGNOSIS — F09 COGNITIVE DISORDER: ICD-10-CM

## 2020-01-01 DIAGNOSIS — Z71.89 ACP (ADVANCE CARE PLANNING): ICD-10-CM

## 2020-01-01 DIAGNOSIS — M89.9 BONE LESION: ICD-10-CM

## 2020-01-01 DIAGNOSIS — G20.C PARKINSONISM, UNSPECIFIED PARKINSONISM TYPE (H): ICD-10-CM

## 2020-01-01 DIAGNOSIS — R63.4 WEIGHT LOSS: ICD-10-CM

## 2020-01-01 DIAGNOSIS — R52 PAIN: ICD-10-CM

## 2020-01-01 DIAGNOSIS — T50.905A ADVERSE EFFECT OF DRUG, INITIAL ENCOUNTER: ICD-10-CM

## 2020-01-01 DIAGNOSIS — R93.5 ABNORMAL FINDINGS ON DIAGNOSTIC IMAGING OF OTHER ABDOMINAL REGIONS, INCLUDING RETROPERITONEUM: ICD-10-CM

## 2020-01-01 DIAGNOSIS — I10 ESSENTIAL HYPERTENSION: ICD-10-CM

## 2020-01-01 DIAGNOSIS — C41.9 MALIGNANT NEOPLASM OF BONE AND ARTICULAR CARTILAGE, UNSPECIFIED (H): ICD-10-CM

## 2020-01-01 DIAGNOSIS — N13.30 HYDRONEPHROSIS, UNSPECIFIED HYDRONEPHROSIS TYPE: ICD-10-CM

## 2020-01-01 DIAGNOSIS — N40.0 BENIGN PROSTATIC HYPERPLASIA, UNSPECIFIED WHETHER LOWER URINARY TRACT SYMPTOMS PRESENT: ICD-10-CM

## 2020-01-01 DIAGNOSIS — Z98.890 HISTORY OF VASCULAR ACCESS DEVICE: ICD-10-CM

## 2020-01-01 DIAGNOSIS — Z01.818 PREOPERATIVE EXAMINATION: ICD-10-CM

## 2020-01-01 DIAGNOSIS — R42 ORTHOSTATIC DIZZINESS: ICD-10-CM

## 2020-01-01 DIAGNOSIS — C15.9 ESOPHAGEAL CANCER (H): ICD-10-CM

## 2020-01-01 LAB
ALBUMIN SERPL-MCNC: 3.2 G/DL (ref 3.5–5)
ALBUMIN SERPL-MCNC: 3.6 G/DL (ref 3.5–5)
ALBUMIN SERPL-MCNC: 3.7 G/DL (ref 3.5–5)
ALBUMIN SERPL-MCNC: 4 G/DL (ref 3.5–5)
ALP SERPL-CCNC: 246 U/L (ref 45–120)
ALP SERPL-CCNC: 289 U/L (ref 45–120)
ALP SERPL-CCNC: 57 U/L (ref 45–120)
ALP SERPL-CCNC: 74 U/L (ref 45–120)
ALT SERPL W P-5'-P-CCNC: <9 U/L (ref 0–45)
ANION GAP SERPL CALCULATED.3IONS-SCNC: 10 MMOL/L (ref 5–18)
ANION GAP SERPL CALCULATED.3IONS-SCNC: 11 MMOL/L (ref 5–18)
ANION GAP SERPL CALCULATED.3IONS-SCNC: 14 MMOL/L (ref 5–18)
ANION GAP SERPL CALCULATED.3IONS-SCNC: 6 MMOL/L (ref 5–18)
ANION GAP SERPL CALCULATED.3IONS-SCNC: 8 MMOL/L (ref 5–18)
AST SERPL W P-5'-P-CCNC: 15 U/L (ref 0–40)
AST SERPL W P-5'-P-CCNC: 15 U/L (ref 0–40)
AST SERPL W P-5'-P-CCNC: 16 U/L (ref 0–40)
AST SERPL W P-5'-P-CCNC: 20 U/L (ref 0–40)
ATRIAL RATE - MUSE: 74 BPM
BASOPHILS # BLD AUTO: 0 THOU/UL (ref 0–0.2)
BASOPHILS # BLD AUTO: 0 THOU/UL (ref 0–0.2)
BASOPHILS NFR BLD AUTO: 1 % (ref 0–2)
BASOPHILS NFR BLD AUTO: 1 % (ref 0–2)
BILIRUB SERPL-MCNC: 0.6 MG/DL (ref 0–1)
BILIRUB SERPL-MCNC: 0.6 MG/DL (ref 0–1)
BILIRUB SERPL-MCNC: 0.7 MG/DL (ref 0–1)
BILIRUB SERPL-MCNC: 0.7 MG/DL (ref 0–1)
BUN SERPL-MCNC: 15 MG/DL (ref 8–28)
BUN SERPL-MCNC: 16 MG/DL (ref 8–28)
BUN SERPL-MCNC: 18 MG/DL (ref 8–28)
BUN SERPL-MCNC: 20 MG/DL (ref 8–28)
BUN SERPL-MCNC: 35 MG/DL (ref 8–28)
CALCIUM SERPL-MCNC: 8.3 MG/DL (ref 8.5–10.5)
CALCIUM SERPL-MCNC: 8.3 MG/DL (ref 8.5–10.5)
CALCIUM SERPL-MCNC: 8.6 MG/DL (ref 8.5–10.5)
CALCIUM SERPL-MCNC: 9 MG/DL (ref 8.5–10.5)
CALCIUM SERPL-MCNC: 9.1 MG/DL (ref 8.5–10.5)
CHLORIDE BLD-SCNC: 103 MMOL/L (ref 98–107)
CHLORIDE BLD-SCNC: 104 MMOL/L (ref 98–107)
CHLORIDE BLD-SCNC: 106 MMOL/L (ref 98–107)
CHLORIDE BLD-SCNC: 108 MMOL/L (ref 98–107)
CHLORIDE BLD-SCNC: 108 MMOL/L (ref 98–107)
CO2 SERPL-SCNC: 22 MMOL/L (ref 22–31)
CO2 SERPL-SCNC: 23 MMOL/L (ref 22–31)
CO2 SERPL-SCNC: 25 MMOL/L (ref 22–31)
CO2 SERPL-SCNC: 25 MMOL/L (ref 22–31)
CO2 SERPL-SCNC: 26 MMOL/L (ref 22–31)
CREAT SERPL-MCNC: 0.88 MG/DL (ref 0.7–1.3)
CREAT SERPL-MCNC: 1.04 MG/DL (ref 0.7–1.3)
CREAT SERPL-MCNC: 1.06 MG/DL (ref 0.7–1.3)
CREAT SERPL-MCNC: 1.12 MG/DL (ref 0.7–1.3)
CREAT SERPL-MCNC: 2.75 MG/DL (ref 0.7–1.3)
DIASTOLIC BLOOD PRESSURE - MUSE: NORMAL
EOSINOPHIL # BLD AUTO: 0 THOU/UL (ref 0–0.4)
EOSINOPHIL # BLD AUTO: 0.1 THOU/UL (ref 0–0.4)
EOSINOPHIL NFR BLD AUTO: 1 % (ref 0–6)
EOSINOPHIL NFR BLD AUTO: 1 % (ref 0–6)
ERYTHROCYTE [DISTWIDTH] IN BLOOD BY AUTOMATED COUNT: 13.6 % (ref 11–14.5)
ERYTHROCYTE [DISTWIDTH] IN BLOOD BY AUTOMATED COUNT: 14.5 % (ref 11–14.5)
ERYTHROCYTE [DISTWIDTH] IN BLOOD BY AUTOMATED COUNT: 15 % (ref 11–14.5)
ERYTHROCYTE [DISTWIDTH] IN BLOOD BY AUTOMATED COUNT: 15.8 % (ref 11–14.5)
FERRITIN SERPL-MCNC: 15 NG/ML (ref 27–300)
GFR SERPL CREATININE-BSD FRML MDRD: 22 ML/MIN/1.73M2
GFR SERPL CREATININE-BSD FRML MDRD: >60 ML/MIN/1.73M2
GLUCOSE BLD-MCNC: 115 MG/DL (ref 70–125)
GLUCOSE BLD-MCNC: 79 MG/DL (ref 70–125)
GLUCOSE BLD-MCNC: 84 MG/DL (ref 70–125)
GLUCOSE BLD-MCNC: 87 MG/DL (ref 70–125)
GLUCOSE BLD-MCNC: 92 MG/DL (ref 70–125)
GLUCOSE BLDC GLUCOMTR-MCNC: 90 MG/DL (ref 70–139)
HCT VFR BLD AUTO: 38.6 % (ref 40–54)
HCT VFR BLD AUTO: 40.2 % (ref 40–54)
HCT VFR BLD AUTO: 40.3 % (ref 40–54)
HCT VFR BLD AUTO: 43.2 % (ref 40–54)
HGB BLD-MCNC: 12.5 G/DL (ref 14–18)
HGB BLD-MCNC: 12.9 G/DL (ref 14–18)
HGB BLD-MCNC: 13 G/DL (ref 14–18)
HGB BLD-MCNC: 13.5 G/DL (ref 14–18)
HGB BLD-MCNC: 14.5 G/DL (ref 14–18)
IMM GRANULOCYTES # BLD: 0 THOU/UL
IMM GRANULOCYTES # BLD: 0.2 THOU/UL
IMM GRANULOCYTES NFR BLD: 0 %
IMM GRANULOCYTES NFR BLD: 2 %
INTERPRETATION ECG - MUSE: NORMAL
LYMPHOCYTES # BLD AUTO: 1 THOU/UL (ref 0.8–4.4)
LYMPHOCYTES # BLD AUTO: 1.1 THOU/UL (ref 0.8–4.4)
LYMPHOCYTES NFR BLD AUTO: 15 % (ref 20–40)
LYMPHOCYTES NFR BLD AUTO: 18 % (ref 20–40)
MCH RBC QN AUTO: 28.2 PG (ref 27–34)
MCH RBC QN AUTO: 28.7 PG (ref 27–34)
MCH RBC QN AUTO: 29.1 PG (ref 27–34)
MCH RBC QN AUTO: 29.5 PG (ref 27–34)
MCHC RBC AUTO-ENTMCNC: 31.3 G/DL (ref 32–36)
MCHC RBC AUTO-ENTMCNC: 32 G/DL (ref 32–36)
MCHC RBC AUTO-ENTMCNC: 32.3 G/DL (ref 32–36)
MCHC RBC AUTO-ENTMCNC: 32.4 G/DL (ref 32–36)
MCV RBC AUTO: 88 FL (ref 80–100)
MCV RBC AUTO: 90 FL (ref 80–100)
MCV RBC AUTO: 91 FL (ref 80–100)
MCV RBC AUTO: 92 FL (ref 80–100)
MONOCYTES # BLD AUTO: 0.6 THOU/UL (ref 0–0.9)
MONOCYTES # BLD AUTO: 0.7 THOU/UL (ref 0–0.9)
MONOCYTES NFR BLD AUTO: 11 % (ref 2–10)
MONOCYTES NFR BLD AUTO: 9 % (ref 2–10)
NEUTROPHILS # BLD AUTO: 3.7 THOU/UL (ref 2–7.7)
NEUTROPHILS # BLD AUTO: 5.4 THOU/UL (ref 2–7.7)
NEUTROPHILS NFR BLD AUTO: 69 % (ref 50–70)
NEUTROPHILS NFR BLD AUTO: 72 % (ref 50–70)
P AXIS - MUSE: 62 DEGREES
PLATELET # BLD AUTO: 122 THOU/UL (ref 140–440)
PLATELET # BLD AUTO: 131 THOU/UL (ref 140–440)
PLATELET # BLD AUTO: 137 THOU/UL (ref 140–440)
PLATELET # BLD AUTO: 147 THOU/UL (ref 140–440)
PMV BLD AUTO: 11.6 FL (ref 8.5–12.5)
PMV BLD AUTO: 12 FL (ref 8.5–12.5)
PMV BLD AUTO: 12.7 FL (ref 8.5–12.5)
PMV BLD AUTO: 13 FL (ref 8.5–12.5)
POTASSIUM BLD-SCNC: 4.1 MMOL/L (ref 3.5–5)
POTASSIUM BLD-SCNC: 4.3 MMOL/L (ref 3.5–5)
POTASSIUM BLD-SCNC: 4.3 MMOL/L (ref 3.5–5)
POTASSIUM BLD-SCNC: 4.4 MMOL/L (ref 3.5–5)
POTASSIUM BLD-SCNC: 4.4 MMOL/L (ref 3.5–5)
PR INTERVAL - MUSE: 190 MS
PROT SERPL-MCNC: 6.1 G/DL (ref 6–8)
PROT SERPL-MCNC: 6.6 G/DL (ref 6–8)
PROT SERPL-MCNC: 6.7 G/DL (ref 6–8)
PROT SERPL-MCNC: 7.1 G/DL (ref 6–8)
PSA SERPL-MCNC: 0.6 NG/ML (ref 0–6.5)
QRS DURATION - MUSE: 88 MS
QT - MUSE: 404 MS
QTC - MUSE: 448 MS
R AXIS - MUSE: -48 DEGREES
RBC # BLD AUTO: 4.24 MILL/UL (ref 4.4–6.2)
RBC # BLD AUTO: 4.46 MILL/UL (ref 4.4–6.2)
RBC # BLD AUTO: 4.58 MILL/UL (ref 4.4–6.2)
RBC # BLD AUTO: 4.7 MILL/UL (ref 4.4–6.2)
SODIUM SERPL-SCNC: 136 MMOL/L (ref 136–145)
SODIUM SERPL-SCNC: 138 MMOL/L (ref 136–145)
SODIUM SERPL-SCNC: 141 MMOL/L (ref 136–145)
SODIUM SERPL-SCNC: 141 MMOL/L (ref 136–145)
SODIUM SERPL-SCNC: 143 MMOL/L (ref 136–145)
SYSTOLIC BLOOD PRESSURE - MUSE: NORMAL
T AXIS - MUSE: 43 DEGREES
VENTRICULAR RATE- MUSE: 74 BPM
WBC: 4.8 THOU/UL (ref 4–11)
WBC: 5.3 THOU/UL (ref 4–11)
WBC: 5.4 THOU/UL (ref 4–11)
WBC: 7.5 THOU/UL (ref 4–11)

## 2020-01-01 RX ORDER — BISACODYL 10 MG
SUPPOSITORY, RECTAL RECTAL
COMMUNITY
Start: 2020-01-01 | End: 2020-01-01

## 2020-01-01 RX ORDER — QUETIAPINE FUMARATE 25 MG/1
12.5 TABLET, FILM COATED ORAL
COMMUNITY
Start: 2020-01-01

## 2020-01-01 RX ORDER — ESCITALOPRAM OXALATE 5 MG/1
TABLET ORAL
Qty: 135 TABLET | Refills: 3 | Status: SHIPPED | OUTPATIENT
Start: 2020-01-01

## 2020-01-01 RX ORDER — LANOLIN ALCOHOL/MO/W.PET/CERES
CREAM (GRAM) TOPICAL
COMMUNITY

## 2020-01-01 RX ORDER — ONDANSETRON 4 MG/1
4 TABLET, FILM COATED ORAL
COMMUNITY
End: 2020-01-01

## 2020-01-01 RX ORDER — CARBIDOPA/LEVODOPA 25MG-250MG
TABLET ORAL
Qty: 270 TABLET | Refills: 3 | Status: SHIPPED | OUTPATIENT
Start: 2020-01-01

## 2020-01-01 RX ORDER — OXYCODONE HYDROCHLORIDE 5 MG/1
2.5 TABLET ORAL EVERY 8 HOURS PRN
COMMUNITY
Start: 2020-01-01 | End: 2020-01-01

## 2020-01-01 RX ORDER — ASPIRIN 81 MG/1
81 TABLET, CHEWABLE ORAL
COMMUNITY
Start: 2020-01-01

## 2020-01-01 RX ORDER — ACETAMINOPHEN 500 MG
500 TABLET ORAL 3 TIMES DAILY
COMMUNITY
Start: 2020-01-01

## 2020-01-01 RX ORDER — HYDROXYZINE PAMOATE 25 MG/1
25 CAPSULE ORAL 3 TIMES DAILY
COMMUNITY

## 2020-01-01 RX ORDER — TAMSULOSIN HYDROCHLORIDE 0.4 MG/1
CAPSULE ORAL
Qty: 30 CAPSULE | COMMUNITY
Start: 2020-01-01

## 2020-01-01 RX ORDER — LIDOCAINE/PRILOCAINE 2.5 %-2.5%
CREAM (GRAM) TOPICAL
COMMUNITY
Start: 2019-08-27

## 2020-01-01 ASSESSMENT — MIFFLIN-ST. JEOR
SCORE: 1646.13
SCORE: 1665.18
SCORE: 1681.51
SCORE: 1665.18
SCORE: 1671.53
SCORE: 1672.44
SCORE: 1626.17
SCORE: 1665.18
SCORE: 1665.18
SCORE: 1651.58
SCORE: 1626.17
SCORE: 1678.79
SCORE: 1629.35
SCORE: 1652.94

## 2020-03-27 PROBLEM — R41.0 DELIRIUM: Status: ACTIVE | Noted: 2020-01-01

## 2020-03-27 PROBLEM — R52 PAIN: Status: ACTIVE | Noted: 2020-01-01

## 2020-03-27 PROBLEM — C15.9 MALIGNANT NEOPLASM OF ESOPHAGUS (H): Status: ACTIVE | Noted: 2020-01-01

## 2020-03-27 PROBLEM — T50.995A ADVERSE EFFECT OF OTHER DRUGS, MEDICAMENTS AND BIOLOGICAL SUBSTANCES, INITIAL ENCOUNTER: Status: ACTIVE | Noted: 2019-09-05

## 2020-03-27 NOTE — LETTER
3/27/2020       RE: Avi Villafana  [address] [facility] [address]     Dear Colleague,    Thank you for referring your patient, Avi Villafana, to the [facility] NEUROLOGY at [facility]. Please see a copy of my visit note below.      Date of Visit: March 27, 2020  Name: Avi Villafana  Date of Birth 1940    Phone [phone]    Cristobal Maribel, CNP    [address] [place], [place] [id number] [phone] (Fax)      Assessment:    Esophageal cancer  Just got home from  Hospital yesterday  Weight is 182 lbs. And had 2/26/2020 esophageal cancer surgery and no cancer was seen - went to [facility] on 3/9 and came home yesterday  He does have a feeding tube in and has not used it for some time.  Dr. Josiah Cordova - wanted him to keep the tube in for a while  - 4 weeks from last Thursday. Meanwhile will be getting home physical therapy.     Has some fecal urgency    Parkinson  Using walker at times and sometimes he is walking on his own.   He is stable on his present carbidopa/levodopa 25/250 dose schedule which is 3/day. He is not compazine. He is not taking seroquel on a regular basis and has not taken it since arriving at home. He has had intermittent tremor. He has not had falls.     Plan:  Review need for vistaril/hydroxyzine - whether he should remain on this.     He will need to manage his bowels    Will followup with Dr. Josiah Cordova - a month from last Thursday and feeding tube will probably be removed.     Discussed COVID19.   [email or web address] allows for virtual visits.     Return back in 3-6 months - face to face or video    benfotiamine 300m 2 times per day question  It is a synthetic S-acyl derivative of thiamine (which is vitamin B1)  Discussed b complex vitamins that contain B1, B6 and B12.        I have reviewed the note as documented above.  This accurately captures the substance of my conversation with the patient.  Phone call contact  "time  25 minutes     Medications     7/730am noon 5-7pm       Acetaminophen tylenol 500mg 1  1  1        Bisacodyl dulcolax 10mg suppository Not using          Carbidopa/levodopa parcopa 25/250        Carbidopa/levodopa sinemet 25/250 1 1 1       Diclofenac voltaren 1% topical gel        Escitalopram lexapro 5mg 1           Finasteride Proscar 5mg 1           Hydroxyzine vistaril 25mg  1 1 1        Lidocaine-prilocaine EMLA 2.5-2.5% external cream    For port           Magnesium hydroxide milk of magnesia 400mg/5ml suspension Has not used it          non formulary calm sleep   has not used it for a long time     RARE      Omeprazole prilosec 20mg  1            Ondansestron zofran 4mg Not using       Oxycodone roxicodone 5mg Not using       Potassium citrate Urocit-K 10meq 1080 mg cr tablet 2   2       Prochlorperazine compazine 5mg  Not taking           Quetiapine seroquel 25mg  Has it but not using          Rosuvastatin crestor 20mg     1       Tamsulosin flomax 0.4mg capsule 1                                                                     Gomez Bingham MD      --------------------------------------------------------------------------------------------------------------    Avi Villafana is a 80 year old male who is being evaluated for No chief complaint on file.    via a billable telephone visit.      Charts reviewed  Consult from  Images reviewed    \"After a review of the patient s situation, this visit was changed from an in-person visit to a telephone visit to reduce the risk of COVID-19 exposure.   The patient is being evaluated via a billable telephone visit.\"    The patient has been notified of following:     \"This telephone visit will be conducted via a call between you and your physician/provider. We have found that certain health care needs can be provided without the need for a physical exam.  This service lets us provide the care you need with a short phone conversation.  If a prescription is " "necessary we can send it directly to your pharmacy.  If lab work is needed we can place an order for that and you can then stop by our lab to have the test done at a later time.    If during the course of the call the physician/provider feels a telephone visit is not appropriate, you will not be charged for this service.\"     I have reviewed and updated the patient's Past Medical History, Social History, Family History and Medication List.    Lasts visit details if there was a last visit:     PLAN  Continue the sinemet for now using the 25/250 tablet but if having nausea and unable to keep it down could use parcopa  Instead.     Would avoid prochlorperazine due to its dopamine blocking effects and  Use a medication like zofran instead if there is nausea.      Return back in 3-6 months     No supplements for now      No cognitive testing for now     He will be undergoing chemotherapy, radiation and then surgery for his esophageal cancer.           Medications                                                                                                                                                                                                              14 Review of systems  are negative except for   Patient Active Problem List   Diagnosis     Anxiety     Family history of Parkinson disease     Essential hypertension     Hyperlipidemia     Tremor     Paralysis agitans (H)     ACP (advance care planning)     ASHD (arteriosclerotic heart disease)     Backache     Chest pain     Chronic rhinitis     Deviated septum     Lateral epicondylitis of elbow     GERD (gastroesophageal reflux disease)     Inguinal hernia recurrent unilateral     Leg cramps     Lipid disorder     Orthostatic hypotension     Low back pain     Other diseases of respiratory system, not elsewhere classified     Sacroiliitis (H)     Spasm of back muscles     Spinal headache     Syncope     Parkinson disease (H)     Adenocarcinoma of esophagus " (H)     Adverse drug effect     BPH (benign prostatic hyperplasia)     Iron deficiency anemia due to chronic blood loss     Melena     Orthostatic dizziness     Profound anemia     Encounter for antineoplastic chemotherapy     Parkinsonism (H)        Allergies   Allergen Reactions     Latex Rash     RASH    Other reaction(s): *Unknown  RASH  Added based on information entered during log entry, please review and add reactions, type, and severity as needed       Past Surgical History:   Procedure Laterality Date     cervical spine surgery  1990    c5/6 neck      HERNIA REPAIR Bilateral      LITHOTRIPSY      renal stones     NOSE SURGERY      dr nieto. ?2015     Past Medical History:   Diagnosis Date     Anxiety 8/20/2018     Family history of Parkinson disease 8/20/2018     Hypercholesterolemia 8/20/2018     Hypertension 8/20/2018     Paralysis agitans (H) 8/20/2018     Tremor 8/20/2018     Social History     Socioeconomic History     Marital status:      Spouse name: Not on file     Number of children: Not on file     Years of education: Not on file     Highest education level: Not on file   Occupational History     Not on file   Social Needs     Financial resource strain: Not on file     Food insecurity     Worry: Not on file     Inability: Not on file     Transportation needs     Medical: Not on file     Non-medical: Not on file   Tobacco Use     Smoking status: Former Smoker     Smokeless tobacco: Never Used     Tobacco comment: 1983   Substance and Sexual Activity     Alcohol use: Yes     Comment: 2-3 PER WEEK     Drug use: No     Sexual activity: Not on file   Lifestyle     Physical activity     Days per week: Not on file     Minutes per session: Not on file     Stress: Not on file   Relationships     Social connections     Talks on phone: Not on file     Gets together: Not on file     Attends Jehovah's witness service: Not on file     Active member of club or organization: Not on file     Attends meetings of  clubs or organizations: Not on file     Relationship status: Not on file     Intimate partner violence     Fear of current or ex partner: Not on file     Emotionally abused: Not on file     Physically abused: Not on file     Forced sexual activity: Not on file   Other Topics Concern     Not on file   Social History Narrative    . LIVES IN [place]. 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 [place]     Family History   Problem Relation Age of Onset     Heart Disease Mother      Parkinsonism Father         6 yrs of parkinson     Ovarian Cancer Sister      Diabetes Brother      Other - See Comments Son         greg abreu     Other - See Comments Son         eli and no contact     Current Outpatient Medications   Medication Sig Dispense Refill     carbidopa-levodopa (PARCOPA)  MG ODT Take 1 tablet by mouth 3 times daily 270 tablet 3     carbidopa-levodopa (SINEMET)  MG tablet 25/250 TAB BY MOUTH 3/DAY @ 7am, noon and 5-7pm 270 tablet 3     escitalopram (LEXAPRO) 5 MG tablet 1.5 x 5mg tabs by mouth daily @ 7am  (7.5mg dose/day) 135 tablet 3     finasteride (PROSCAR) 5 MG tablet 5mg tab by mouth daily @ 7am 90 tablet 3     NONFORMULARY Calm powder at night as needed for sleep       omeprazole (PRILOSEC) 20 MG DR capsule Take 20 mg by mouth 2 times daily        potassium citrate (UROCIT-K) 10 MEQ (1080 MG) CR tablet 2 tabs by mouth twice daily @ 7am and 5-7pm  = 4/day 360 tablet 3     prochlorperazine (COMPAZINE) 5 MG tablet TAKE 2 TABLETS(10MG) BY MOUTH EVERY 6 HOURS AS NEEDED FOR BREAKTHROUGH NAUSEA/VOMITING       rosuvastatin (CRESTOR) 20 MG tablet 20mg tab by mouth nightly @ 5-7pm       tamsulosin (FLOMAX) 0.4 MG capsule 0.4mg capsule by mouth daily @ 7/730am 30 capsule        Again, thank you for allowing me to participate in the care of your patient.      Sincerely,    Gomez Bingham MD

## 2020-03-27 NOTE — PATIENT INSTRUCTIONS
Phone [phone]    Mraibel Jain, CNP    [address] [place] [address]    [phone] (Fax)      Assessment:    Esophageal cancer  Just got home from  Hospital yesterday  Weight is 182 lbs. And had 2/26/2020 esophageal cancer surgery and no cancer was seen - went to [facility] on 3/9 and came home yesterday  He does have a feeding tube in and has not used it for some time.  Dr. Josiah Cordova - wanted him to keep the tube in for a while  - 4 weeks from last Thursday. Meanwhile will be getting home physical therapy.     Has some fecal urgency    Parkinson  Using walker at times and sometimes he is walking on his own.   He is stable on his present carbidopa/levodopa 25/250 dose schedule which is 3/day. He is not compazine. He is not taking seroquel on a regular basis and has not taken it since arriving at home. He has had intermittent tremor. He has not had falls.     Plan:  Review need for vistaril/hydroxyzine - whether he should remain on this.     He will need to manage his bowels    Will followup with Dr. Josiah Cordova - a month from last Thursday and feeding tube will probably be removed.     Discussed COVID19.   [email or web address] allows for virtual visits.     Return back in 3-6 months - face to face or video    benfotiamine 300m 2 times per day question  It is a synthetic S-acyl derivative of thiamine (which is vitamin B1)  Discussed b complex vitamins that contain B1, B6 and B12.

## 2020-03-27 NOTE — PROGRESS NOTES
Date of Visit: March 27, 2020  Name: Avi Villafana  Date of Birth 1940    Phone [phone]    Maribel Jain, CNP    [address] [place] [address]    [phone] (Fax)      Assessment:    Esophageal cancer  Just got home from  Hospital yesterday  Weight is 182 lbs. And had 2/26/2020 esophageal cancer surgery and no cancer was seen - went to [facility] on 3/9 and came home yesterday  He does have a feeding tube in and has not used it for some time.  Dr. Josiah Cordova - wanted him to keep the tube in for a while  - 4 weeks from last Thursday. Meanwhile will be getting home physical therapy.     Has some fecal urgency    Parkinson  Using walker at times and sometimes he is walking on his own.   He is stable on his present carbidopa/levodopa 25/250 dose schedule which is 3/day. He is not compazine. He is not taking seroquel on a regular basis and has not taken it since arriving at home. He has had intermittent tremor. He has not had falls.     Plan:  Review need for vistaril/hydroxyzine - whether he should remain on this.     He will need to manage his bowels    Will followup with Dr. Josiah Cordova - a month from last Thursday and feeding tube will probably be removed.     Discussed COVID19.   [email or web address] allows for virtual visits.     Return back in 3-6 months - face to face or video    benfotiamine 300m 2 times per day question  It is a synthetic S-acyl derivative of thiamine (which is vitamin B1)  Discussed b complex vitamins that contain B1, B6 and B12.        I have reviewed the note as documented above.  This accurately captures the substance of my conversation with the patient.  Phone call contact time  25 minutes     Medications     7/730am noon 5-7pm       Acetaminophen tylenol 500mg 1  1  1        Bisacodyl dulcolax 10mg suppository Not using          Carbidopa/levodopa parcopa 25/250        Carbidopa/levodopa sinemet 25/250 1 1 1       Diclofenac voltaren 1% topical  "gel        Escitalopram lexapro 5mg 1           Finasteride Proscar 5mg 1           Hydroxyzine vistaril 25mg  1 1 1        Lidocaine-prilocaine EMLA 2.5-2.5% external cream    For port           Magnesium hydroxide milk of magnesia 400mg/5ml suspension Has not used it          non formulary calm sleep   has not used it for a long time     RARE      Omeprazole prilosec 20mg  1            Ondansestron zofran 4mg Not using       Oxycodone roxicodone 5mg Not using       Potassium citrate Urocit-K 10meq 1080 mg cr tablet 2   2       Prochlorperazine compazine 5mg  Not taking           Quetiapine seroquel 25mg  Has it but not using          Rosuvastatin crestor 20mg     1       Tamsulosin flomax 0.4mg capsule 1                                                                     Gomez Bingham MD      --------------------------------------------------------------------------------------------------------------    Avi CAMI Villafana is a 80 year old male who is being evaluated for No chief complaint on file.    via a billable telephone visit.      Charts reviewed  Consult from  Images reviewed    \"After a review of the patient s situation, this visit was changed from an in-person visit to a telephone visit to reduce the risk of COVID-19 exposure.   The patient is being evaluated via a billable telephone visit.\"    The patient has been notified of following:     \"This telephone visit will be conducted via a call between you and your physician/provider. We have found that certain health care needs can be provided without the need for a physical exam.  This service lets us provide the care you need with a short phone conversation.  If a prescription is necessary we can send it directly to your pharmacy.  If lab work is needed we can place an order for that and you can then stop by our lab to have the test done at a later time.    If during the course of the call the physician/provider feels a telephone visit is not appropriate, you " "will not be charged for this service.\"     I have reviewed and updated the patient's Past Medical History, Social History, Family History and Medication List.    Lasts visit details if there was a last visit:     PLAN  Continue the sinemet for now using the 25/250 tablet but if having nausea and unable to keep it down could use parcopa  Instead.     Would avoid prochlorperazine due to its dopamine blocking effects and  Use a medication like zofran instead if there is nausea.      Return back in 3-6 months     No supplements for now      No cognitive testing for now     He will be undergoing chemotherapy, radiation and then surgery for his esophageal cancer.           Medications                                                                                                                                                                                                              14 Review of systems  are negative except for   Patient Active Problem List   Diagnosis     Anxiety     Family history of Parkinson disease     Essential hypertension     Hyperlipidemia     Tremor     Paralysis agitans (H)     ACP (advance care planning)     ASHD (arteriosclerotic heart disease)     Backache     Chest pain     Chronic rhinitis     Deviated septum     Lateral epicondylitis of elbow     GERD (gastroesophageal reflux disease)     Inguinal hernia recurrent unilateral     Leg cramps     Lipid disorder     Orthostatic hypotension     Low back pain     Other diseases of respiratory system, not elsewhere classified     Sacroiliitis (H)     Spasm of back muscles     Spinal headache     Syncope     Parkinson disease (H)     Adenocarcinoma of esophagus (H)     Adverse drug effect     BPH (benign prostatic hyperplasia)     Iron deficiency anemia due to chronic blood loss     Melena     Orthostatic dizziness     Profound anemia     Encounter for antineoplastic chemotherapy     Parkinsonism (H)        Allergies   Allergen Reactions     " Latex Rash     RASH    Other reaction(s): *Unknown  RASH  Added based on information entered during log entry, please review and add reactions, type, and severity as needed       Past Surgical History:   Procedure Laterality Date     cervical spine surgery  1990    c5/6 neck      HERNIA REPAIR Bilateral      LITHOTRIPSY      renal stones     NOSE SURGERY      dr nieto. ?2015     Past Medical History:   Diagnosis Date     Anxiety 8/20/2018     Family history of Parkinson disease 8/20/2018     Hypercholesterolemia 8/20/2018     Hypertension 8/20/2018     Paralysis agitans (H) 8/20/2018     Tremor 8/20/2018     Social History     Socioeconomic History     Marital status:      Spouse name: Not on file     Number of children: Not on file     Years of education: Not on file     Highest education level: Not on file   Occupational History     Not on file   Social Needs     Financial resource strain: Not on file     Food insecurity     Worry: Not on file     Inability: Not on file     Transportation needs     Medical: Not on file     Non-medical: Not on file   Tobacco Use     Smoking status: Former Smoker     Smokeless tobacco: Never Used     Tobacco comment: 1983   Substance and Sexual Activity     Alcohol use: Yes     Comment: 2-3 PER WEEK     Drug use: No     Sexual activity: Not on file   Lifestyle     Physical activity     Days per week: Not on file     Minutes per session: Not on file     Stress: Not on file   Relationships     Social connections     Talks on phone: Not on file     Gets together: Not on file     Attends [employer] service: Not on file     Active member of club or organization: Not on file     Attends meetings of clubs or organizations: Not on file     Relationship status: Not on file     Intimate partner violence     Fear of current or ex partner: Not on file     Emotionally abused: Not on file     Physically abused: Not on file     Forced sexual activity: Not on file   Other Topics Concern      Not on file   Social History Narrative    . LIVES IN [place]. 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 [place]     Family History   Problem Relation Age of Onset     Heart Disease Mother      Parkinsonism Father         6 yrs of parkinson     Ovarian Cancer Sister      Diabetes Brother      Other - See Comments Johnnie abreu     Other - See Comments Johnnie benitez and no contact     Current Outpatient Medications   Medication Sig Dispense Refill     carbidopa-levodopa (PARCOPA)  MG ODT Take 1 tablet by mouth 3 times daily 270 tablet 3     carbidopa-levodopa (SINEMET)  MG tablet 25/250 TAB BY MOUTH 3/DAY @ 7am, noon and 5-7pm 270 tablet 3     escitalopram (LEXAPRO) 5 MG tablet 1.5 x 5mg tabs by mouth daily @ 7am  (7.5mg dose/day) 135 tablet 3     finasteride (PROSCAR) 5 MG tablet 5mg tab by mouth daily @ 7am 90 tablet 3     NONFORMULARY Calm powder at night as needed for sleep       omeprazole (PRILOSEC) 20 MG DR capsule Take 20 mg by mouth 2 times daily        potassium citrate (UROCIT-K) 10 MEQ (1080 MG) CR tablet 2 tabs by mouth twice daily @ 7am and 5-7pm  = 4/day 360 tablet 3     prochlorperazine (COMPAZINE) 5 MG tablet TAKE 2 TABLETS(10MG) BY MOUTH EVERY 6 HOURS AS NEEDED FOR BREAKTHROUGH NAUSEA/VOMITING       rosuvastatin (CRESTOR) 20 MG tablet 20mg tab by mouth nightly @ 5-7pm       tamsulosin (FLOMAX) 0.4 MG capsule 0.4mg capsule by mouth daily @ 7/730am 30 capsule

## 2020-04-14 NOTE — TELEPHONE ENCOUNTER
Called wife back and let her know that Dr. Bingham did not want the patient to be on supplements for now (per his office visit note). However, the wife said that Dr. Bingham said he can take B1 but he did not give a dosage. She would like to know what milligram to order from [employer].

## 2020-04-14 NOTE — TELEPHONE ENCOUNTER
[facility] Call Center    Phone Message    May a detailed message be left on voicemail: yes     Reason for Call: Medication Question or concern regarding medication   Prescription Clarification  Name of Medication: B12  Prescribing Provider: Otilia   Pharmacy: [facility]   What on the order needs clarification? THe pt's wife wants the pt to Take B1 and wants to know what dose he should take? She is going to order on [employer]. Please call the pt to discuss. Thanks.          Action Taken: Message routed to:  [facility]  neuro    Travel Screening: Not Applicable

## 2020-04-15 NOTE — TELEPHONE ENCOUNTER
Https://medlineplus.gov/druginfo/natural/965.html     As a dietary supplement in adults, 1-2 mg of thiamine per day is commonly used.      Spoke to Louisa and gave her the above information. She also was asking about out patient physical therapy. After the Covid restrictions lift and he is in need, she will call to ask for physical therapy referral orders.

## 2020-05-05 NOTE — TELEPHONE ENCOUNTER
[facility] Call Center    Phone Message    May a detailed message be left on voicemail: yes     Reason for Call: Other: Pt's wife asking to speak to Wendy about Vitamin B1. Louisa said she spoke to Wendy earlier but thinks she wrote something wrong. Please call pt's wife back. Thank you.     Action Taken: Message routed to:  [facility] Neurology    Travel Screening: Not Applicable

## 2020-05-05 NOTE — TELEPHONE ENCOUNTER
Per message from Dr. Bingham it is okay for Avi to take the 100 mg capsules of Vitamin B1.  Louisa stated there is a thiamine supplement on [employer] that says it will help with dizziness. She states Avi gets dizzy once in a while and asks if this will help. I informed her I do not have information on Thiamine helping with dizziness. We discussed encourageing fluids as she reports Avi is not a big fluid drinker.

## 2020-05-05 NOTE — TELEPHONE ENCOUNTER
I informed Louisa that the 1-2 mg Wendy suggested is correct as Avi that we know of is not low on thiamine. She reported the lowest dose she can find is 100 mg. She is wondering if 100 mg is okay for Avi to take.  We discussed  Vitamin B1 is found in many foods including yeast, cereal grains, beans, nuts, and meat.  Louisa reports Avi has esophogeal surgery 2 months ago and had part of his stomach removed so natural sources are more difficult for him to obtain this vitamin.    https://medlineplus.gov/druginfo/natural/965.html#Dosage

## 2020-09-15 NOTE — PROGRESS NOTES
"  VIDEO VISIT - telephone visit today  Called him on his landline    [phone] is his wife's smartphone - apple product  They have a laptop and ipads      Date of Visit: September 18, 2020  Name: Avi Villafana  Date of Birth 1940  [address] [facility] [address]  [phone] (H)  [email or web address]  Has mychart  Inactive proxies  Louisa Jasso spouse  [phone]    Maribel Gruber, CNP    [address] [place] [address]    [phone] (Fax)         Assessment:  (G20) Parkinsonism, unspecified Parkinsonism type (H)  (primary encounter diagnosis)  This is stable. No wearing off. No falls.     Morning memory issues. In particular as it relates to a location; doing word games, etc.     Sleep has been \"wonderful\" - has had some RBD episodes. Sleeps 10-12 hours. Feels rested.   Doing well without the calm powder.     Mood - on escitalopram/lexapro 5mg    Bladder - on finasteride and tamsulosin     Esophageal cancer  9/1 CT scan was a good report  Status post esophagectomy and gastric pull-through. Bibasilar atelectasis with small bilateral pleural effusions right greater than left. No pulmonary nodules or lymphadenopathy. No evidence for metastatic disease in the chest, abdomen or pelvis. 2. Stable hepatic cysts, peripelvic right renal cysts and bilateral cortical renal cysts.    Fecal urgency - resolved; and off most bowel medications except the omeprazole.     Returned on aspirin as of 8/11 from cardiology input.     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       Medications     7/730am noon 5-7pm       Acetaminophen tylenol 500mg 1  1  1        Aspirin 81mg        Carbidopa/levodopa sinemet 25/250 1 1 1       Diclofenac voltaren 1% topical gel             Escitalopram lexapro 5mg 1           Finasteride Proscar 5mg 1           Hydroxyzine vistaril 25mg  1 1 1        Lidocaine-prilocaine EMLA 2.5-2.5% " "external cream For port       non formulary calm sleep  Not using       Omeprazole prilosec 20mg  1            Potassium citrate Urocit-K 10meq 1080 mg cr tablet 2   2       Quetiapine seroquel 25mg  Not using          Rosuvastatin crestor 20mg     1       Tamsulosin flomax 0.4mg capsule     1                                                                  Plan:    Neuropsychological evaluation  With Dr. De Paz.    [phone] is his wife's smartphone - apple product    They have a laptop and ipads but not sure the chrome is set up for this.     Asked about thiamine 100mg per day is the standard dose.     Discussed Benfotiamine 300mg twice daily     reconsult Roxana about memory type of medications for his cognition after the neuropsychological battery is done    Return back in 6 months.     No change in sinemet - it was refilled.       I have reviewed the note as documented above.  This accurately captures the substance of my conversation with the patient.  Patient contact time  25  minutes. Over 50% of this visit was spent in patient care and care coordination.     Time of visit 105pm - 135pm    Gomez Bingham MD      ------------------------------------------------------------------------------------------------------------------------------------------------------------------------    Video-Visit Details    The patient has been notified of following:     \"After a review of the patient s situation, this visit was changed from an in-person visit to a video visit to reduce the risk of COVID-19 exposure.   The patient is being evaluated via a billable video visit.\"    \"This video visit will be conducted via a call between you and your physician/provider. We have found that certain health care needs can be provided without the need for an in-person physical exam.  This service lets us provide the care you need with a video conversation.  If a prescription is necessary we can send it directly to your pharmacy.  If lab " "work is needed we can place an order for that and you can then stop by our lab to have the test done at a later time.    If during the course of the call the physician/provider feels a video visit is not appropriate, you will not be charged for this service.\"     Patient has given verbal consent for Video visit? Yes    Patient would like the video invitation sent by:     Type of service:  Video Visit    Video Start Time:     Video End Time (time video stopped):     Duration:  minutes - see above    Originating Location (pt. Location):     Distant Location (provider location):  [facility]     Mode of Communication:  Video Conference via [employer]ovex (and if not possible then doximity)      Gomez Bingham MD      --------------------------------------------------------------------------------------------------------------    Avi CAMI Villafana is a 80 year old male who is being evaluated via a billable video visit.      Charts reviewed  Consult from  Images reviewed        I have reviewed and updated the patient's Past Medical History, Social History, Family History and Medication List.    ALLERGIES  Latex    Lasts visit details if there was a last visit:         Medications                                                                                                                                                                                                              14 Review of systems  are negative except for   Patient Active Problem List   Diagnosis     Anxiety     Family history of Parkinson disease     Essential hypertension     Hyperlipidemia     Tremor     Paralysis agitans (H)     ACP (advance care planning)     ASHD (arteriosclerotic heart disease)     Backache     Chest pain     Chronic rhinitis     Deviated septum     Lateral epicondylitis of elbow     GERD (gastroesophageal reflux disease)     Inguinal hernia recurrent unilateral     Leg cramps     Lipid disorder     Orthostatic hypotension     " Low back pain     Other diseases of respiratory system, not elsewhere classified     Sacroiliitis (H)     Spasm of back muscles     Spinal headache     Syncope     Parkinson disease (H)     Adenocarcinoma of esophagus (H)     Adverse drug effect     BPH (benign prostatic hyperplasia)     Iron deficiency anemia due to chronic blood loss     Melena     Orthostatic dizziness     Profound anemia     Encounter for antineoplastic chemotherapy     Parkinsonism (H)     Delirium     Malignant neoplasm of esophagus (H)     Pain     Adverse effect of other drugs, medicaments and biological substances, initial encounter        Allergies   Allergen Reactions     Latex Rash     RASH    Other reaction(s): *Unknown  RASH  Added based on information entered during log entry, please review and add reactions, type, and severity as needed    Added based on information entered during case entry, please review and add reactions, type, and severity as needed       Past Surgical History:   Procedure Laterality Date     cervical spine surgery  1990    c5/6 neck      HERNIA REPAIR Bilateral      LITHOTRIPSY      renal stones     NOSE SURGERY      dr nieto. ?2015     Past Medical History:   Diagnosis Date     Anxiety 8/20/2018     Family history of Parkinson disease 8/20/2018     Hypercholesterolemia 8/20/2018     Hypertension 8/20/2018     Paralysis agitans (H) 8/20/2018     Tremor 8/20/2018     Social History     Socioeconomic History     Marital status:      Spouse name: Not on file     Number of children: Not on file     Years of education: Not on file     Highest education level: Not on file   Occupational History     Not on file   Social Needs     Financial resource strain: Not on file     Food insecurity     Worry: Not on file     Inability: Not on file     Transportation needs     Medical: Not on file     Non-medical: Not on file   Tobacco Use     Smoking status: Former Smoker     Smokeless tobacco: Never Used     Tobacco  comment: 1983   Substance and Sexual Activity     Alcohol use: Yes     Comment: 2-3 PER WEEK     Drug use: No     Sexual activity: Not on file   Lifestyle     Physical activity     Days per week: Not on file     Minutes per session: Not on file     Stress: Not on file   Relationships     Social connections     Talks on phone: Not on file     Gets together: Not on file     Attends Latter day service: Not on file     Active member of club or organization: Not on file     Attends meetings of clubs or organizations: Not on file     Relationship status: Not on file     Intimate partner violence     Fear of current or ex partner: Not on file     Emotionally abused: Not on file     Physically abused: Not on file     Forced sexual activity: Not on file   Other Topics Concern     Not on file   Social History Narrative    . LIVES IN [place]. 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       carbidopa-levodopa (SINEMET)  MG tablet 25/250 TAB BY MOUTH 3/DAY @ 7am, noon and 5-7pm 270 tablet 3     diclofenac (VOLTAREN) 1 % topical gel Apply 2 g topically 3 times daily       escitalopram (LEXAPRO) 5 MG tablet 5mg tabs by mouth daily @ 7am 135 tablet 3     finasteride (PROSCAR) 5 MG tablet 5mg tab by mouth daily @ 7am 90 tablet 3     hydrOXYzine (VISTARIL) 25 MG capsule Take 25 mg by mouth 3 times daily         lidocaine-prilocaine (EMLA) 2.5-2.5 % external cream APPLY TOPICALLY TO PORT 30-60 MIN BEFORE ACCESSING PORT - as needed       magnesium hydroxide (MILK OF MAGNESIA) 400 MG/5ML suspension Take 30 mLs by mouth daily as needed       NONFORMULARY Calm powder at night as needed for sleep       omeprazole (PRILOSEC) 20 MG DR capsule 20mg capsule by mouth daily before coffee or food around 8 am       ondansetron (ZOFRAN) 4 MG tablet Take 4 mg by mouth       oxyCODONE (ROXICODONE) 5 MG tablet 2.5 mg by Enteral route every 8 hours as needed       potassium citrate (UROCIT-K) 10 MEQ (1080 MG) CR tablet 2 tabs by mouth twice daily @ 7am and 5-7pm  = 4/day 360 tablet 3     prochlorperazine (COMPAZINE) 5 MG tablet TAKE 2 TABLETS(10MG) BY MOUTH EVERY 6 HOURS AS NEEDED FOR BREAKTHROUGH NAUSEA/VOMITING       QUEtiapine (SEROQUEL) 25 MG tablet Take 12.5 mg by mouth nightly as needed       rosuvastatin (CRESTOR) 20 MG tablet 20mg tab by mouth nightly @ 5-7pm       tamsulosin (FLOMAX) 0.4 MG capsule 0.4mg capsule by mouth daily @ 7/730am 30 capsule

## 2020-09-18 NOTE — PATIENT INSTRUCTIONS
"  (G20) Parkinsonism, unspecified Parkinsonism type (H)  (primary encounter diagnosis)  This is stable. No wearing off. No falls.     Morning memory issues. In particular as it relates to a location; doing word games, etc.     Sleep has been \"wonderful\" - has had some RBD episodes. Sleeps 10-12 hours. Feels rested.   Doing well without the calm powder.     Mood - on escitalopram/lexapro 5mg    Bladder - on finasteride and tamsulosin     Esophageal cancer  9/1 CT scan was a good report  Status post esophagectomy and gastric pull-through. Bibasilar atelectasis with small bilateral pleural effusions right greater than left. No pulmonary nodules or lymphadenopathy. No evidence for metastatic disease in the chest, abdomen or pelvis. 2. Stable hepatic cysts, peripelvic right renal cysts and bilateral cortical renal cysts.    Fecal urgency - resolved; and off most bowel medications except the omeprazole.     Returned on aspirin as of 8/11 from cardiology input.     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       Medications     7/730am noon 5-7pm       Acetaminophen tylenol 500mg 1  1  1        Aspirin 81mg        Carbidopa/levodopa sinemet 25/250 1 1 1       Diclofenac voltaren 1% topical gel             Escitalopram lexapro 5mg 1           Finasteride Proscar 5mg 1           Hydroxyzine vistaril 25mg  1 1 1        Lidocaine-prilocaine EMLA 2.5-2.5% external cream For port       non formulary calm sleep  Not using       Omeprazole prilosec 20mg  1            Potassium citrate Urocit-K 10meq 1080 mg cr tablet 2   2       Quetiapine seroquel 25mg  Not using          Rosuvastatin crestor 20mg     1       Tamsulosin flomax 0.4mg capsule     1                                                                  Plan:    Neuropsychological evaluation  With Dr. De Paz.    [phone] is his wife's smartphone - apple product    They have a laptop and ipads but not sure the chrome is set up for this.     Asked " about thiamine 100mg per day is the standard dose.     Discussed Benfotiamine 300mg twice daily     reconsult Roxana about memory type of medications for his cognition after the neuropsychological battery is done    Return back in 6 months.     No change in sinemet - it was refilled.

## 2020-09-18 NOTE — LETTER
"9/18/2020       RE: Avi Villafana  [address] [facility] [address]     Dear Colleague,    Thank you for referring your patient, Avi Villafana, to the [facility] NEUROLOGY at [facility]. Please see a copy of my visit note below.      VIDEO VISIT - telephone visit today  Called him on his landline    [phone] is his wife's smartphone - apple product  They have a laptop and ipads      Date of Visit: September 18, 2020  Name: Avi Villafana  Date of Birth 1940  [address] [facility] [address]  [phone] (H)  [email or web address]  Has mychart  Inactive proxies  Louisa Jasso spouse  [phone]    Maribel Gruber, CNP    [address] [place] [address]    [phone] (Fax)         Assessment:  (G20) Parkinsonism, unspecified Parkinsonism type (H)  (primary encounter diagnosis)  This is stable. No wearing off. No falls.     Morning memory issues. In particular as it relates to a location; doing word games, etc.     Sleep has been \"wonderful\" - has had some RBD episodes. Sleeps 10-12 hours. Feels rested.   Doing well without the calm powder.     Mood - on escitalopram/lexapro 5mg    Bladder - on finasteride and tamsulosin     Esophageal cancer  9/1 CT scan was a good report  Status post esophagectomy and gastric pull-through. Bibasilar atelectasis with small bilateral pleural effusions right greater than left. No pulmonary nodules or lymphadenopathy. No evidence for metastatic disease in the chest, abdomen or pelvis. 2. Stable hepatic cysts, peripelvic right renal cysts and bilateral cortical renal cysts.    Fecal urgency - resolved; and off most bowel medications except the omeprazole.     Returned on aspirin as of 8/11 from cardiology input.     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       Medications     7/730am noon 5-7pm       Acetaminophen tylenol 500mg 1  " "1  1        Aspirin 81mg        Carbidopa/levodopa sinemet 25/250 1 1 1       Diclofenac voltaren 1% topical gel             Escitalopram lexapro 5mg 1           Finasteride Proscar 5mg 1           Hydroxyzine vistaril 25mg  1 1 1        Lidocaine-prilocaine EMLA 2.5-2.5% external cream For port       non formulary calm sleep  Not using       Omeprazole prilosec 20mg  1            Potassium citrate Urocit-K 10meq 1080 mg cr tablet 2   2       Quetiapine seroquel 25mg  Not using          Rosuvastatin crestor 20mg     1       Tamsulosin flomax 0.4mg capsule     1                                                                  Plan:    Neuropsychological evaluation  With Dr. De Paz.    [phone] is his wife's smartphone - apple product    They have a laptop and ipads but not sure the chrome is set up for this.     Asked about thiamine 100mg per day is the standard dose.     Discussed Benfotiamine 300mg twice daily     reconsult Roxana about memory type of medications for his cognition after the neuropsychological battery is done    Return back in 6 months.     No change in sinemet - it was refilled.       I have reviewed the note as documented above.  This accurately captures the substance of my conversation with the patient.  Patient contact time  25  minutes. Over 50% of this visit was spent in patient care and care coordination.     Time of visit 105pm - 135pm    Gomez Bingham MD      ------------------------------------------------------------------------------------------------------------------------------------------------------------------------    Video-Visit Details    The patient has been notified of following:     \"After a review of the patient s situation, this visit was changed from an in-person visit to a video visit to reduce the risk of COVID-19 exposure.   The patient is being evaluated via a billable video visit.\"    \"This video visit will be conducted via a call between you and your " "physician/provider. We have found that certain health care needs can be provided without the need for an in-person physical exam.  This service lets us provide the care you need with a video conversation.  If a prescription is necessary we can send it directly to your pharmacy.  If lab work is needed we can place an order for that and you can then stop by our lab to have the test done at a later time.    If during the course of the call the physician/provider feels a video visit is not appropriate, you will not be charged for this service.\"     Patient has given verbal consent for Video visit? Yes    Patient would like the video invitation sent by:     Type of service:  Video Visit    Video Start Time:     Video End Time (time video stopped):     Duration:  minutes - see above    Originating Location (pt. Location):     Distant Location (provider location):  [facility] NEUROLOGY     Mode of Communication:  Video Conference via Foradian (and if not possible then doximity)      Gomez Bingham MD      --------------------------------------------------------------------------------------------------------------    Avi ALMANZA Ledy is a 80 year old male who is being evaluated via a billable video visit.      Charts reviewed  Consult from  Images reviewed        I have reviewed and updated the patient's Past Medical History, Social History, Family History and Medication List.    ALLERGIES  Latex    Lasts visit details if there was a last visit:         Medications                                                                                                                                                                                                              14 Review of systems  are negative except for   Patient Active Problem List   Diagnosis     Anxiety     Family history of Parkinson disease     Essential hypertension     Hyperlipidemia     Tremor     Paralysis agwalkers (H)     ACP (advance care planning)     ASHD " (arteriosclerotic heart disease)     Backache     Chest pain     Chronic rhinitis     Deviated septum     Lateral epicondylitis of elbow     GERD (gastroesophageal reflux disease)     Inguinal hernia recurrent unilateral     Leg cramps     Lipid disorder     Orthostatic hypotension     Low back pain     Other diseases of respiratory system, not elsewhere classified     Sacroiliitis (H)     Spasm of back muscles     Spinal headache     Syncope     Parkinson disease (H)     Adenocarcinoma of esophagus (H)     Adverse drug effect     BPH (benign prostatic hyperplasia)     Iron deficiency anemia due to chronic blood loss     Melena     Orthostatic dizziness     Profound anemia     Encounter for antineoplastic chemotherapy     Parkinsonism (H)     Delirium     Malignant neoplasm of esophagus (H)     Pain     Adverse effect of other drugs, medicaments and biological substances, initial encounter        Allergies   Allergen Reactions     Latex Rash     RASH    Other reaction(s): *Unknown  RASH  Added based on information entered during log entry, please review and add reactions, type, and severity as needed    Added based on information entered during case entry, please review and add reactions, type, and severity as needed       Past Surgical History:   Procedure Laterality Date     cervical spine surgery  1990    c5/6 neck      HERNIA REPAIR Bilateral      LITHOTRIPSY      renal stones     NOSE SURGERY      dr nieto. ?2015     Past Medical History:   Diagnosis Date     Anxiety 8/20/2018     Family history of Parkinson disease 8/20/2018     Hypercholesterolemia 8/20/2018     Hypertension 8/20/2018     Paralysis agitans (H) 8/20/2018     Tremor 8/20/2018     Social History     Socioeconomic History     Marital status:      Spouse name: Not on file     Number of children: Not on file     Years of education: Not on file     Highest education level: Not on file   Occupational History     Not on file   Social Needs      Financial resource strain: Not on file     Food insecurity     Worry: Not on file     Inability: Not on file     Transportation needs     Medical: Not on file     Non-medical: Not on file   Tobacco Use     Smoking status: Former Smoker     Smokeless tobacco: Never Used     Tobacco comment: 1983   Substance and Sexual Activity     Alcohol use: Yes     Comment: 2-3 PER WEEK     Drug use: No     Sexual activity: Not on file   Lifestyle     Physical activity     Days per week: Not on file     Minutes per session: Not on file     Stress: Not on file   Relationships     Social connections     Talks on phone: Not on file     Gets together: Not on file     Attends Buddhist service: Not on file     Active member of club or organization: Not on file     Attends meetings of clubs or organizations: Not on file     Relationship status: Not on file     Intimate partner violence     Fear of current or ex partner: Not on file     Emotionally abused: Not on file     Physically abused: Not on file     Forced sexual activity: Not on file   Other Topics Concern     Not on file   Social History Narrative    . LIVES IN [place]. 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       carbidopa-levodopa (SINEMET)  MG tablet 25/250 TAB BY MOUTH 3/DAY @ 7am, noon and 5-7pm 270 tablet 3     diclofenac (VOLTAREN) 1 %  topical gel Apply 2 g topically 3 times daily       escitalopram (LEXAPRO) 5 MG tablet 5mg tabs by mouth daily @ 7am 135 tablet 3     finasteride (PROSCAR) 5 MG tablet 5mg tab by mouth daily @ 7am 90 tablet 3     hydrOXYzine (VISTARIL) 25 MG capsule Take 25 mg by mouth 3 times daily        lidocaine-prilocaine (EMLA) 2.5-2.5 % external cream APPLY TOPICALLY TO PORT 30-60 MIN BEFORE ACCESSING PORT - as needed       magnesium hydroxide (MILK OF MAGNESIA) 400 MG/5ML suspension Take 30 mLs by mouth daily as needed       NONFORMULARY Calm powder at night as needed for sleep       omeprazole (PRILOSEC) 20 MG DR capsule 20mg capsule by mouth daily before coffee or food around 8 am       ondansetron (ZOFRAN) 4 MG tablet Take 4 mg by mouth       oxyCODONE (ROXICODONE) 5 MG tablet 2.5 mg by Enteral route every 8 hours as needed       potassium citrate (UROCIT-K) 10 MEQ (1080 MG) CR tablet 2 tabs by mouth twice daily @ 7am and 5-7pm  = 4/day 360 tablet 3     prochlorperazine (COMPAZINE) 5 MG tablet TAKE 2 TABLETS(10MG) BY MOUTH EVERY 6 HOURS AS NEEDED FOR BREAKTHROUGH NAUSEA/VOMITING       QUEtiapine (SEROQUEL) 25 MG tablet Take 12.5 mg by mouth nightly as needed       rosuvastatin (CRESTOR) 20 MG tablet 20mg tab by mouth nightly @ 5-7pm       tamsulosin (FLOMAX) 0.4 MG capsule 0.4mg capsule by mouth daily @ 7/730am 30 capsule        Again, thank you for allowing me to participate in the care of your patient.      Sincerely,    Gomez Bingham MD

## 2021-01-01 ENCOUNTER — COMMUNICATION - HEALTHEAST (OUTPATIENT)
Dept: ONCOLOGY | Facility: HOSPITAL | Age: 81
End: 2021-01-01

## 2021-01-11 ENCOUNTER — COMMUNICATION - HEALTHEAST (OUTPATIENT)
Dept: FAMILY MEDICINE | Facility: CLINIC | Age: 81
End: 2021-01-11

## 2021-01-15 ENCOUNTER — HEALTH MAINTENANCE LETTER (OUTPATIENT)
Age: 81
End: 2021-01-15

## 2021-01-18 ENCOUNTER — RECORDS - HEALTHEAST (OUTPATIENT)
Dept: ADMINISTRATIVE | Facility: OTHER | Age: 81
End: 2021-01-18

## 2021-02-17 ENCOUNTER — AMBULATORY - HEALTHEAST (OUTPATIENT)
Dept: PHARMACY | Facility: HOSPITAL | Age: 81
End: 2021-02-17

## 2021-05-26 ENCOUNTER — RECORDS - HEALTHEAST (OUTPATIENT)
Dept: ADMINISTRATIVE | Facility: CLINIC | Age: 81
End: 2021-05-26

## 2021-05-26 VITALS
OXYGEN SATURATION: 94 % | SYSTOLIC BLOOD PRESSURE: 131 MMHG | DIASTOLIC BLOOD PRESSURE: 61 MMHG | HEART RATE: 69 BPM | RESPIRATION RATE: 16 BRPM

## 2021-05-26 VITALS
OXYGEN SATURATION: 97 % | DIASTOLIC BLOOD PRESSURE: 56 MMHG | HEART RATE: 68 BPM | SYSTOLIC BLOOD PRESSURE: 115 MMHG | TEMPERATURE: 97.5 F

## 2021-05-26 VITALS — OXYGEN SATURATION: 95 % | SYSTOLIC BLOOD PRESSURE: 97 MMHG | HEART RATE: 67 BPM | DIASTOLIC BLOOD PRESSURE: 57 MMHG

## 2021-05-26 VITALS
HEART RATE: 78 BPM | TEMPERATURE: 97.7 F | DIASTOLIC BLOOD PRESSURE: 65 MMHG | SYSTOLIC BLOOD PRESSURE: 109 MMHG | OXYGEN SATURATION: 95 %

## 2021-05-26 VITALS — DIASTOLIC BLOOD PRESSURE: 53 MMHG | SYSTOLIC BLOOD PRESSURE: 80 MMHG | HEART RATE: 88 BPM

## 2021-05-27 ENCOUNTER — RECORDS - HEALTHEAST (OUTPATIENT)
Dept: ADMINISTRATIVE | Facility: CLINIC | Age: 81
End: 2021-05-27

## 2021-05-27 VITALS
SYSTOLIC BLOOD PRESSURE: 95 MMHG | HEART RATE: 73 BPM | OXYGEN SATURATION: 96 % | TEMPERATURE: 98.3 F | DIASTOLIC BLOOD PRESSURE: 57 MMHG

## 2021-05-27 VITALS
TEMPERATURE: 97.6 F | DIASTOLIC BLOOD PRESSURE: 88 MMHG | OXYGEN SATURATION: 96 % | HEART RATE: 76 BPM | SYSTOLIC BLOOD PRESSURE: 159 MMHG

## 2021-05-27 VITALS
RESPIRATION RATE: 16 BRPM | OXYGEN SATURATION: 95 % | HEART RATE: 64 BPM | DIASTOLIC BLOOD PRESSURE: 63 MMHG | TEMPERATURE: 98.7 F | SYSTOLIC BLOOD PRESSURE: 114 MMHG

## 2021-05-28 ENCOUNTER — RECORDS - HEALTHEAST (OUTPATIENT)
Dept: ADMINISTRATIVE | Facility: CLINIC | Age: 81
End: 2021-05-28

## 2021-05-29 ENCOUNTER — RECORDS - HEALTHEAST (OUTPATIENT)
Dept: ADMINISTRATIVE | Facility: CLINIC | Age: 81
End: 2021-05-29

## 2021-05-30 ENCOUNTER — RECORDS - HEALTHEAST (OUTPATIENT)
Dept: ADMINISTRATIVE | Facility: CLINIC | Age: 81
End: 2021-05-30

## 2021-05-31 NOTE — ANESTHESIA PREPROCEDURE EVALUATION
Anesthesia Evaluation      Patient summary reviewed   No history of anesthetic complications     Airway   Mallampati: II  Neck ROM: full   Pulmonary - negative ROS and normal exam    breath sounds clear to auscultation                         Cardiovascular - negative ROS  Exercise tolerance: > or = 4 METS  (+) hypertension, ,     (-) murmur  Rhythm: regular  Rate: normal,    no murmur      Neuro/Psych    (+) Parkinson's disease,     Endo/Other - negative ROS      GI/Hepatic/Renal    (+) GERD, esophageal disease,       Other findings: Labs 8/12/19:  WBC 5.4, Hgb 8.8, Plt 236  Na 141, K 4.1, BUN 11, Cr 1.08, Glu 136      Dental - normal exam                        Anesthesia Plan  Planned anesthetic: MAC  Plan for propofol gtt w/ ketamine / fentanyl PRN for pain.  Discussed potential need to convert to GA w/ ETT vs LMA if not tolerating.  Explained that it is possible to remember certain aspects of the OR experience during MAC dependent on the depth of sedation and patient understands this.  Decadron (4 mg) and zofran for PONV ppx.    Given diagnosis of parkinson's disease will avoid haloperidol, metoclopramide, phenothiazines (prochlorperazine and chlorpromazine) as these agents can precipitate symptoms.    ASA 3     Anesthetic plan and risks discussed with: patient and spouse  Anesthesia plan special considerations: antiemetics,   Post-op plan: routine recovery

## 2021-05-31 NOTE — TELEPHONE ENCOUNTER
Placed call to Avi to schedule appointment with Medical Oncology. Message left that I had received a referral from Dr. France  to schedule Avi with an oncologist.   Asked Avi to call me back so I can assist with scheduling an appointment.   Contact information left during call.

## 2021-05-31 NOTE — CONSULTS
[facility] Hematology and Oncology Consult Note    Patient: Avi Villafana  MRN: [id number]  Date of Service: 08/16/2019      Reason for Visit    Chief Complaint   Patient presents with     HE Cancer     GE junction adenocarcinoma       Referred by Dr. Tad Tovar regarding adenocarcinoma of esophagus    Assessment/Plan    ECOG Performance   ECOG Performance Status: 1  Distress Assessment  Distress Assessment Score: Unable to rate    #. 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 Intraop frozen for the lymph node from GH ligament was positive for malignancy.   Additional pathology test for MMR, HER-2 and PDL-1 sent due to clinically locally advanced stage.    Discussed about initial staging scan with PET/CT scan. Discussed about treatment for locally advanced stage with preoperative chemoradiation (either neoadjvuant or definitive).  Treatment goal is for cure and discuss about high risk of recurrence.  The rational for preoperative chemoradiation is to improve long-term local regional recurrence, resectability and overall survival.  He will meet with Dr. Alexandra next week to discuss about surgical evaluation, but made him aware that chemoradiation will be the first step to take.  I discussed about chemotherapy with carboplatin/paclitaxel weekly (5 doses) with radiation. Reviewed side effects and gave him drug information. Discussed about port placement and referred to IR for port placement, unless Dr. Alexandra would be able to place a port for him. He is referred to radiation oncology.   Nutrition support will be very important with combined chemorad. He currently has a good appetite and able to eat food very well. Discussed about potential need of feeding tube. Referral to nutritionist has been made.     I will follow-up with labs, chemotherapy infusion appointment to coordinate with radiation  therapy schedule.     #. 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 Received 1 unit of PRBCs and most recent Hgb 8.8 g/dl. Iron studies suggest iron deficiency. With anticipation of chemoradiation treatment, I recommended to start IV Feraheme 2 doses, 1 week apart.  Not advisable for oral iron therapy due to GE junction malignant ulcer, unpredictable absorption.  I also discussed about potential need for blood transfusion with chemotherapy and radiation.    #.  Intermittent dizziness, possibly related to anemia.   No clear indication for blood transfusion at this point.  IV iron replacement as above.    #. Parkinson disease.    Mainly tremors but well controlled with sinemet.  He does boxing and plays golf and doing very well.    Problem List    1. Encounter for antineoplastic chemotherapy  IR Port Placement 5+ Years    Education (Chemo Class)    Oncology Staff Appointment    prochlorperazine (COMPAZINE) 10 MG tablet    Ambulatory referral to Nutrition Services    Ambulatory referral to Radiation Oncology    CC OFFICE VISIT LONG    Infusion Appointment   2. Adenocarcinoma of esophagus (H)  IR Port Placement 5+ Years    Education (Chemo Class)    Oncology Staff Appointment    prochlorperazine (COMPAZINE) 10 MG tablet    Ambulatory referral to Nutrition Services    Ambulatory referral to Radiation Oncology    CC OFFICE VISIT LONG    Infusion Appointment    NM PET CT Skull to Mid Thigh    Pathology Additional Testing   3. Malignant neoplasm of lower third of esophagus (H)   NM PET CT Skull to Mid Thigh    Pathology Additional Testing   4. Iron deficiency anemia due to chronic blood loss     5. Adverse effect of drug, initial encounter       ______________________________________________________________________________    Staging History    Cancer Staging  No matching staging information was found for the  patient.    History    Mr.Bruce CAMI Villafana is a very pleasant 79 y.o. male accompanied by his wife, Louisa.  He presented to the hospital about 2 weeks ago with lightheadedness, black stools and found hemoglobin of 7.7 g/dL (baseline hemoglobin around 16 g/dL).  He underwent EGD by Dr. France and it showed a 3 cm mass at the posterior cardia, extending up distal esophagus.  Biopsy confirmed moderately differentiated adenocarcinoma.  CT scan of the chest abdomen pelvis without oral with IV contrast showed a focal wall thickening of the distal esophagus, a few 8 mm and smaller lymph nodes in the fat near the distal esophagus and GE junction.  No evidence of distant metastases.  He underwent EUS today and esophagus showed hypoechoic mass at the GE junction, 14 mm in maximal thickness, T2 (into but not through the muscularis per prior).  Celiac axis was normal.  Multiple benign cysts in the liver.  An oval 8 x 6 mm lymph node seen in the GH ligament and FNA was performed.  Intra-Op evaluation showed a malignant lymph node.  There are 3 additional small lymph nodes were seen in the deep to the tumor which were not assessable for biopsy.    Avi reported that he is still feeling not right in his ear and seems like he is sitting in a different chamber.  Not particularly lightheaded or dizzy.  Denies chest pain or shortness of breath.  He denies poor appetite or weight loss.  He reported that his appetite is been really good and still very good.  Recently he has noted anxiety symptoms.    He has Parkinson disease diagnosed about a year ago  for evaluation of tremors.  His tremors it was mainly in his hands and currently well controlled with Sinemet.  Normal gait.  Denies any chronic pain or sensory changes in his lower extremity or upper extremities.    He is retired.  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  He is .  They have 2 children.  He quit smoking in 1983.  He drinks  alcohol about 3 times per week.    Sister had ovarian cancer at age 54.    Past History    Past Medical History:   Diagnosis Date     Adenocarcinoma of esophagus (H) 2019     Family history of myocardial infarction     mom 50's     GERD (gastroesophageal reflux disease)      High cholesterol      History of transfusion      Parkinson disease (H)     Family History   Problem Relation Age of Onset     Heart disease Mother       Past Surgical History:   Procedure Laterality Date     CARDIAC CATHETERIZATION       ESOPHAGOGASTRODUODENOSCOPY N/A 2019    Procedure: ENDOSCOPIC ULTRASOUND, FINE NEEDLE ASPIRATION;  Surgeon: Colt France MD;  Location: [facility] OR;  Service: Gastroenterology     HERNIA REPAIR       KIDNEY STONE SURGERY N/A 2013?     neck fusion N/A      [place] ESOPHAGOGASTRODUODENOSCOPY TRANSORAL DIAGNOSTIC N/A 2019    Procedure: ESOPHAGOGASTRODUODENOSCOPY (EGD) with biopsies;  Surgeon: Colt France MD;  Location: [facility];  Service: Gastroenterology    Social History     Socioeconomic History     Marital status:      Spouse name: Louisa     Number of children: Not on file     Years of education: Not on file     Highest education level: Not on file   Occupational History     Occupation: research      Employer: [employer]   Social Needs     Financial resource strain: Not on file     Food insecurity:     Worry: Not on file     Inability: Not on file     Transportation needs:     Medical: Not on file     Non-medical: Not on file   Tobacco Use     Smoking status: Former Smoker     Last attempt to quit: 1983     Years since quittin.4     Smokeless tobacco: Never Used   Substance and Sexual Activity     Alcohol use: Yes     Alcohol/week: 1.2 - 1.8 oz     Types: 2 - 3 Glasses of wine per week     Drug use: No     Sexual activity: Yes     Partners: Female   Lifestyle     Physical activity:     Days per week: Not on file     Minutes per session: Not  on file     Stress: Not on file   Relationships     Social connections:     Talks on phone: Not on file     Gets together: Not on file     Attends Religion service: Not on file     Active member of club or organization: Not on file     Attends meetings of clubs or organizations: Not on file     Relationship status: Not on file     Intimate partner violence:     Fear of current or ex partner: Not on file     Emotionally abused: Not on file     Physically abused: Not on file     Forced sexual activity: Not on file   Other Topics Concern     Not on file   Social History Narrative     30 + yrs/ he and wife have grown kids previous marriages    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    Plays golf         Allergies    Allergies   Allergen Reactions     Latex Rash     Latex      Added based on information entered during log entry, please review and add reactions, type, and severity as needed       Review of Systems    General  General (WDL): All general elements are within defined limits  ENT  ENT (WDL): All ENT elements are within defined limits  Vertigo (Dizziness): Yes, Recent (Less than 3 months)  Glasses or Contacts: Yes - Chronic (Greater than 3 months)  Respiratory  Respiratory (WDL): All respiratory elements are within defined limits  Cardiovascular  Cardiovascular (WDL): All cardiovascular elements are within defined limits  Endocrine  Endocrine (WDL): All endocrine elements are within defined limits  Gastrointestinal  Gastrointestinal (WDL): All gastrointestinal elements are within defined limits  Musculoskeletal  Musculoskeletal (WDL): All musculoskeletal elements are within defined limits  Neurological  Neurological (WDL): Exceptions to WDL  Vertigo (Dizziness): Yes, Recent (Less than 3 months)  Dominant Hand: Left  Psychological/Emotional  Psychological/Emotional (WDL): Exceptions to WDL  Anxiety: Yes - Recent (Less than 3 months)  Hematological/Lymphatic  Hematological/Lymphatic  "(WDL): All hematological/lymphatic elements are within defined limits  Dermatological  Dermatologic (WDL): All dermatological elements are within defined limits  Genitourinary/Reproductive  Genitourinary/Reproductive (WDL): All genitourinary/reproductive elements are within defined limits  Reproductive (Females only)     Pain  Currently in Pain: No/denies    Physical Exam    Recent Vitals 8/16/2019   Height 6' 1\"   Weight 204 lbs   BSA (m2) 2.18 m2   BP 91/50   Pulse 66   Temp -   Temp src -   SpO2 98   Some recent data might be hidden     General: alert, awake, not in acute distress  HEENT: Head: Normal, normocephalic, atraumatic.  Eye: Normal external eye, conjunctiva, lids cornea, MINH.  Ears:  Non-tender.  Nose: Normal external nose, mucus membranes and septum.  Pharynx: Dental Hygiene adequate. Normal buccal mucosa. Normal pharynx.  Neck / Thyroid: Supple, no masses, nodes, nodules or enlargement.  Lymphatics: No abnormally enlarged lymph nodes.  Chest: Normal chest wall and respirations. Clear to auscultation.  Heart: S1 S2 RRR, no murmur.   Abdomen: abdomen is soft without significant tenderness, masses, organomegaly or guarding  Extremities: normal strength, tone, and muscle mass  Skin: normal. no rash or abnormalities  CNS: non focal.    Lab Results    Recent Results (from the past 168 hour(s))   HM1 (CBC with Diff)   Result Value Ref Range    WBC 5.4 4.0 - 11.0 thou/uL    RBC 3.11 (L) 4.40 - 6.20 mill/uL    Hemoglobin 8.8 (L) 14.0 - 18.0 g/dL    Hematocrit 27.2 (L) 40.0 - 54.0 %    MCV 87 80 - 100 fL    MCH 28.3 27.0 - 34.0 pg    MCHC 32.4 32.0 - 36.0 g/dL    RDW 15.7 (H) 11.0 - 14.5 %    Platelets 236 140 - 440 thou/uL    MPV 8.8 7.0 - 10.0 fL    Neutrophils % 57 50 - 70 %    Lymphocytes % 33 20 - 40 %    Monocytes % 7 2 - 10 %    Eosinophils % 3 0 - 6 %    Basophils % 0 0 - 2 %    Neutrophils Absolute 3.1 2.0 - 7.7 thou/uL    Lymphocytes Absolute 1.8 0.8 - 4.4 thou/uL    Monocytes Absolute 0.4 0.0 - 0.9 " thou/uL    Eosinophils Absolute 0.2 0.0 - 0.4 thou/uL    Basophils Absolute 0.0 0.0 - 0.2 thou/uL       Imaging Results    Ct Chest Abdomen Pelvis Without Oral With Iv Contrast    Result Date: 8/7/2019  EXAM: CT CHEST ABDOMEN PELVIS WO ORAL W IV CONTRAST LOCATION: [facility] DATE/TIME: 8/7/2019 2:26 PM INDICATION: Neoplasm: gastric ge junction mass, neoplasm COMPARISON: None. TECHNIQUE: Helical thin-section CT scan of the chest, abdomen, and pelvis was performed following injection of IV contrast. Multiplanar reformats were obtained. Dose reduction techniques were used. CONTRAST: Iohexol (Omni) 100 mL FINDINGS: CHEST: Focal wall thickening of the distal esophagus near the gastroesophageal junction measuring up to about 2 cm thickness correlates to known primary neoplasm. A few lymph nodes in the adjacent fat at the level of the mass and gastroesophageal junction range in size from 5-8 mm (e.G. images 85, 87, and 100 of axial series 2). No enlarged mediastinal or hilar lymph nodes. 4 mm subpleural nodule of the right lower lobe near the oblique fissure of doubtful significance (image 103 of axial series 3). Tiny 2 mm subpleural nodule of the right upper lobe is probably calcified and benign (image 66 of series 3). Slight atelectasis at the lung bases. Mild paraseptal and centrilobular emphysema predominantly affecting the upper lobes. Mild peripheral reticular interstitial thickening of both lungs has a chronic inflammatory appearance. Multivessel coronary artery calcification. Normal heart size.  ABDOMEN: Multiple small sharply defined rounded hypodense lesions of the liver consistent with cysts. Bilateral renal cysts. Gallbladder, bile ducts, spleen, pancreas, and adrenal glands are negative. PELVIS: Small intestine, colon, rectum, and urinary bladder are negative. No free fluid or lymphadenopathy in the pelvis. MUSCULOSKELETAL: Degenerative changes of the spine. No concerning lytic or blastic bone lesions.      CONCLUSION: 1.  Focal wall thickening of the distal esophagus near the gastroesophageal junction correlates to known primary neoplasm. 2.  A few 8 mm and smaller lymph nodes in the fat near the distal esophagus and gastroesophageal junction range are indeterminate for disease involvement. 3.  No evidence of distant metastatic disease. 4.  Emphysema. 5.  Coronary artery disease.    TT: 80 minutes: time consisted of medical record review, examination of patient, completing documentation and counseling time on the results, goals of treatment, further steps to complete staging, treatment options, management of anemia and follow-up.    Signed by: Spencer Cox MD

## 2021-05-31 NOTE — ANESTHESIA POSTPROCEDURE EVALUATION
Patient: Avi Villafana  ENDOSCOPIC ULTRASOUND, FINE NEEDLE ASPIRATION  Anesthesia type: MAC    Patient location: Phase II Recovery  Last vitals:   Vitals Value Taken Time   /59 8/16/2019 12:30 PM   Temp 36.3  C (97.4  F) 8/16/2019 11:26 AM   Pulse 69 8/16/2019 12:43 PM   Resp 16 8/16/2019 11:26 AM   SpO2 97 % 8/16/2019 12:43 PM   Vitals shown include unvalidated device data.  Post vital signs: stable  Level of consciousness: awake and responds to simple questions  Post-anesthesia pain: pain controlled  Post-anesthesia nausea and vomiting: no  Pulmonary: unassisted, return to baseline  Cardiovascular: stable and blood pressure at baseline  Hydration: adequate  Anesthetic events: no    QCDR Measures:  ASA# 11 - [place]-op Cardiac Arrest: ASA11B - Patient did NOT experience unanticipated cardiac arrest  ASA# 12 - [place]-op Mortality Rate: ASA12B - Patient did NOT die  ASA# 13 - PACU Re-Intubation Rate: ASA13B - Patient did NOT require a new airway mgmt  ASA# 10 - Composite Anes Safety: ASA10A - No serious adverse event    Additional Notes:

## 2021-05-31 NOTE — ANESTHESIA CARE TRANSFER NOTE
Last vitals:   Vitals:    08/26/19 1412   BP: 106/64   Pulse: 68   Resp: 16   Temp:    SpO2: 98%     Patient's level of consciousness is awake  Spontaneous respirations: yes  Maintains airway independently: yes  Dentition unchanged: yes  Oropharynx: oropharynx clear of all foreign objects    QCDR Measures:  ASA# 20 - Surgical Safety Checklist: WHO surgical safety checklist completed prior to induction    PQRS# 430 - Adult PONV Prevention: 4558F - Pt received => 2 anti-emetic agents (different classes) preop & intraop  ASA# 8 - Peds PONV Prevention: NA - Not pediatric patient, not GA or 2 or more risk factors NOT present  PQRS# 424 - [place]-op Temp Management: 4559F - At least one body temp DOCUMENTED => 35.5C or 95.9F within required timeframe  PQRS# 426 - PACU Transfer Protocol: - Transfer of care checklist used  ASA# 14 - Acute Post-op Pain: ASA14B - Patient did NOT experience pain >= 7 out of 10

## 2021-05-31 NOTE — PROGRESS NOTES
The patient attended chemotherapy class today.  The patient was educated on:  -[facility] Educational Classes and Support Groups  -Chemotherapy: what it is and how it works  -Described a typical day at the treatment center and what the patient can expect  -Discussed port access and functions of the port   -Chemotherapy side effects and appropriate management of these side effects. SE discussed included and not limited to: Fatigue, nausea and vomiting, constipation, diarrhea, mucositis, alopecia, peripheral neuropathy, pain, anemia, thrombocytopenia, and neutropenia.    -Reasons to call the physician, including, fever>100.5, shaking chills, unusual bleeding or bruising, shortness of breath, vomiting>12hours, nausea >24 hours, unable to eat/drink >24 hours, painful urination, blood in urine or stool, soreness at the IV site, and painful mouth sores.  The  triage phone number was given to the patient and the patient was encouraged to call with any questions.  The patient was given a tour of the infusion center.  All questions were addressed to the best of my abilities and the patient was encouraged to call with any questions.  Time Spent:     60min

## 2021-05-31 NOTE — PROGRESS NOTES
[facility] Radiation Oncology Consult Note    Patient: Avi Villafana  MRN: [id number]  Date of Service: 08/23/2019    Assessment / Impression     1. 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 Concurrent radiation with carboplatin/paclitaxel.    1. We discussed risks and benefits, in detail, of radiation therapy including side effects as described below. We also discussed diet modification and the possibility of a feeding tube. The patient voices understanding of the information discussed and wishes to proceed forward with treatment. We will obtain CT sim to begin radiation planning. All questions and concerns addressed.      Face to face time  60  minutes with > 75% spent on consultation, education and coordination of care.  Intent of Therapy: Curative     Side effects that may occur during or within weeks after Radiation Therapy      Fatigue and general weakness    Loss of hair on chest    Pain in the irradiated area, with difficulty swallowing may require a feeding tube    Nausea, bloating, diarrhea    Decrease in appetite    Fatigue.     Side effects that may occur months or years after Radiation Therapy      Development of another tumor or cancer    Thickening, telangiectasias (development of spider like blood vessels in the skin) and ulceration of the skin of the face and neck    Fibrosis (scar tissue), affecting esophageal motility, and lumen requiring dilitation  or permanent feeding tube    Brain inflammation or necrosis that may cause various gastrointestinal symptoms    Poor healing after a trauma or surgery in the irradiated area    Nerve damage resulting in loss of strength and sensation      The risks, benefits and alternatives to radiation therapy were outlined with the patient. All questions were answered and a consent was signed.    Subjective:      HPI: Avi Villafana is a 79 y.o. male with lower esophageal adenocarcinoma with involvement of paraesophageal and gastrohepatic LN.     The patient originally presented to the ED on 8/8/019 with light headedness and one episode of black stools. During his evaluation his Hgb returned at 8 which was down from baseline 16,  was given 1 unit, and discharged. An EGD was performed which showed a 3 cm mass a the posterior cardia, extending up the distal esophagus, biopsy was performed which returned with moderately differentiated adenocarcinoma. Further evaluation with CT CAP w oral/IV contrast showed focal wall thickening of the distal GE junction and a few 8mm and smaller LNs in the far near the distal esophagus and GE junction range, otherwise no evidence of distant metastatic disease.     An EUS FNA was performed on 8/16/2019, an  gastrohepatic LN, 8 x 6 mm, returned with metastatic adenocarcinoma. There were three smaller LNs deep to the tumor which were inaccessible to biopsy. PET CT on 8/21/2019 consistent with distal esophageal carcinoma with paraesophageal and gastrohepatic ligament LN metastases above the level of the celiac axis.     The patient presents today to discuss radiation therapy. No difficulties with swallowing or slow food transit down esophagus. Weight has been stable.     Prior Radiation: No  Concurrent Chemotherapy: Yes    Current Outpatient Medications   Medication Sig Dispense Refill     carbidopa-levodopa (SINEMET)  mg per tablet Take 1 tablet by mouth 3 (three) times a day. Take at 7am, noon,  and 5-7pm       escitalopram oxalate (LEXAPRO) 5 MG tablet Take 7.5 mg by mouth daily.        finasteride (PROSCAR) 5 mg tablet Take 5 mg by mouth daily.       omeprazole (PRILOSEC OTC) 20 MG tablet Take 1 tablet (20 mg total) by mouth 2 (two) times a day before meals. 90 tablet 0     potassium citrate (UROCIT-K) 10 mEq (1,080 mg) SR tablet Take 20 mEq by mouth 2 (two) times a day.       prochlorperazine (COMPAZINE) 5 MG tablet TAKE 2 TABLETS(10MG) BY MOUTH EVERY 6 HOURS AS NEEDED FOR BREAKTHROUGH NAUSEA/VOMITING 100 tablet 1     rosuvastatin (CRESTOR) 20 MG tablet Take 20 mg by mouth at bedtime.        tamsulosin (FLOMAX) 0.4 mg Cp24 Take 0.4 mg by mouth Daily after breakfast.              No current facility-administered medications for this visit.      Past Medical History:   Diagnosis Date     Adenocarcinoma of esophagus (H) 8/9/2019     Family history of myocardial infarction     mom 50's     GERD (gastroesophageal reflux disease)      High cholesterol      History of transfusion      Parkinson disease (H)      Past Surgical History:   Procedure Laterality Date     CARDIAC CATHETERIZATION       ESOPHAGOGASTRODUODENOSCOPY N/A 8/16/2019    Procedure: ENDOSCOPIC ULTRASOUND, FINE NEEDLE ASPIRATION;  Surgeon: Colt France MD;  Location: [facility];  Service: Gastroenterology     HERNIA REPAIR       KIDNEY STONE SURGERY N/A June 2013?     neck fusion N/A      [place] ESOPHAGOGASTRODUODENOSCOPY TRANSORAL DIAGNOSTIC N/A 8/7/2019    Procedure: ESOPHAGOGASTRODUODENOSCOPY (EGD) with biopsies;  Surgeon: Colt France MD;  Location: [facility];  Service: Gastroenterology     Latex and Latex  Family History   Problem Relation Age of Onset     Heart disease Mother      Social History     Socioeconomic History     Marital status:      Spouse name: Louisa     Number of children: Not on file     Years of education: Not on file     Highest education level: Not on file   Occupational History      Occupation: research      Employer: [employer]   Social Needs     Financial resource strain: Not on file     Food insecurity:     Worry: Not on file     Inability: Not on file     Transportation needs:     Medical: Not on file     Non-medical: Not on file   Tobacco Use     Smoking status: Former Smoker     Last attempt to quit: 1983     Years since quittin.5     Smokeless tobacco: Never Used   Substance and Sexual Activity     Alcohol use: Yes     Alcohol/week: 1.2 - 1.8 oz     Types: 2 - 3 Glasses of wine per week     Drug use: No     Sexual activity: Yes     Partners: Female   Lifestyle     Physical activity:     Days per week: Not on file     Minutes per session: Not on file     Stress: Not on file   Relationships     Social connections:     Talks on phone: Not on file     Gets together: Not on file     Attends Anabaptist service: Not on file     Active member of club or organization: Not on file     Attends meetings of clubs or organizations: Not on file     Relationship status: Not on file     Intimate partner violence:     Fear of current or ex partner: Not on file     Emotionally abused: Not on file     Physically abused: Not on file     Forced sexual activity: Not on file   Other Topics Concern     Not on file   Social History Narrative     30 + yrs/ he and wife have grown kids previous marriages    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    Plays golf         Review of Systems:        General  Constitutional (WDL): All constitutional elements are within defined limits  EENT  Eye Disorder (WDL): All eye disorder elements are within defined limits  Ear Disorder (WDL): All ear disorder elements are within defined limits  Respiratory       Respiratory (WDL): Exceptions to WDL  Cough: Mild symptoms, nonprescription intervention indicated  Cardiovascular  Cardiovascular (WDL): Exceptions to WDL(hypotensive, asymptomatic)  Endocrine     Gastrointestinal  Gastrointestinal  (WDL): All gastrointestinal elements are within defined limits  Musculoskeletal  Musculoskeletal and Connetive Tissue Disorders (WDL): All Musculoskeletal and Connetive Tissue Disorder elements are within defined limits  Integumentary               Integumentary (WDL): All integumentary elements are within defined limits  Neurological  Neurosensory (WDL): Exceptions to WDL  Dizziness: Mild unsteadiness or sensation of movement  Psychological/Emotional   Patient Coping: Accepting;Open/discussion  Hematological/Lymphatic  Lymph (WDL): All lymph disorder elements are within defined limits  Dermatologic     Genitourinary/Reproductive  Genitourinary (WDL): All genitourinary elements are within defined limits  Reproductive     Pain              Currently in Pain: No/denies   AUA Assessment                    Accompanied by  Accompanied by: Family Member      Objective:     Physical Exam    Vitals:    08/23/19 0934   BP: (!) 85/52   Pulse: 80   Temp: 98.2  F (36.8  C)   TempSrc: Oral   SpO2: 97%       GENERAL: no acute distress. Cooperative in conversation.   HEENT: pupils are equal, round and reactive. Oromucosa moist.  RESP: lungs are clear bilaterally per auscultation. Regular respiratory rate. No wheezes or rhonchi.  CV: Regular, rate and rhythm.   ABD: soft, nontender..   MUSCULOSKELETAL: no palpable points of tenderness   NEURO: non focal. Alert and oriented x3.   PSYCH: within normal limits. No depression or anxiety.  SKIN: warm dry intact   LYMPH: no cervical, supraclavicular lymphadenopathy  EXTREMITIES: no edema          Recent Labs:   Recent Results (from the past 168 hour(s))   POCT Glucose   Result Value Ref Range    Glucose 95 70 - 139 mg/dL       Imaging: Imaging results 6 weeks:Nm Pet Ct Skull To Mid Thigh    Result Date: 8/21/2019  EXAM: NM PET CT SKULL TO MID THIGH LOCATION: [facility] DATE/TIME: 8/21/2019 11:30 AM INDICATION: Initial treatment strategy for staging malignant neoplasm of lower  third of esophagus COMPARISON: CT 08/07/2019 TECHNIQUE: Serum glucose level 98 mg/dL. One hour post intravenous administration of 10.9 mCi F-18 FDG, PET imaging was performed from the skull base to the mid thighs utilizing attenuation correction with concurrent axial CT and PET/CT image fusion. Dose reduction techniques were used. FINDINGS: Extremely FDG avid (SUVmax 26.0) annular wall thickening in the distal esophagus extends for about 4 cm to the gastroesophageal junction. FDG avid paraesophageal and gastrohepatic ligament lymphadenopathy. The gastrohepatic ligament node, as an  example, measures 1.2 x 0.7 cm (SUVmax 3.2). FDG activity in the remainder of the body is normal. Moderate senescent intracranial changes. Moderate calcified atherosclerosis, including coronary disease and/or stents. Hypoattenuation of blood pool relative to myocardium, suggesting anemia. Tiny nonobstructing bilateral kidney stones. Bilateral kidney cysts. Liver cysts. Mildly enlarged prostate. Pelvic phleboliths. Moderate hypertrophic change throughout the spine.     CONCLUSION: Findings consistent with distal esophageal carcinoma with paraesophageal and gastrohepatic ligament lymph node metastases above the level of the celiac axis. No evidence of distant metastases.    Ct Chest Abdomen Pelvis Without Oral With Iv Contrast    Result Date: 8/7/2019  EXAM: CT CHEST ABDOMEN PELVIS WO ORAL W IV CONTRAST LOCATION: [facility] DATE/TIME: 8/7/2019 2:26 PM INDICATION: Neoplasm: gastric ge junction mass, neoplasm COMPARISON: None. TECHNIQUE: Helical thin-section CT scan of the chest, abdomen, and pelvis was performed following injection of IV contrast. Multiplanar reformats were obtained. Dose reduction techniques were used. CONTRAST: Iohexol (Omni) 100 mL FINDINGS: CHEST: Focal wall thickening of the distal esophagus near the gastroesophageal junction measuring up to about 2 cm thickness correlates to known primary neoplasm. A few lymph  nodes in the adjacent fat at the level of the mass and gastroesophageal junction range in size from 5-8 mm (e.G. images 85, 87, and 100 of axial series 2). No enlarged mediastinal or hilar lymph nodes. 4 mm subpleural nodule of the right lower lobe near the oblique fissure of doubtful significance (image 103 of axial series 3). Tiny 2 mm subpleural nodule of the right upper lobe is probably calcified and benign (image 66 of series 3). Slight atelectasis at the lung bases. Mild paraseptal and centrilobular emphysema predominantly affecting the upper lobes. Mild peripheral reticular interstitial thickening of both lungs has a chronic inflammatory appearance. Multivessel coronary artery calcification. Normal heart size.  ABDOMEN: Multiple small sharply defined rounded hypodense lesions of the liver consistent with cysts. Bilateral renal cysts. Gallbladder, bile ducts, spleen, pancreas, and adrenal glands are negative. PELVIS: Small intestine, colon, rectum, and urinary bladder are negative. No free fluid or lymphadenopathy in the pelvis. MUSCULOSKELETAL: Degenerative changes of the spine. No concerning lytic or blastic bone lesions.     CONCLUSION: 1.  Focal wall thickening of the distal esophagus near the gastroesophageal junction correlates to known primary neoplasm. 2.  A few 8 mm and smaller lymph nodes in the fat near the distal esophagus and gastroesophageal junction range are indeterminate for disease involvement. 3.  No evidence of distant metastatic disease. 4.  Emphysema. 5.  Coronary artery disease.      Pathology:   Results for orders placed or performed during the hospital encounter of 08/06/19 (from the past 8760 hour(s))   Surgical Pathology Exam   Result Value    Case Report      Surgical Pathology                                Case: [id number]                                   Authorizing Provider:  Colt France, Collected:           08/07/2019 Barry AMAYA                                                                            Ordering Location:     [facility] GI Received:            08/07/2019 9539              Pathologist:           Lisandro Chaidez MD                                                        Specimen:    Gastric Esophageal Junction, Biopsy, biopsy of mass                                        Addendum      ADDENDUM COMMENT:  Immunohistochemical stains have been performed on paraffin sections of gastroesophageal junction adenocarcinoma in order to characterize mismatch repair proteins. The results are as follows:         Immunohistochemistry (IHC) Testing for Mismatch Repair (MMR) Proteins:   MLH1:     - Intact nuclear expression     MSH2:     - Intact nuclear expression     MSH6:     - Intact nuclear expression     PMS2:     - Intact nuclear expression     Background non-neoplastic tissue/internal control with intact nuclear expression      IHC Interpretation:   - No loss of nuclear expression of MMR proteins: low probability of microsatellite instability-high (MSI-H)    ADDITIONAL CPT CODES:  88341 X4     An immunohistochemical stain for HER2 has been performed on paraffin section of gastroesophageal junction adenocarcinoma. The result is as follows:       HER2/tomi: Negative (0-1+ membrane staining)    All controls stain appropriately.       Note - HER-2/tomi Immunoperoxidase  Stain:  This stain was done using the following criteria:    Specimen fixative: Formalin-fixed paraffin-embedded sections    Detection system: Biotin-free multimer-based technology detection system (SCHAD)    Retrieval method: CC1 pretreatment; a chuy-based buffer with a slightly basic PH used at an elevated     temperature (95 plus or minus 5 degrees C)    Clone:  4B5, Rabbit monoclonal ([employer])    Scoring method: IHC 3+ - Positive (Circumferential membrane staining that is complete, intense, and     within greater than 10% of tumor cells)       IHC  2+ - Equivocal (Circumferential Membrane staining that is incomplete and/or     weak/moderate and within greater than 10% of tumor cells or complete and     circumferential membrane staining that is intense and less than or equal to 10% of tumor     cells)       IHC1+ - Negative (Incomplete membrane staining that is faint/barely perceptible     and within greater than 10% of tumor cells       IHC 0 - Negative (No staining is  Observed or Membrane staining that is incomplete and     is faint/barely perceptible and within less than or equal to 10% of tumor cells)    REFERENCES:  1) Mayuri BACA et al: Recommendations for Human Epidermal Growth Factor Receptor 2 Testing in Breast Cancer.      American Society of Clinical Oncology/College of American Pathologists Clinical Practice Guideline Update.      Arch Pathol Lab Med. 2014;138: 241-256     * HER-2/tomi stain performed using the [place] [employer]way's FDA approved methodology.         Final Diagnosis      MASS, GASTROESOPHAGEAL JUNCTION, BIOPSY:     - INVASIVE, MODERATELY-DIFFERENTIATED ADENOCARCINOMA     - PLEASE SEE COMMENT    MCSS    Comment      In order to characterize the cellular elements, immunohistochemical stains are performed. The results are as follows:    - Cytokeratin-7: Positive    - Cytokeratin-20: Negative    - CDX-2: Positive    - p63: Negative      In addition, an Alcian blue stain has been performed, and it is focally positive.    All controls stain appropriately.    The morphologic findings observed in this specimen are most consistent with adenocarcinoma arising in the upper gastrointestinal tract, in this case, from gastroesophageal mucosa.    An immunohistochemical stain for HER2/tomi will be performed. The result will be reported in an addendum.    Microscopic Description      Microscopic examination performed, substantiating the above diagnosis.    Clinical Information Pre-op Diagnosis: Melena [K92.1]    Gross Description      The specimen is  "received in formalin with the patient's name, labeled \"gastroesophageal junction, biopsy of mass,\" and consists of multiple pink to focally red mucosal tissues that vary from 1 to 4 mm in greatest dimension. TE-1C [facility]:gayle    Charges      CPT: 59253, 24594, 88341 x3, 96959, 04859  ICD-10: C15.9    Result Flag Malignant (!)     Comment:   SPECIMEN PROCESSING:    All histology slide preparation and stains; and cytology slide preparation, staining, and cytotechnologist screening done at [facility] are performed at [facility], [address] [place] [address], [facility], with final interpretation, frozen section analysis, and cytology adequacy assessment at indicated laboratory.             I, Gena Cowan MD personally performed the services described in this documentation, as scribed by Parish Gupta in my presence, and it is both accurate and complete.    Signed by: Gena Cowan MD, MPH     "

## 2021-05-31 NOTE — TELEPHONE ENCOUNTER
I would agree, looking at the notes, that he should go back to the ED.  They will better be able to help him there.    TS

## 2021-05-31 NOTE — TELEPHONE ENCOUNTER
Call placed to Avi to check in and see how appointment with Dr Alexandra. Avi and Louisa state appointment went well and that Dr Alexandra's office will be calling them today to set up Port placement. I encouraged them to let Dr Alexandra's office that Avi is tentatively scheduled to start chemo on 8/28/2019. Many things can affect the start date such as when Port is placed, what happen's during consult with Dr Cowan and yuly or not Dr Cox wishes to start chemo before radiation or wait until radiation plan is ready and start chemo and radiation at same time. Louisa and Avi verbalize understanding.  They have a chemo class set up for today and see Dr Cowan Friday 8/23. They return to clinic on Wednesday August 28th to see Dr Cox. Louisa verbalizes understanding of all appointments that are set up. At present they have no additional questions.     Louisa states that they have an appointment set up with Avi's  Parkinson's disease doctor at the  and an appointment with ENT coming up to take care of excess ear wax. Encouraged to keep other maintenance health care appointment's.     Denies need for any resources at present, encouraged to call with any questions or concerns.

## 2021-05-31 NOTE — ANESTHESIA POSTPROCEDURE EVALUATION
Patient: Avi Villafana  PLACEMENT PORTACATH  Anesthesia type: MAC    Patient location: Phase II Recovery  Last vitals:   Vitals Value Taken Time   /59 8/26/2019  3:15 PM   Temp 36.6  C (97.9  F) 8/26/2019  2:12 PM   Pulse 61 8/26/2019  3:28 PM   Resp 16 8/26/2019  3:00 PM   SpO2 99 % 8/26/2019  3:28 PM   Vitals shown include unvalidated device data.  Post vital signs: stable  Level of consciousness: awake, alert and oriented  Post-anesthesia pain: pain controlled  Post-anesthesia nausea and vomiting: no  Pulmonary: unassisted, return to baseline  Cardiovascular: stable and blood pressure at baseline  Hydration: adequate  Anesthetic events: no    QCDR Measures:  ASA# 11 - Olinda-op Cardiac Arrest: ASA11B - Patient did NOT experience unanticipated cardiac arrest  ASA# 12 - Olinda-op Mortality Rate: ASA12B - Patient did NOT die  ASA# 13 - PACU Re-Intubation Rate: NA - No ETT / LMA used for case  ASA# 10 - Composite Anes Safety: ASA10A - No serious adverse event    Additional Notes:

## 2021-05-31 NOTE — TELEPHONE ENCOUNTER
Dr. Cox, please send RX for ondansetron.     Patient saw Dr. Bingham, Neurology for patient dx of Parkinson's. This is from his note:  Would avoid prochlorperazine due to its dopamine blocking effects and Use a medication like zofran instead if there is nausea.   Spouse is wanting RX sent to [facility] on [address].   Deja Jones RN

## 2021-05-31 NOTE — ANESTHESIA CARE TRANSFER NOTE
Last vitals:   Vitals:    08/16/19 1126   BP: 107/57   Pulse: (!) 57   Resp: 16   Temp: 36.3  C (97.4  F)   SpO2: 100%     Patient's level of consciousness is drowsy  Spontaneous respirations: yes  Maintains airway independently: yes  Dentition unchanged: yes  Oropharynx: oropharynx clear of all foreign objects    QCDR Measures:  ASA# 20 - Surgical Safety Checklist: WHO surgical safety checklist completed prior to induction    PQRS# 430 - Adult PONV Prevention: 4558F - Pt received => 2 anti-emetic agents (different classes) preop & intraop  ASA# 8 - Peds PONV Prevention: NA - Not pediatric patient, not GA or 2 or more risk factors NOT present  PQRS# 424 - Olinda-op Temp Management: 4559F - At least one body temp DOCUMENTED => 35.5C or 95.9F within required timeframe  PQRS# 426 - PACU Transfer Protocol: - Transfer of care checklist used  ASA# 14 - Acute Post-op Pain: ASA14B - Patient did NOT experience pain >= 7 out of 10

## 2021-05-31 NOTE — PROGRESS NOTES
Pt here ambulatory with his wife, for consultation with Dr. Chapo YATES. He feels well, and has virtually no symptoms of his esophageal cancer. He does have Parkinson's and is set to meet with his doctor for that next week. Plan for concurrent chemo/rad. I reviewed the process for RT with patient/spouse including consult, simulation CT, daily RT, weekly RO visits, common s/e of fatigue/nausea/anorexia. Dr. Cox had placed an order for Nutrition and will have Naheed LÓPEZ set that up today with Kasey BROWN. I did review the importance of nutrition and hydration in going through treatment and pt verbalized his understanding. Both he and his wife are retired, have good support. They'd like to have treatment at .

## 2021-05-31 NOTE — TELEPHONE ENCOUNTER
Louisa calls in today to let me know that Dr Cordova will be placing port on Monday 8/26. They have a consult today with Dr Cowan, and will get more information on chemotherapy and radiation and when they begin today.

## 2021-05-31 NOTE — PROGRESS NOTES
TCM DISCHARGE FOLLOW UP CALL    Discharge Date:  8/7/2019  Reason for hospital stay (discharge diagnosis)::  Acute blood loss anemia, GE junction mass  Are you feeling better, the same or worse since your discharge?:  Patient is feeling better (Pt waas in ED today after brief episode of dizziness. states he feels fine now. )  Do you feel like you have a plan in the event of a health emergency?: Yes (wife)    As part of your discharge plan, were  home care services ordered for you?: No    Did you receive any new medications, or was there a change to your medications?: Yes    Are you taking those medications, or do you have any established regiment?:  Pt is taking omeprazole two times a day as prescribed. He has stopped ASA.  Do you have any follow up visits scheduled with your PCP or Specialist?:  Yes, with PCP  (RN) Is PCP appt scheduled soon enough (within 14 days of discharge date)?: Yes (8/9)    RN NOTES::  Encouraged fluids. Asked pt to start monitoring BP. Reviewed orthostatic precautions.

## 2021-05-31 NOTE — ANESTHESIA PREPROCEDURE EVALUATION
Anesthesia Evaluation      Patient summary reviewed   No history of anesthetic complications     Airway   Mallampati: II  Neck ROM: full   Pulmonary - negative ROS and normal exam                          Cardiovascular - normal exam  Exercise tolerance: > or = 4 METS  (+) hypertension, , hypercholesterolemia,     ECG reviewed        Neuro/Psych    (+) Parkinson's disease,     Endo/Other - negative ROS      GI/Hepatic/Renal    (+) GERD well controlled, esophageal disease (cancer),            Dental - normal exam   (+) bridge                       Anesthesia Plan  Planned anesthetic: MAC    ASA 2     Anesthetic plan and risks discussed with: patient    Post-op plan: routine recovery

## 2021-05-31 NOTE — TELEPHONE ENCOUNTER
Per call to patient and his wife, they are headed to the ER now and have an appt with Dr. Tovar for tomorrow at 2:40pm. Lab work will be done at ER or tomorrow at visit. Lab appt is canceled.

## 2021-05-31 NOTE — TELEPHONE ENCOUNTER
RN triage   Call from pt wife -- pt gave verbal OK to talk to wife   Pt seen in UR and admitted to [facility] on Tuesaday = felt dizzy -- and blood in stool then -- seen GI =  endoscopy = new dx esophogeal CA --   hgb was 7.? -- then up to 8.?  Yesterday   Pt home yesterday -- slept OK last night -- now this AM = dizzy again --   no falls no fainting   No bleeding   Has urinated -- no blood   Has not passed stool today   Pt states he feels a little confused -- trying to process dx -- pt is oriented   Wife wants to bring pt to clinic to check hgb   Per protocol = should go to ED -- check w/ PCP first   Please advise   When and where do you want pt seen ?   Jenna Coker RN [facility] RN triage    Reason for Disposition    Follows bleeding (e.g., stomach, rectum, vagina) (Exception: became dizzy from sight of small amount blood)    Protocols used: DIZZINESS-A-OH

## 2021-05-31 NOTE — TELEPHONE ENCOUNTER
Patient's wife, Louisa, called and left message requesting an Rx of EMLA cream and wondering if chemo starting tomorrow.  She asked for call back to [phone].      1415:  I called in RX for EMLA cream per nursing standing orders to Juan, Pharmacy at [facility] on [address]/MARY Mello RN    1418:  Called and left message stating EMLA Rx sent to [facility] and can be picked up today. Asked that they call me back so can go over instructions for medications and I can discuss appointment time tomorrow/MARY Mello RN     8140:  Left message to call me back in the morning so that I can explain how to apply the EMLA cream prior to his appointment tomorrow/MARY Mello RN    5424:  In further discussing patient's schedule with Dr. Cox, she would like chemo rescheduled for next week so he his starting same time as Radiation. Message left on home and cell notifying them that Dr. Ledesma would like chemo rescheduled and we will call them back tomorrow morning to ju/MARY Mello RN

## 2021-05-31 NOTE — PROGRESS NOTES
1. Adenocarcinoma of esophagus (H)  Ambulatory referral to Oncology   2. Parkinson's disease (H)          Plan: We spent a great deal of time today discussing this diagnosis and what our plan is going to be going forward.  We will get him set up with the oncologist.  He already has a scheduled appointment with Dr. Alexandra for surgery.  We will check a hemoglobin in a few days when it long enough to make a determination for some stability.  He is aware that we are certainly here to help manage and coordinate his care with the specialist.  If he has any further questions or issues he can certainly give our office a call or come on anytime that he would like to be seen.    45 minutes total time spent together with the patient today, more than 50% of that time spent in counseling, discussing the above topics.      Subjective: 79-year-old male new patient to our clinic who is being seen here for hospital follow-up and management of care.  Please see previous notes from the hospital stay last week.  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  His bleeding then stopped and he was given some blood and was informed of the diagnosis that he is aware he has an appointment with a surgeon coming up but does not have one yet made with the oncologist.  He is feeling weak but is happy that his bleeding is stopped at least for now.  He has previously has a lot of questions about his diagnosis as well as his wife that is here with him today.    He is a new patient to our clinic today we reviewed his past medical history medications allergies family social history today.    A comprehensive Review of Systems was performed, and other than the positives mentioned above, the ROS was negative.     Objective: Mildly ill-appearing male but in no acute distress.  He is pale.  Vital  signs as noted.  Ears are clear bilaterally.  Eyes and nose are normal.  R eyes without erythema or exudate.  Chest clear to auscultation.  Heart regular rate and rhythm.  Abdomen slightly tender in the epigastrium no guarding or rebound is present.  Bowel sounds present in all quadrants.

## 2021-05-31 NOTE — TELEPHONE ENCOUNTER
"Patient has a lab appointment on 8/12 but there are no orders. Notes say \"hbg\". Please place orders.  "

## 2021-05-31 NOTE — PROGRESS NOTES
In Basket Message received for Medical Oncology Consult, referring physician Dr France and Dr Tad Tovar.  Placed call to Avi and spoke with him and his wife Louisa to schedule appointment. Same scheduled for Friday August 16th 2019 at 3:00 pm with Dr Cox, with a nurse appt at 2:30pm. [facility]vi with introduction letter, MD bio card, [facility] intake form, medication and allergy list, [facility] and appointment letter. Work up for GE Junction Adenocarcinoma has been done at [facility] and [facility].      Will work with medical records to ensure any needed records are collected. Patient was with [facility], no workup for present condition done at [facility], records pulled into Epic on 8/12/2019. Workup for GE Junction Adenocarcinoma done at [facility] and [facility].      Explained that I would be Avi's nurse navigator.  I generally provide assistance with psycho social needs including but not limited to, financial assistance, obtaining education materials, transportation assistance, help with meals, community programs, and help finding support or support groups, getting connected with our psychotherapist when needed.  I am also here to help guide you through our system.      I am in the office Monday thru Friday. [phone]. I am in and out of my office throughout the day, If I do not answer my phone please leave me a message and I will get back to you as soon as possible. If you are ever unsure of who to call you can always contact me and I will help assist you in any way that I can.  Medication or symptom management needs typically go through our triage nurse who can be reached by calling the main clinic number. [phone]    Please do not hesitate to contact me if you have any questions or concerns.      Explained need to arrive at time given 2:30 pm and complete the health history form and medication and allergy list and bring both to appointment.

## 2021-05-31 NOTE — TELEPHONE ENCOUNTER
I spoke with Louisa and she is going to be bringing him to the ED per Dr. Tovar.     Cele Diaz, A

## 2021-06-01 ENCOUNTER — RECORDS - HEALTHEAST (OUTPATIENT)
Dept: ADMINISTRATIVE | Facility: CLINIC | Age: 81
End: 2021-06-01

## 2021-06-01 NOTE — PROGRESS NOTES
Patient and wife asked to see nurse today for new symptoms this morning.   Patient had two diarrhea stools this morning and wife had questions about what to do.  Orthostatic BP done with little change in BP but tachycardiac heart rate at 127 standing after 87 sitting.  Talked with wife and patient about using imodium AD as needed for diarrhea and if this did not help to call the triage line for further assistance.  Patient did feel a little nauseated this morning and used his ondansetron with some relief.  Told wife to keep on a schedule of this for today if continued upset stomach.  Encouraged continued fluids for dehydration and suggested sport drinks or Pedialyte for this.  Both patient and wife verbalized understanding and have call back number for any questions or concerns.

## 2021-06-01 NOTE — PROGRESS NOTES
Pt arrived at [facility] to see Juancho PLEITEZ. Pt has Esophageal Cancer and is here for treatment.

## 2021-06-01 NOTE — PROGRESS NOTES
[facility] Hematology and Oncology Progress Note    Patient: Avi Villafana  MRN: [id number]  Date of Service: 09/12/2019        Reason for Visit    Chief Complaint   Patient presents with     HE Cancer     Esophageal Cancer       Assessment and Plan  Cancer Staging  Adenocarcinoma of esophagus (H)  Staging form: Esophagus - Adenocarcinoma, AJCC 8th Edition  - Clinical stage from 8/23/2019: Stage III (cT2, cN1, cM0, G2) - Signed by Gena Cowan MD on 8/23/2019      ECOG Performance   ECOG Performance Status: 0     Distress Assessment  Distress Assessment Score: No distress    Pain  Currently in Pain: No/denies  Pain Score (Initial OR Reassessment): No/Denies Pain    #. cT2 cN1 M0 adenocarcinoma of the lower third of the esophagus (GE junction).   He is here for cycle #2 chemotherapy.  Clinically adequate. His blood pressure has been running on the low side.  However he reported slight dizziness with standing.  His oral fluid intake appears to be adequate.  Noted that his creatinine was up a little bit from his baseline.  I discussed with him that I will give him 1 L of IV fluids today and he agreed.  He will continue current dose of carboplatin and paclitaxel.  Based on his creatinine clearance, no chemotherapy dose reduction is needed at this point.   He is advised to call us with any concerns.   Return to clinic in 1 week with labs and ongoing chemotherapy with radiation.     #.  Iron deficiency anemia    hemoglobin has improved.  He received first dose of Feraheme last week.  He will receive the second dose today.    #.  Healthcare maintenance   -Advised to hold Shingrix your influenza vaccine during the concurrent chemoradiation, mainly to avoid febrile illness or concern of lack of immune response from immunization.   -Okay to receive acupuncture or chiropractor.   -Okay to resume boxing as physical therapy exercises for his Parkinson disease.        Problem List    1. Adenocarcinoma of esophagus  (H)  CC OFFICE VISIT LONG    CC OFFICE VISIT LONG    CC OFFICE VISIT LONG    Infusion Appointment    DISCONTINUED: sodium chloride 0.9% 250 mL infusion    DISCONTINUED: ondansetron injection 8 mg (ZOFRAN)    DISCONTINUED: dexamethasone injection 10 mg (DECADRON)    DISCONTINUED: famotidine 20 mg/2 mL injection 20 mg (PEPCID)    DISCONTINUED: diphenhydrAMINE injection 25 mg (BENADRYL)    DISCONTINUED: PACLitaxel 108 mg in NaCl 0.9% (non-PVC) 250 mL (TAXOL)    DISCONTINUED: CARBOplatin 160 mg in dextrose 5% 250 mL chemo (PARAPLATIN)    DISCONTINUED: sodium chloride flush 20 mL (NS)    DISCONTINUED: heparin lockflush (PF) porcine 300-600 Units    DISCONTINUED: diphenhydrAMINE injection 50 mg (BENADRYL)    DISCONTINUED: famotidine 20 mg/2 mL injection 20 mg (PEPCID)    DISCONTINUED: hydrocortisone sod succ (PF) injection 100 mg    DISCONTINUED: acetaminophen tablet 1,000 mg (TYLENOL)    DISCONTINUED: sodium chloride 0.9% 500 mL   2. Encounter for antineoplastic chemotherapy  CC OFFICE VISIT LONG    CC OFFICE VISIT LONG    CC OFFICE VISIT LONG    Infusion Appointment    DISCONTINUED: sodium chloride 0.9% 250 mL infusion    DISCONTINUED: ondansetron injection 8 mg (ZOFRAN)    DISCONTINUED: dexamethasone injection 10 mg (DECADRON)    DISCONTINUED: famotidine 20 mg/2 mL injection 20 mg (PEPCID)    DISCONTINUED: diphenhydrAMINE injection 25 mg (BENADRYL)    DISCONTINUED: PACLitaxel 108 mg in NaCl 0.9% (non-PVC) 250 mL (TAXOL)    DISCONTINUED: CARBOplatin 160 mg in dextrose 5% 250 mL chemo (PARAPLATIN)    DISCONTINUED: sodium chloride flush 20 mL (NS)    DISCONTINUED: heparin lockflush (PF) porcine 300-600 Units    DISCONTINUED: diphenhydrAMINE injection 50 mg (BENADRYL)    DISCONTINUED: famotidine 20 mg/2 mL injection 20 mg (PEPCID)    DISCONTINUED: hydrocortisone sod succ (PF) injection 100 mg    DISCONTINUED: acetaminophen tablet 1,000 mg (TYLENOL)    DISCONTINUED: sodium chloride 0.9% 500 mL       ______________________________________________________________________________  Diagnosis  2019-admitted to bone invasive moderately differentiated adenocarcinoma of GE junction.  EUS findin mm in maximal thickness, T2.  Celiac axis was normal.  Multiple benign cysts in the liver.  A few regional lymph nodes were suspicious and 1 of the gastrohepatic lymph node was positive for metastatic adenocarcinoma.   -2019-PET scan showed distal esophageal carcinoma with paraesophageal and gastrohepatic ligament lymph node metastases above the level of celiac axis.  No evidence of distant metastases.   - no loss of MMR. HER2- negative. PDL1 CPS 2     Treatment to date  2019-concurrent chemotherapy and radiation (carboplatin/paclitaxel)    History of Present Illness    Avi is here accompanied by his wife, Louisa.  He started chemoradiation a week ago.  Doing okay.  He reported intermittent dizziness with standing.  Drinks 10 glasses of water, 1 cup of coffee a day.  His wife has been watching his fluid intake very closely.  Appetite is good.  Denies heartburn.    Pain Status  Currently in Pain: No/denies    Review of Systems    Oncology Nurse Assessment/CMA Intake: Constitutional  Constitutional (WDL): Exceptions to WDL  Fatigue: None  Fever: None  Chills: None  Neurosensory  Neurosensory (WDL): Exceptions to WDL  Peripheral Motor Neuropathy: None  Ataxia: None  Peripheral Sensory Neuropathy: None  Confusion: None  Syncope: None  Eye   Eye Disorder (WDL): Exceptions to WDL(itchy)  Blurred Vision: None  Dry Eye: Asymptomatic, clinical or diagnostic observations only, mild symptoms relieved by lubricants  Eye Pain: None  Watering Eyes: None  Ear  Ear Disorder (WDL): All ear disorder elements are within defined limits  Ear Pain: None  Tinnitus: None  Cardiovascular  Cardiovascular (WDL): All cardiovascular elements are within defined limits  Pulmonary  Respiratory (WDL): Exceptions to WDL  Cough: Mild  symptoms, nonprescription intervention indicated(occ)  Dyspnea: None  Hypoxia: None  Gastrointestinal  Gastrointestinal (WDL): All gastrointestinal elements are within defined limits  Genitourinary  Genitourinary (WDL): All genitourinary elements are within defined limits  Lymphatic  Lymph (WDL): All lymph disorder elements are within defined limits  Musculoskeletal and Connective Tissue  Musculoskeletal and Connetive Tissue Disorders (WDL): All Musculoskeletal and Connetive Tissue Disorder elements are within defined limits  Integumentary  Integumentary (WDL): Exceptions to WDL  Alopecia: Hair loss of up to 50% of normal for that individual that is not obvious from a distance but only on close inspection, a different hair style may be required to cover the hair loss but it does not require a wig or hair piece to camouflage  Rash Maculo-Papular: None  Pruritus: None  Urticaria: None  Palmar-Plantar Erythrodysesthesia Syndrome: None  Flushing: None  Patient Coping  Patient Coping: Accepting;Open/discussion  Distress Assessment  Distress Assessment Score: No distress  Accompanied by  Accompanied by: Family Member(wife, Louisa)  Oral Chemo Adherence         Past History  Past Medical History:   Diagnosis Date     Adenocarcinoma of esophagus (H) 8/9/2019     Family history of myocardial infarction     mom 50's     GERD (gastroesophageal reflux disease)      High cholesterol      History of transfusion      Parkinson disease (H)        Physical Exam    Recent Vitals 9/12/2019   Weight -   BSA (m2) -   /61   Pulse 69   Temp -   Temp src -   SpO2 -   Some recent data might be hidden     General: alert, awake, not in acute distress  HEENT: Head: Normal, normocephalic, atraumatic.  Eye: Normal external eye, conjunctiva, lids cornea, MINH.  Ears:  Non-tender.  Nose: Normal external nose, mucus membranes and septum.  Pharynx: Dental Hygiene adequate. Normal buccal mucosa. Normal pharynx.  Neck / Thyroid: Supple, no  masses, nodes, nodules or enlargement.  Lymphatics: No abnormally enlarged lymph nodes.  Chest: Normal chest wall and respirations. Clear to auscultation.  Heart: S1 S2 RRR, no murmur.   Abdomen: abdomen is soft without significant tenderness, masses, organomegaly or guarding  Extremities: normal strength, tone, and muscle mass  Skin: normal. no rash or abnormalities  CNS: non focal.    Lab Results    Recent Results (from the past 168 hour(s))   Comprehensive Metabolic Panel   Result Value Ref Range    Sodium 140 136 - 145 mmol/L    Potassium 4.5 3.5 - 5.0 mmol/L    Chloride 108 (H) 98 - 107 mmol/L    CO2 22 22 - 31 mmol/L    Anion Gap, Calculation 10 5 - 18 mmol/L    Glucose 119 70 - 125 mg/dL    BUN 18 8 - 28 mg/dL    Creatinine 1.38 (H) 0.70 - 1.30 mg/dL    GFR MDRD Af Amer >60 >60 mL/min/1.73m2    GFR MDRD Non Af Amer 50 (L) >60 mL/min/1.73m2    Bilirubin, Total 0.5 0.0 - 1.0 mg/dL    Calcium 8.9 8.5 - 10.5 mg/dL    Protein, Total 6.3 6.0 - 8.0 g/dL    Albumin 3.7 3.5 - 5.0 g/dL    Alkaline Phosphatase 46 45 - 120 U/L    AST 13 0 - 40 U/L    ALT <9 0 - 45 U/L   HM1 (CBC with Diff)   Result Value Ref Range    WBC 5.0 4.0 - 11.0 thou/uL    RBC 3.67 (L) 4.40 - 6.20 mill/uL    Hemoglobin 9.3 (L) 14.0 - 18.0 g/dL    Hematocrit 31.0 (L) 40.0 - 54.0 %    MCV 85 80 - 100 fL    MCH 25.3 (L) 27.0 - 34.0 pg    MCHC 30.0 (L) 32.0 - 36.0 g/dL    RDW 18.0 (H) 11.0 - 14.5 %    Platelets 193 140 - 440 thou/uL    MPV 10.8 8.5 - 12.5 fL    Neutrophils % 75 (H) 50 - 70 %    Lymphocytes % 18 (L) 20 - 40 %    Monocytes % 4 2 - 10 %    Eosinophils % 1 0 - 6 %    Basophils % 1 0 - 2 %    Neutrophils Absolute 3.8 2.0 - 7.7 thou/uL    Lymphocytes Absolute 0.9 0.8 - 4.4 thou/uL    Monocytes Absolute 0.2 0.0 - 0.9 thou/uL    Eosinophils Absolute 0.1 0.0 - 0.4 thou/uL    Basophils Absolute 0.0 0.0 - 0.2 thou/uL       Imaging    Xr C Arm Less Than One Hour    Result Date: 8/26/2019  Please see Operative or Procedure Note for details on this  exam or Radiology Report for body part of interest (if applicable).     Xr Chest 1 View    Result Date: 8/26/2019  EXAM: XR CHEST 1 VIEW LOCATION: [facility] DATE/TIME: 8/26/2019 2:43 PM INDICATION: Malignant neoplasm of esophagus, unspecified. Post portacatheter placement. COMPARISON: CT 8/7/2019. FINDINGS: Portacatheter tip at the distal SVC. No pneumothorax or pleural effusion.     Nm Pet Ct Skull To Mid Thigh    Result Date: 8/21/2019  EXAM: NM PET CT SKULL TO MID THIGH LOCATION: [facility] DATE/TIME: 8/21/2019 11:30 AM INDICATION: Initial treatment strategy for staging malignant neoplasm of lower third of esophagus COMPARISON: CT 08/07/2019 TECHNIQUE: Serum glucose level 98 mg/dL. One hour post intravenous administration of 10.9 mCi F-18 FDG, PET imaging was performed from the skull base to the mid thighs utilizing attenuation correction with concurrent axial CT and PET/CT image fusion. Dose reduction techniques were used. FINDINGS: Extremely FDG avid (SUVmax 26.0) annular wall thickening in the distal esophagus extends for about 4 cm to the gastroesophageal junction. FDG avid paraesophageal and gastrohepatic ligament lymphadenopathy. The gastrohepatic ligament node, as an  example, measures 1.2 x 0.7 cm (SUVmax 3.2). FDG activity in the remainder of the body is normal. Moderate senescent intracranial changes. Moderate calcified atherosclerosis, including coronary disease and/or stents. Hypoattenuation of blood pool relative to myocardium, suggesting anemia. Tiny nonobstructing bilateral kidney stones. Bilateral kidney cysts. Liver cysts. Mildly enlarged prostate. Pelvic phleboliths. Moderate hypertrophic change throughout the spine.     CONCLUSION: Findings consistent with distal esophageal carcinoma with paraesophageal and gastrohepatic ligament lymph node metastases above the level of the celiac axis. No evidence of distant metastases.      Signed by: Spencer Cox MD

## 2021-06-01 NOTE — PROGRESS NOTES
Pt amb into clinic with spouse, Louisa, for chemo infusion. Reviewed plan of care; spouse had many questions. Pt premedicated. Pt tolerated chemo infusions very well. Port flushed and de-accessed. Pt and spouse amb out of clinic.

## 2021-06-01 NOTE — PROGRESS NOTES
RADIATION ONCOLOGY WEEKLY TREATMENT VISIT NOTE      Assessment:     1. 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 Concurrent radiation with carboplatin/paclitaxel. Scheduled to finish 5040 cGy/28 fx on 10/14/2019.      1. Continue radiation treatment as prescribed.  2. Continuing to have difficulties with eating. Discussed diet modification, will re-check weight next week to ensure adequate caloric intake. If unable patient understands he may require tube placement for feedings.    Radiation: Site: Esophagus  Stereotactic Radiosurgery: No  Concurrent Therapy: Yes (carbo/taxol)  Today's Dose: 2880  Total Dose for Thoracic: 5040  Today's Fraction/Total Fraction Thoracic: 16/28      Subjective:      HPI: Avi Villafana is a 79 y.o. male with    1. 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    Assessment                  Body Site: Thoracic Site: Esophagus  Stereotactic Radiosurgery: No  Concurrent Therapy: Yes(carbo/taxol)  Today's Dose: 2880  Total Dose for Thoracic: 5040  Today's Fraction/Total Fraction Thoracic: 16/28  Voice Chances/Stridor/Larynx: 0: Normal  Pharynx and Esphogaus: 1: Tolerates regular diet  Constipation: 0: None  Diarrhea W/O Colostomy: 0: None  Cough: 0: Absent  Dyspnea: 0: Normal                                               Sexuality Alteration                 Emotional Alteration Copin: Effective  Comfort Alteration KPS: 100 % Normal, no complaints  Fatigue (ONS scale) : 1: Mild Fatigue  Pain Location: sore throat  Pain Intensity. Rate degree of pain ranging from 0 (no pain) to 10 (severe pain) : 0-2  Pain Description: Burning - Burning, hot, fire type pain  Pain Intervention: 0: None  Effectiveness of pain intervention: 4: Pain relieved 100%   Nutrition Alteration Anorexia: 0: None  Nausea: 0: None  Vomitin: None  Dyspepsia and/or Heartburn: 0: None  Pharynx and Esphogaus: 1: Tolerates regular diet  Skin Alteration Skin Sensation: 0: No problem  Skin Reaction: 0: None  AUA Assessment                                  Accompanied by       Objective:     Exam:     Vitals:    19 1103   BP: 123/58   Pulse: 73   Temp: 97.7  F (36.5  C)   TempSrc: Oral   SpO2: 98%   Weight: 194 lb 14.4 oz (88.4 kg)       Wt Readings from Last 8 Encounters:   19 194 lb 14.4 oz (88.4 kg)   19 194 lb 11.2 oz (88.3 kg)   19 199 lb 14.4 oz (90.7 kg)   19 199 lb 3.2 oz (90.4 kg)   19 198 lb 6.4 oz (90 kg)   19 198 lb 11.2 oz (90.1 kg)   19 201 lb 1.6 oz (91.2 kg)   19 199 lb 11.2 oz (90.6 kg)       General: Alert and oriented, in no acute distress  Avi has no Erythema.    Treatment Summary to Date    Aria chart and setup information reviewed    IGena MD personally performed the services described in this documentation, as scribed by Parish Gupta in my presence, and it is both accurate and complete.    Signed by: Gena Cowan MD, MPH

## 2021-06-01 NOTE — PROGRESS NOTES
Patient here today for labs, OV with Dr. Cox, and chemo/radiation for adenocarcinoma of the esophagus/MARY Mello RN

## 2021-06-01 NOTE — PATIENT INSTRUCTIONS - HE
Recent Results (from the past 24 hour(s))   Comprehensive Metabolic Panel   Result Value Ref Range    Sodium 139 136 - 145 mmol/L    Potassium 4.2 3.5 - 5.0 mmol/L    Chloride 108 (H) 98 - 107 mmol/L    CO2 24 22 - 31 mmol/L    Anion Gap, Calculation 7 5 - 18 mmol/L    Glucose 113 70 - 125 mg/dL    BUN 18 8 - 28 mg/dL    Creatinine 1.22 0.70 - 1.30 mg/dL    GFR MDRD Af Amer >60 >60 mL/min/1.73m2    GFR MDRD Non Af Amer 58 (L) >60 mL/min/1.73m2    Bilirubin, Total 0.7 0.0 - 1.0 mg/dL    Calcium 8.8 8.5 - 10.5 mg/dL    Protein, Total 6.2 6.0 - 8.0 g/dL    Albumin 3.7 3.5 - 5.0 g/dL    Alkaline Phosphatase 49 45 - 120 U/L    AST 14 0 - 40 U/L    ALT <9 0 - 45 U/L   HM1 (CBC with Diff)   Result Value Ref Range    WBC 5.5 4.0 - 11.0 thou/uL    RBC 3.49 (L) 4.40 - 6.20 mill/uL    Hemoglobin 8.9 (L) 14.0 - 18.0 g/dL    Hematocrit 29.6 (L) 40.0 - 54.0 %    MCV 85 80 - 100 fL    MCH 25.5 (L) 27.0 - 34.0 pg    MCHC 30.1 (L) 32.0 - 36.0 g/dL    RDW 17.5 (H) 11.0 - 14.5 %    Platelets 203 140 - 440 thou/uL    MPV 10.6 8.5 - 12.5 fL    Neutrophils % 66 50 - 70 %    Lymphocytes % 23 20 - 40 %    Monocytes % 8 2 - 10 %    Eosinophils % 1 0 - 6 %    Basophils % 0 0 - 2 %    Neutrophils Absolute 3.7 2.0 - 7.7 thou/uL    Lymphocytes Absolute 1.3 0.8 - 4.4 thou/uL    Monocytes Absolute 0.5 0.0 - 0.9 thou/uL    Eosinophils Absolute 0.1 0.0 - 0.4 thou/uL    Basophils Absolute 0.0 0.0 - 0.2 thou/uL

## 2021-06-01 NOTE — PROGRESS NOTES
RADIATION ONCOLOGY WEEKLY TREATMENT VISIT NOTE      Assessment:     1. 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 Concurrent radiation with carboplatin/paclitaxel.    1. Continue radiation treatment as prescribed.  2. Eating and staying well hydrated, no difficulties/pain with swallowing. No nausea.   3. Some dizziness with sitting to standing transition that lasts for a few seconds. Increase salt/fluid intake and transition slower.     Radiation: Site: Esophagus  Stereotactic Radiosurgery: No  Concurrent Therapy: Yes (carbo/taxol)  Today's Dose: 1080  Total Dose for Thoracic: 5040  Today's Fraction/Total Fraction Thoracic: 6/28      Subjective:      HPI: Avi Villafana is a 79 y.o. male with    1. 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    Assessment                  Body Site: Thoracic Site: Esophagus  Stereotactic Radiosurgery: No  Concurrent Therapy: Yes(carbo/taxol)  Today's Dose: 1080  Total Dose for Thoracic: 5040  Today's Fraction/Total Fraction Thoracic: 6/28  Voice Chances/Stridor/Larynx: 0: Normal  Pharynx and Esphogaus: 0: No change over baseline  Constipation: 0: None(will start stool softener)  Diarrhea W/O Colostomy: 0: None  Cough: 0: Absent  Dyspnea: 0: Normal                                               Sexuality Alteration                 Emotional Alteration Copin: Effective  Comfort Alteration KPS: 100 % Normal, no complaints  Fatigue (ONS scale) : 1: Mild Fatigue  Pain Location: denies   Nutrition Alteration Anorexia: 0: None  Nausea: 0: None  Vomitin: None  Dyspepsia and/or Heartburn: 0: None  Pharynx and Esphogaus: 0: No change over baseline  Skin Alteration Skin Sensation: 0: No problem  Skin Reaction: 0: None  AUA Assessment                                  Accompanied by       Objective:     Exam:     Vitals:    19 1059   BP: 124/60   Pulse: 73   Temp: 97.8  F (36.6  C)   TempSrc: Oral   SpO2: 98%   Weight: 198 lb 6.4 oz (90 kg)       Wt Readings from Last 8 Encounters:   19 198 lb 6.4 oz (90 kg)   19 198 lb 11.2 oz (90.1 kg)   19 201 lb 1.6 oz (91.2 kg)   19 199 lb 11.2 oz (90.6 kg)   19 192 lb 3.2 oz (87.2 kg)   19 194 lb (88 kg)   19 204 lb (92.5 kg)   08/15/19 198 lb (89.8 kg)       General: Alert and oriented, in no acute distress  Avi has no Erythema.    Treatment Summary to Date    Aria chart and setup information reviewed    IGena MD personally performed the services described in this documentation, as scribed by Parish Gupta in my presence, and it is both accurate and complete.    Signed by: Gena Cowan MD, MPH

## 2021-06-01 NOTE — TELEPHONE ENCOUNTER
Last OV: 8/9/19 Est Care  Subjective: 79-year-old male new patient to our clinic who is being seen here for hospital follow-up and management of care.  Please see previous notes from the hospital stay last week.  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    Lexapro is a Historical Med for pt at this time.

## 2021-06-01 NOTE — PROGRESS NOTES
Pt amb into clinic with spouse, Louisa, for chemo infusion. Reviewed plan of care, answered many questions. Spouse very concerned about pt's weight loss. Pt premedicated. Pt tolerated infusions very well. Port flushed, de-accessed. Pt and spouse amb out of clinic.

## 2021-06-01 NOTE — PROGRESS NOTES
RADIATION ONCOLOGY WEEKLY TREATMENT VISIT NOTE      Assessment:     1. 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 Concurrent radiation with carboplatin/paclitaxel. Scheduled to finish 5040 cGy/28 fx on 10/14/2019.     1. Continue radiation treatment as prescribed.  2. Will notice infrequent sticking of food with swallowing. Chew carefully and be more mindful with eating, diet modifications discussed.     Radiation: Site: Esophagus  Stereotactic Radiosurgery: No  Concurrent Therapy: Yes (carbo/taxol)  Today's Dose: 1980  Total Dose for Thoracic: 5040  Today's Fraction/Total Fraction Thoracic: 11/28      Subjective:      HPI: Avi Villafana is a 79 y.o. male with    1. 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    Assessment                  Body Site: Thoracic Site: Esophagus  Stereotactic Radiosurgery: No  Concurrent Therapy: Yes(carbo/taxol)  Today's Dose: 1980  Total Dose for Thoracic: 5040  Today's Fraction/Total Fraction Thoracic: 11/28  Voice Chances/Stridor/Larynx: 0: Normal  Pharynx and Esphogaus: 1: Tolerates regular diet  Constipation: 0: None  Diarrhea W/O Colostomy: 0: None  Cough: 0: Absent  Dyspnea: 0: Normal                                              Sexuality Alteration                 Emotional Alteration  Copin: Effective  Comfort Alteration KPS: 100 % Normal, no complaints  Fatigue (ONS scale) : 1: Mild Fatigue  Pain Location: denies   Nutrition Alteration Anorexia: 0: None  Nausea: 0: None  Vomitin: None  Dyspepsia and/or Heartburn: 0: None  Pharynx and Esphogaus: 1: Tolerates regular diet  Skin Alteration Skin Sensation: 0: No problem  Skin Reaction: 0: None  AUA Assessment                                  Accompanied by       Objective:     Exam:     Vitals:    19 1051   BP: 124/64   Pulse: 69   Temp: 97.9  F (36.6  C)   TempSrc: Oral   SpO2: 97%   Weight: 199 lb 14.4 oz (90.7 kg)       Wt Readings from Last 8 Encounters:   19 199 lb 14.4 oz (90.7 kg)   19 199 lb 3.2 oz (90.4 kg)   19 198 lb 6.4 oz (90 kg)   19 198 lb 11.2 oz (90.1 kg)   19 201 lb 1.6 oz (91.2 kg)   19 199 lb 11.2 oz (90.6 kg)   19 192 lb 3.2 oz (87.2 kg)   19 194 lb (88 kg)       General: Alert and oriented, in no acute distress  Avi has no Erythema.    Treatment Summary to Date    Aria chart and setup information reviewed    IGena MD personally performed the services described in this documentation, as scribed by Parish Gupta in my presence, and it is both accurate and complete.    Signed by: Gena Cowan MD, MPH

## 2021-06-01 NOTE — TELEPHONE ENCOUNTER
Received return call from Avi's spouse regarding constipation. Gave her verbal instructions per our protocol. Since no BM since Friday, advised to drink 1/2-1 bottle of mag citrate today, and then continue with SennaS 2 tabs at bedtime maint. He denies pain, bloating, discomfort. She reports he is eating a balanced diet, and hydrated, however, less active due to chemo and rad tx. I advised her to call tomorrow if he doesn't produce a BM. Aparna Meyer

## 2021-06-01 NOTE — PROGRESS NOTES
Pt amb into clinic with spouse, Louisa, for first chemo infusion. Reviewed plan of care multiple times, purpose of meds, potential side effects. Pt's spouse had many questions. Pt premedicated. Pt tolerated chemo infusions well. Port flushed and capped and pt had radiation therapy. Pt returned for Feraheme infusion. Pt tolerated iron well; observed for over 30 min without incident. Reviewed dc instructions including whom and when to call. Pt and spouse amb out of clinic.

## 2021-06-01 NOTE — PATIENT INSTRUCTIONS - HE
Recent Results (from the past 24 hour(s))   Comprehensive Metabolic Panel   Result Value Ref Range    Sodium 138 136 - 145 mmol/L    Potassium 4.1 3.5 - 5.0 mmol/L    Chloride 111 (H) 98 - 107 mmol/L    CO2 22 22 - 31 mmol/L    Anion Gap, Calculation 5 5 - 18 mmol/L    Glucose 106 70 - 125 mg/dL    BUN 14 8 - 28 mg/dL    Creatinine 1.05 0.70 - 1.30 mg/dL    GFR MDRD Af Amer >60 >60 mL/min/1.73m2    GFR MDRD Non Af Amer >60 >60 mL/min/1.73m2    Bilirubin, Total 0.6 0.0 - 1.0 mg/dL    Calcium 8.6 8.5 - 10.5 mg/dL    Protein, Total 6.0 6.0 - 8.0 g/dL    Albumin 3.6 3.5 - 5.0 g/dL    Alkaline Phosphatase 39 (L) 45 - 120 U/L    AST 11 0 - 40 U/L    ALT <9 0 - 45 U/L   HM1 (CBC with Diff)   Result Value Ref Range    WBC 2.2 (L) 4.0 - 11.0 thou/uL    RBC 3.76 (L) 4.40 - 6.20 mill/uL    Hemoglobin 9.6 (L) 14.0 - 18.0 g/dL    Hematocrit 31.9 (L) 40.0 - 54.0 %    MCV 85 80 - 100 fL    MCH 25.5 (L) 27.0 - 34.0 pg    MCHC 30.1 (L) 32.0 - 36.0 g/dL    RDW 22.3 (H) 11.0 - 14.5 %    Platelets 126 (L) 140 - 440 thou/uL    MPV 10.4 8.5 - 12.5 fL    Neutrophils % 73 (H) 50 - 70 %    Lymphocytes % 17 (L) 20 - 40 %    Monocytes % 7 2 - 10 %    Eosinophils % 1 0 - 6 %    Basophils % 1 0 - 2 %    Neutrophils Absolute 1.6 (L) 2.0 - 7.7 thou/uL    Lymphocytes Absolute 0.4 (L) 0.8 - 4.4 thou/uL    Monocytes Absolute 0.2 0.0 - 0.9 thou/uL    Eosinophils Absolute 0.0 0.0 - 0.4 thou/uL    Basophils Absolute 0.0 0.0 - 0.2 thou/uL   Manual Differential   Result Value Ref Range    Platelet Estimate Decreased (!) Normal    Ovalocytes 1+ (!) Negative

## 2021-06-01 NOTE — PROGRESS NOTES
Patient weight down 4 pounds since last week.  Gave patient and wife ensure samples and coupons.  Encouraged more calories and hydration.

## 2021-06-01 NOTE — PROGRESS NOTES
"CLINICAL NUTRITION SERVICES - ASSESSMENT NOTE    Avi Villafana 79 y.o. referred for MNT related to esophageal cancer    Time Spent: 30 minutes  Visit Type: Initial  Referring Physician: Dr. Cox  Pt accompanied by: wife Louisa    NUTRITION HISTORY  Factors affecting nutrition intake include: Some mild constipation   Current diet: Regular  Current appetite/intake: good/ baseline  PEG Tube: Per MD note, patient may potentially need a feeding tube to help maintain nutritional status during chemoradiation  Chemoradiation: carbo/taxol         Diet Recall  Breakfast Turkey sausage, eggs with onion/pepper/chz, toast, cantaloupe, berries   Lunch Half turkey sandwich on grain bread with cheese, onion, lettuce, tomato, pickle; potato chips; half apple, carrot sticks, Sprite, 3 small cookies   Dinner Chicken, wills salad   Snacks Bedtime snack (usually something sweet like ice cream, pie)   Beverages Milk, water, juice, coffee in the morning   Alcohol Not discussed today   Dining out Not discussed today     ANTHROPOMETRICS  Height:   Ht Readings from Last 1 Encounters:   08/26/19 6' 1\" (1.854 m)     Weight:   Wt Readings from Last 1 Encounters:   09/05/19 201 lb 1.6 oz (91.2 kg)      BMI: 26.53 kg/m2  Weight Status: Overweight  IBW: 184 lbs  % IBW: 109  Weight History: Weight has been stable. UBW is around 200 lbs.  Wt Readings from Last 10 Encounters:   09/05/19 201 lb 1.6 oz (91.2 kg)   09/05/19 199 lb 11.2 oz (90.6 kg)   08/26/19 192 lb 3.2 oz (87.2 kg)   08/21/19 194 lb (88 kg)   08/16/19 204 lb (92.5 kg)   08/15/19 198 lb (89.8 kg)   08/09/19 198 lb 4.8 oz (89.9 kg)   08/08/19 190 lb (86.2 kg)   08/06/19 196 lb 8 oz (89.1 kg)   11/29/17 208 lb (94.3 kg)       Dosing Weight: 91 kg (current weight)    Medications/vitamins/minerals/herbals:   Reviewed    Labs:   Labs reviewed    Physical Findings:  None    ASSESSED NUTRITION NEEDS:  Estimated Energy Needs: 8419-1107 kcals (25-30 kcal/kg)  Justification: " Maintenance  Estimated Protein Needs: 109-137 grams protein (1.2-1.5 g/kg)  Justification: Increased needs  Estimated Fluid Needs: 8704-1799 mL (30-35 mL/kg)  Justification: Maintenance    MALNUTRITION:  % Weight Loss: None noted  % Intake: No decreased intake noted  Subcutaneous Fat Loss: None observed  Muscle Loss: None observed  Fluid Retention: None    Malnutrition Diagnosis: Patient does not meet criteria for malnutrition    NUTRITION DIAGNOSIS:  Predicted inadequate nutrient intake (energy/ protein) related to cancer treatment as evidenced by anticipated intake less than estimated needs.    INTERVENTIONS  Recommendations / Nutrition Prescription  1. Recommend oral intake to meet assessed needs for adequate nutrition and weight maintenance.  Nutrition Education  Provided written & verbal education:   - Discussed ways to maximize calories and protein intake.  Advised pt to aim for at least 2300 kcal and 110 g protein via regular meals and snacks. Suggested small, frequent meals if appetite declines.   - Reviewed common barriers to eating.  As treatment progresses, eating may become more difficult.  Discussed ways to cope with this.   - Reviewed PO intake/PEG tube transition upon declining PO intake.  Discussed formula and administration methods (gravity and syringe feedings).  Discussed that this would be able to provide full nutrition prn.   - Reviewed ONS (Ensure Plus/Boost Plus, CIB, etc). Suggested ways to incorporate these supplements to avoid flavor fatigue. Encouraged pt to start consider purchasing supplements to have on hand for if/ when intake declines.      Provided pt with corresponding education materials/handouts on:  Six Small Meals a Day, High Calorie, High Protein Recipe booklet, High Calorie High Protein Nutrition Therapy, Oncology Nutrition Tips for Wellbeing     Pt verbalize understanding of materials provided during consult.   Patient Understanding: Excellent  Expected Compliance:  Excellent    Goals  1.  Aim for 5-6 small frequent meals  2.  Aim for 2300 kcal and 110 g protein/day  3. Weight maintenance through cancer treatment      Follow-Up Plans: Pt has RD contact information for questions.    Pt to follow up with RD in 2-3 weeks at the time of treatment.     MONITORING AND EVALUATION:  -Food intake  -Fluid/beverage intake  -Liquid meal replacement or supplement  -Weight trends      Kasey Antonio, MS, RD, LD

## 2021-06-01 NOTE — TELEPHONE ENCOUNTER
Call placed to Ann to check in and see how things are going and if they need any resources. Louisa states things are going well and that they are very pleased with all of the care they have received. Went over resources I am able to assist with,  generally provide assistance with psycho social needs including but not limited to,  financial assistance,   obtaining education materials,   transportation assistance,  help with meals,   community programs,   help finding support or support groups,   and getting connected with our psychotherapist when needed.    I am also here to help guide you through our system.    Please do not hesitate to contact me if you have any questions or concerns. At present Louisa denies any needs. States they have my card should they need anything in the future.

## 2021-06-01 NOTE — TELEPHONE ENCOUNTER
894:  Patient's wife, Louisa, left a message stating patient constipated and hasn't had a BM in 3 days. She asked for call back to [phone] for medication recommendations/MARY Mello RN     1030:  Message left to return my call/MARY Mello RN

## 2021-06-01 NOTE — PROGRESS NOTES
Pt amb into clinic with spouse, Louisa, for chemo infusion. Reviewed plan of care including additional IVF for elevated creat and Feraheme. Spouse had many questions. Pt premedicated. Pt tolerated infusion well. Feraheme infused without incident, pt observed approx 40 min and VSS. Discussed po hydration, answered many questions for spouse. Port flushed and de-accessed. Pt amb out of clinic.

## 2021-06-01 NOTE — PROGRESS NOTES
Patient here today for labs, OV with Dr. Cox, and chemo for adenocarcinoma of the esophagus/MARY Mello RN

## 2021-06-01 NOTE — PROGRESS NOTES
[facility] Hematology and Oncology Progress Note    Patient: Avi Villafana  MRN: [id number]  Date of Service: 09/26/2019        Reason for Visit:    Dose #4 weekly carboplatin + Taxol radiosensitizing chemotherapy    Assessment and Plan:    Cancer Staging    1. 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  PDL-1 pending.    79 y.o.     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   with 2 children.  Quit smoking in 1983.  Drinks alcohol ~3 times/week.    Personal history of Parkinson disease diagnosed about a year ago.  Tremors mainly in his hands and currently well controlled with Sinemet.  Normal gait.  Denies any chronic pain or sensory changes in his extremities.    08/06/19 - presented with lightheadedness and black stools with hemoglobin of 7.7 g/dL (baseline hemoglobin around 16 g/dL).      08/07/19 CT CAP without oral with IV contrast showed a focal wall thickening of the distal esophagus, a few 8 mm and smaller lymph nodes in the fat near the distal esophagus and GE junction.  No evidence of distant metastases.    08/07/19 EGD by Dr. France showed a 3 cm mass at the posterior cardia, extending up distal esophagus.  Biopsy confirmed moderately differentiated adenocarcinoma.    08/07/19 GE juction mass biopsy - c/w invasive moderately differentiated BETITO.    08/16/19 EUS showed hypoechoic mass at the GE junction, 14 mm in maximal thickness, T2 (into but not through the muscularis per prior).  Celiac axis was normal.  Multiple benign cysts in the liver.  3 small lymph nodes were seen deep to the tumor which were not assessable for biopsy.  An 8 x 6 mm gastrohepatic lymph node FNA:      Cytology c/w metastatic adenocarcinoma.     08/21/19 NM PET - distal esophageal carcinoma with paraesophageal and gastrohepatic ligament lymph node metastases above the level  of the celiac axis. No evidence of distant metastases.    Chemoradiation therapy (either neoadjvuant or definitive) was recommended.      08/26/19 - port-a-cath placed by Dr. Alexandra.     09/05/19 - dose 1/5 weekly carboplatin + paclitaxel chemotherapy concurrent with EBRT.      09/10/19 - Nutrition consult.  Nutrition support will be very important with combined chemoradiation.  Discussed potential need of feeding tube.     09/26/19:    16 fx / 2880 cGy of a planned 28 fx / 5040 cGy EBRT    CBC - WBC 2.2.  ANC 1.6.  Plts 126K.  HgB stable @ 9.6.    Neutropenia precautions reviewed.    CMP - basically WNL.    Avi presents in good spirits accompanied by his spouse anticipating week #4 radiosensitizing chemotherapy.  Other than some mild esophagitis, he has been tolerating treatment well.  He takes omeprazole twice daily which helps manage the esophagitis.  He has a fair appetite and states that he is eating well and taking oral fluids well, however his weight has dropped about 10 pounds since starting chemotherapy.  Mild nausea well managed with antiemetics.  Occasional orthostatic light headedness.    I again reviewed potential toxicities of planned therapy.    I reviewed pausing when changing positions before walking to allow his BP to acclimate to prevent falls and encouraged pushing oral fluids.     I encouraged him to be proactive in treating any nausea.    Proceed dose #4/5-6 weekly carboplatin + paclitaxel chemotherapy concurrent with EBRT.      Instructed to notify our Dept day or night with temp > 100.4F as antibiotics or even hospitalization may be indicated.    Instructed on need for 2019-20 flu vaccine.    10/03/19 - follow up dose #5/5-6 weekly carboplatin + paclitaxel chemotherapy concurrent with EBRT with labs per Treatment Plan.    Treatment goal is for cure.                  2. Iron deficiency anemia:    Secondary to blood loss from GE junction ulcerated mass    Presented with Hgb 7.7 g/dl in early  August 2019.     08/08 and 08/10/19 transfused with 1 u pRBCs.    08/06/19 iron studies - iron deficiency.     09/05 and 09/12/19 - received 510 mg IV Feraheme.      Current Hgb @ 9.6 g/dl.      ECOG Performance:     ECOG Performance Status: 0    Distress Assessment:    Distress Assessment Score: 1    Pain:           TT > 25 minutes face to face with > 50% counseling and care coordination.    Juancho Morales, CNP      CC: Tad Tovar MD  ____________________________________________________    Interim History:    Mr. Avi Villafana presents accompanied by his spouse anticipating week #4 combined modality chemo/EBRT.  He looks and feels quite good.  He is eating well and notes only rare nausea.  He denies cough, fever, chills, unusual headaches, visual or mentation disturbance bowel or bladder issues, rash.  His appetite, weight and performance status are quite stable.  He enjoys Parkinson boxing and plays golf well.    Pain Status:    Currently in Pain: No/denies    Review of Systems:    Constitutional  Constitutional (WDL): All constitutional elements are within defined limits  Neurosensory  Neurosensory (WDL): All neurosensory elements are within defined limits  Cardiovascular  Cardiovascular (WDL): All cardiovascular elements are within defined limits  Pulmonary  Respiratory (WDL): Within Defined Limits  Gastrointestinal  Gastrointestinal (WDL): Exceptions to WDL  Constipation: Occasional or intermittent symptoms, occasional use of stool softeners, laxatives, dietary modification, or enema  Genitourinary  Genitourinary (WDL): All genitourinary elements are within defined limits  Integumentary  Integumentary (WDL): Exceptions to WDL  Alopecia: Hair loss of up to 50% of normal for that individual that is not obvious from a distance but only on close inspection, a different hair style may be required to cover the hair loss but it does not require a wig or hair piece to camouflage  Patient Coping  Patient Coping:  Accepting  Distress Assessment  Distress Assessment Score: 1  Accompanied by  Accompanied by: Family Member    Past History:    Past Medical History:   Diagnosis Date     Adenocarcinoma of esophagus (H) 8/9/2019     Family history of myocardial infarction     mom 50's     GERD (gastroesophageal reflux disease)      High cholesterol      History of transfusion      Parkinson disease (H)          Past Surgical History:   Procedure Laterality Date     CARDIAC CATHETERIZATION       ESOPHAGOGASTRODUODENOSCOPY N/A 8/16/2019    Procedure: ENDOSCOPIC ULTRASOUND, FINE NEEDLE ASPIRATION;  Surgeon: Colt France MD;  Location: [facility];  Service: Gastroenterology     HERNIA REPAIR       KIDNEY STONE SURGERY N/A June 2013?     neck fusion N/A      MD ESOPHAGOGASTRODUODENOSCOPY TRANSORAL DIAGNOSTIC N/A 8/7/2019    Procedure: ESOPHAGOGASTRODUODENOSCOPY (EGD) with biopsies;  Surgeon: Colt France MD;  Location: [facility];  Service: Gastroenterology     TUNNELED VENOUS CATHETER PLACEMENT N/A 8/26/2019    Procedure: PLACEMENT PORTACATH;  Surgeon: Josiah Alexandra MD;  Location: [facility];  Service: General       Physical Exam:    Recent Vitals 9/26/2019   Weight 194 lbs 11 oz   BSA (m2) 2.13 m2   BP 92/53   Pulse 78   Temp 97.7   Temp src 1   SpO2 98   Some recent data might be hidden       GENERAL:   Pleasant.  Alert and oriented.  Comfortable.  In no acute distress.    HEENT:   Atraumatic and normocephalic.  FAINA.  EOMI.  No pallor.  No icterus.  No mucosal lesions.    LYMPH NODES:  No palpable cervical, axillary or inguinal lymphadenopathy.    CHEST:   Lungs clear.    CVS:    S1 and S2 heard.  Regular rate and rhythm.  No murmur, gallop or rub heard.  No peripheral edema.    ABDOMEN:   Soft.  Not tender or distended.  No palpable hepatomegaly or splenomegaly.    EXTREMITIES:  Warm.  No JOSUÉ.    SKIN:    No rash, bruising or purpura noted.      Lab Results:    Reviewed with  patient.    Recent Results (from the past 24 hour(s))   Comprehensive Metabolic Panel   Result Value Ref Range    Sodium 138 136 - 145 mmol/L    Potassium 4.1 3.5 - 5.0 mmol/L    Chloride 111 (H) 98 - 107 mmol/L    CO2 22 22 - 31 mmol/L    Anion Gap, Calculation 5 5 - 18 mmol/L    Glucose 106 70 - 125 mg/dL    BUN 14 8 - 28 mg/dL    Creatinine 1.05 0.70 - 1.30 mg/dL    GFR MDRD Af Amer >60 >60 mL/min/1.73m2    GFR MDRD Non Af Amer >60 >60 mL/min/1.73m2    Bilirubin, Total 0.6 0.0 - 1.0 mg/dL    Calcium 8.6 8.5 - 10.5 mg/dL    Protein, Total 6.0 6.0 - 8.0 g/dL    Albumin 3.6 3.5 - 5.0 g/dL    Alkaline Phosphatase 39 (L) 45 - 120 U/L    AST 11 0 - 40 U/L    ALT <9 0 - 45 U/L   HM1 (CBC with Diff)   Result Value Ref Range    WBC 2.2 (L) 4.0 - 11.0 thou/uL    RBC 3.76 (L) 4.40 - 6.20 mill/uL    Hemoglobin 9.6 (L) 14.0 - 18.0 g/dL    Hematocrit 31.9 (L) 40.0 - 54.0 %    MCV 85 80 - 100 fL    MCH 25.5 (L) 27.0 - 34.0 pg    MCHC 30.1 (L) 32.0 - 36.0 g/dL    RDW 22.3 (H) 11.0 - 14.5 %    Platelets 126 (L) 140 - 440 thou/uL    MPV 10.4 8.5 - 12.5 fL    Neutrophils % 73 (H) 50 - 70 %    Lymphocytes % 17 (L) 20 - 40 %    Monocytes % 7 2 - 10 %    Eosinophils % 1 0 - 6 %    Basophils % 1 0 - 2 %    Neutrophils Absolute 1.6 (L) 2.0 - 7.7 thou/uL    Lymphocytes Absolute 0.4 (L) 0.8 - 4.4 thou/uL    Monocytes Absolute 0.2 0.0 - 0.9 thou/uL    Eosinophils Absolute 0.0 0.0 - 0.4 thou/uL    Basophils Absolute 0.0 0.0 - 0.2 thou/uL   Manual Differential   Result Value Ref Range    Platelet Estimate Decreased (!) Normal    Ovalocytes 1+ (!) Negative       Imaging:    No new imaging.

## 2021-06-01 NOTE — PROGRESS NOTES
[facility] Hematology and Oncology Progress Note    Patient: Avi Villafana  MRN: [id number]  Date of Service: 09/05/2019        Reason for Visit:    D1C1 carboplatin + Taxol radiosensitizing chemotherapy    Assessment and Plan:    Cancer Staging    1. 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  PDL-1 pending.    79 y.o.     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  Quit smoking in 1983.  Drinks alcohol ~3 times/week.    Personal history of Parkinson disease diagnosed about a year ago.  Tremors mainly in his hands and currently well controlled with Sinemet.  Normal gait.  Denies any chronic pain or sensory changes in his extremities.    08/06/19 - presented with lightheadedness and black stools with hemoglobin of 7.7 g/dL (baseline hemoglobin around 16 g/dL).      08/07/19 CT CAP without oral with IV contrast showed a focal wall thickening of the distal esophagus, a few 8 mm and smaller lymph nodes in the fat near the distal esophagus and GE junction.  No evidence of distant metastases.    08/07/19 EGD by Dr. France showed a 3 cm mass at the posterior cardia, extending up distal esophagus.  Biopsy confirmed moderately differentiated adenocarcinoma.    08/07/19 GE juction mass biopsy - c/w invasive moderately differentiated BETITO.    08/16/19 EUS showed hypoechoic mass at the GE junction, 14 mm in maximal thickness, T2 (into but not through the muscularis per prior).  Celiac axis was normal.  Multiple benign cysts in the liver.  3 small lymph nodes were seen deep to the tumor which were not assessable for biopsy.  An 8 x 6 mm gastrohepatic lymph node FNA:      Cytology c/w metastatic adenocarcinoma.     08/21/19 NM PET - distal esophageal carcinoma with paraesophageal and gastrohepatic ligament lymph node metastases above the level of the  celiac axis. No evidence of distant metastases.    Chemoradiation therapy (either neoadjvuant or definitive) was recommended.      08/26/19 - port-a-cath placed by Dr. Alexandra.     09/05/19:    CBC - WBC, ANC and Plts WNL.  HgB stable @ 8.9.    CMP - basically WNL.    Avi presents in good spirits accompanied by his spouse anticipating week #1 radiosensitizing chemotherapy.  EBRT begins this afternoon.  He currently has a good appetite and is eating well.     I reviewed potential toxicities of planned therapy.    He has attended the chenotherapy education class given by our nurses.    Any residual questions were addressed to patient's satisfaction.    Consent to treat has been signed.    Patient has picked up antiemetic medications from his pharmacy.  I encouraged him to be proactive in treating any nausea.    Proceed D1C1 weekly carboplatin + paclitaxel (5 doses) chemotherapy concurrent with EBRT.      Patient was instructed that he need to notify our Dept day or night with temp > 100.4F as antibiotics or even hospitalization may be indicated.    09/10/19 - Nutrition consult.  Nutrition support will be very important with combined chemoradiation.  Discussed potential need of feeding tube.       0912/19 - weekly follow up with labs per Treatment Plan.    Treatment goal is for cure.                  2. Iron deficiency anemia:    Secondary to blood loss from GE junction ulcerated mass    Presented with Hgb 7.7 g/dl in early August 2019.     Received 1 unit of PRBCs.    Current Hgb @ 8.9 g/dl.     Iron studies suggest iron deficiency.     IV Feraheme 2 doses, 1 week apart ordered.       ECOG Performance:     ECOG Performance Status: 0    Distress Assessment:    Distress Assessment Score: 3    Pain:  Pain Score (Initial OR Reassessment): No/Denies Pain        TT > 25 minutes face to face with > 50% counseling and care coordination.    Juancho Morales, CNP      CC: Tad Tovar,  MD  ____________________________________________________    Interim History:    Mr. Avi Villafana presents accompanied by his spouse anticipating starting his combined modality chemo/EBRT today.  He looks and feels quite good.  He is eating well and denies any nausea or vomiting.  He denies cough, fever, chills, unusual headaches, visual or mentation disturbance bowel or bladder issues, rash.  His appetite, weight and performance status are very stable.  He enjoys Parkinson boxing and plays golf well.    Pain Status:    Currently in Pain: No/denies    Review of Systems:    Constitutional  Constitutional (WDL): All constitutional elements are within defined limits  Neurosensory  Neurosensory (WDL): All neurosensory elements are within defined limits  Cardiovascular  Cardiovascular (WDL): All cardiovascular elements are within defined limits  Pulmonary  Respiratory (WDL): Within Defined Limits  Gastrointestinal  Gastrointestinal (WDL): All gastrointestinal elements are within defined limits  Genitourinary  Genitourinary (WDL): All genitourinary elements are within defined limits  Integumentary  Integumentary (WDL): Exceptions to WDL  Alopecia: Hair loss of up to 50% of normal for that individual that is not obvious from a distance but only on close inspection, a different hair style may be required to cover the hair loss but it does not require a wig or hair piece to camouflage  Patient Coping     Distress Assessment  Distress Assessment Score: 3  Accompanied by  Accompanied by: Family Member    Past History:    Past Medical History:   Diagnosis Date     Adenocarcinoma of esophagus (H) 8/9/2019     Family history of myocardial infarction     mom 50's     GERD (gastroesophageal reflux disease)      High cholesterol      History of transfusion      Parkinson disease (H)          Past Surgical History:   Procedure Laterality Date     CARDIAC CATHETERIZATION       ESOPHAGOGASTRODUODENOSCOPY N/A 8/16/2019    Procedure:  ENDOSCOPIC ULTRASOUND, FINE NEEDLE ASPIRATION;  Surgeon: Colt France MD;  Location: [facility];  Service: Gastroenterology     HERNIA REPAIR       KIDNEY STONE SURGERY N/A June 2013?     neck fusion N/A      NC ESOPHAGOGASTRODUODENOSCOPY TRANSORAL DIAGNOSTIC N/A 8/7/2019    Procedure: ESOPHAGOGASTRODUODENOSCOPY (EGD) with biopsies;  Surgeon: Colt France MD;  Location: [facility];  Service: Gastroenterology     TUNNELED VENOUS CATHETER PLACEMENT N/A 8/26/2019    Procedure: PLACEMENT PORTACATH;  Surgeon: Josiah Alexandra MD;  Location: [facility];  Service: General       Physical Exam:    Recent Vitals 9/5/2019   Height -   Weight 199 lbs 11 oz   BSA (m2) 2.16 m2   BP 76/45   Pulse 77   Temp 97.7   Temp src 1   SpO2 97   Some recent data might be hidden       GENERAL:   Pleasant.  Alert and oriented.  Comfortable.  In no acute distress.    HEENT:   Atraumatic and normocephalic.  FAINA.  EOMI.  No pallor.  No icterus.  No mucosal lesions.    LYMPH NODES:  No palpable cervical, axillary or inguinal lymphadenopathy.    CHEST:   Lungs clear to auscultation bilaterally.    CVS:    S1 and S2 heard.  Regular rate and rhythm.  No murmur, gallop or rub heard.  No peripheral edema.    ABDOMEN:   Soft.  Not tender.  Not distended.  No palpable hepatomegaly or splenomegaly.    EXTREMITIES:  Warm.  No JOSUÉ.    SKIN:    No rash, bruising or purpura noted.      Lab Results:    Reviewed with patient.    Recent Results (from the past 24 hour(s))   Comprehensive Metabolic Panel   Result Value Ref Range    Sodium 139 136 - 145 mmol/L    Potassium 4.2 3.5 - 5.0 mmol/L    Chloride 108 (H) 98 - 107 mmol/L    CO2 24 22 - 31 mmol/L    Anion Gap, Calculation 7 5 - 18 mmol/L    Glucose 113 70 - 125 mg/dL    BUN 18 8 - 28 mg/dL    Creatinine 1.22 0.70 - 1.30 mg/dL    GFR MDRD Af Amer >60 >60 mL/min/1.73m2    GFR MDRD Non Af Amer 58 (L) >60 mL/min/1.73m2    Bilirubin, Total 0.7 0.0 - 1.0 mg/dL     Calcium 8.8 8.5 - 10.5 mg/dL    Protein, Total 6.2 6.0 - 8.0 g/dL    Albumin 3.7 3.5 - 5.0 g/dL    Alkaline Phosphatase 49 45 - 120 U/L    AST 14 0 - 40 U/L    ALT <9 0 - 45 U/L   HM1 (CBC with Diff)   Result Value Ref Range    WBC 5.5 4.0 - 11.0 thou/uL    RBC 3.49 (L) 4.40 - 6.20 mill/uL    Hemoglobin 8.9 (L) 14.0 - 18.0 g/dL    Hematocrit 29.6 (L) 40.0 - 54.0 %    MCV 85 80 - 100 fL    MCH 25.5 (L) 27.0 - 34.0 pg    MCHC 30.1 (L) 32.0 - 36.0 g/dL    RDW 17.5 (H) 11.0 - 14.5 %    Platelets 203 140 - 440 thou/uL    MPV 10.6 8.5 - 12.5 fL    Neutrophils % 66 50 - 70 %    Lymphocytes % 23 20 - 40 %    Monocytes % 8 2 - 10 %    Eosinophils % 1 0 - 6 %    Basophils % 0 0 - 2 %    Neutrophils Absolute 3.7 2.0 - 7.7 thou/uL    Lymphocytes Absolute 1.3 0.8 - 4.4 thou/uL    Monocytes Absolute 0.5 0.0 - 0.9 thou/uL    Eosinophils Absolute 0.1 0.0 - 0.4 thou/uL    Basophils Absolute 0.0 0.0 - 0.2 thou/uL         Imaging:    No new imaging.

## 2021-06-01 NOTE — PROGRESS NOTES
[facility] Hematology and Oncology Progress Note    Patient: Avi Villafana  MRN: [id number]  Date of Service: 09/19/2019        Reason for Visit    Chief Complaint   Patient presents with     HE Cancer     Esophageal Cancer       Assessment and Plan  Cancer Staging  Adenocarcinoma of esophagus (H)  Staging form: Esophagus - Adenocarcinoma, AJCC 8th Edition  - Clinical stage from 8/23/2019: Stage III (cT2, cN1, cM0, G2) - Signed by Gena Cowan MD on 8/23/2019      ECOG Performance   ECOG Performance Status: 0     Distress Assessment  Distress Assessment Score: 4(visit today)    Pain  Currently in Pain: No/denies  Pain Score (Initial OR Reassessment): No/Denies Pain    #. 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  Currently undergoing chemoradiation.   Here for cycle #3 chemotherapy.  He is clinically well.  Low blood pressure as below.  Labs are fairly unremarkable.  Creatinine has improved to normal range.  We will continue chemotherapy without dose reduction.     Follow-up in 1 week with labs, provider visit and infusion appointment per protocol.     #.  Iron deficiency anemia   Hemoglobin stable.  Received 2 doses of Feraheme in September 2019.  We will continue to monitor hemogram per treatment protocol.     #.  Runny nose   Likely from seasonal allergy or chemotherapy induced.  Okay to take antihistamine, Claritin or Zyrtec.    #.  Hyportension.   I suspect it is related to his Parkinson.  Asymptomatic with occasional dizziness.  He received a fluid challenge last week and noted respond to IV fluids with improvement of blood pressure to 131/61 temporarily.   His blood pressure today is relatively low as well.  He is completely asymptomatic.  Electrolytes are normal.  Continue oral fluid intakes and slow in transition and movement.       Problem List    1. Encounter for antineoplastic chemotherapy  CC OFFICE VISIT LONG    Infusion Appointment   2. Adenocarcinoma of  esophagus (H)  CC OFFICE VISIT LONG    Infusion Appointment      ______________________________________________________________________________  Diagnosis  2019-admitted to bone invasive moderately differentiated adenocarcinoma of GE junction.  EUS findin mm in maximal thickness, T2.  Celiac axis was normal.  Multiple benign cysts in the liver.  A few regional lymph nodes were suspicious and 1 of the gastrohepatic lymph node was positive for metastatic adenocarcinoma.   -2019-PET scan showed distal esophageal carcinoma with paraesophageal and gastrohepatic ligament lymph node metastases above the level of celiac axis.  No evidence of distant metastases.   - no loss of MMR. HER2- negative. PDL1 CPS 2     Treatment to date  2019-concurrent chemotherapy and radiation (carboplatin/paclitaxel)    History of Present Illness    Avi presents today accompanied by his wife, Louisa.  He continues to have occasional dizziness especially with position change.  He noted more phlegm.  No hemoptysis or blood in the phlegm.  No fever.  Eating very well.  Denies heartburn.  Drinking fluids very well as well.     Pain Status  Currently in Pain: No/denies    Review of Systems    Oncology Nurse Assessment/CMA Intake: Constitutional  Constitutional (WDL): All constitutional elements are within defined limits  Neurosensory  Neurosensory (WDL): Exceptions to WDL  Peripheral Motor Neuropathy: None  Ataxia: None  Peripheral Sensory Neuropathy: None  Confusion: None  Syncope: None  Eye   Eye Disorder (WDL): Exceptions to WDL  Blurred Vision: None  Dry Eye: Asymptomatic, clinical or diagnostic observations only, mild symptoms relieved by lubricants  Eye Pain: None  Watering Eyes: None  Ear  Ear Disorder (WDL): All ear disorder elements are within defined limits  Cardiovascular  Cardiovascular (WDL): All cardiovascular elements are within defined limits  Pulmonary  Respiratory (WDL): Exceptions to WDL(noticed swallowing more  phlegm-thicker)  Cough: Mild symptoms, nonprescription intervention indicated(occ)  Dyspnea: None  Hypoxia: None  Gastrointestinal  Gastrointestinal (WDL): Exceptions to WDL  Anorexia: None  Constipation: Occasional or intermittent symptoms, occasional use of stool softeners, laxatives, dietary modification, or enema(stool softeners managing)  Diarrhea: None  Dysphagia: None  Esophagitis: None  Nausea: None  Pharyngitis: None  Vomiting: None  Dysgeusia: None  Dry Mouth: None  Genitourinary  Genitourinary (WDL): All genitourinary elements are within defined limits  Lymphatic  Lymph (WDL): All lymph disorder elements are within defined limits  Musculoskeletal and Connective Tissue  Musculoskeletal and Connetive Tissue Disorders (WDL): All Musculoskeletal and Connetive Tissue Disorder elements are within defined limits  Integumentary  Integumentary (WDL): All integumentary elements are within defined limits  Patient Coping  Patient Coping: Accepting;Open/discussion  Distress Assessment  Distress Assessment Score: 4(visit today)  Accompanied by  Accompanied by: Family Member(wife, Louisa)  Oral Chemo Adherence         Past History  Past Medical History:   Diagnosis Date     Adenocarcinoma of esophagus (H) 8/9/2019     Family history of myocardial infarction     mom 50's     GERD (gastroesophageal reflux disease)      High cholesterol      History of transfusion      Parkinson disease (H)        Physical Exam    Recent Vitals 9/19/2019   Weight 199 lbs 3 oz   BSA (m2) 2.16 m2   BP 88/49   Pulse 73   Temp 98.1   Temp src 1   SpO2 95   Some recent data might be hidden     General: alert, awake, not in acute distress  HEENT: Head: Normal, normocephalic, atraumatic.  Eye: Normal external eye, conjunctiva, lids cornea, MNIH.  Ears:  Non-tender.  Nose: Normal external nose, mucus membranes and septum.  Pharynx: Dental Hygiene adequate. Normal buccal mucosa. Normal pharynx.  Neck / Thyroid: Supple, no masses, nodes, nodules or  enlargement.  Lymphatics: No abnormally enlarged lymph nodes.  Chest: Normal chest wall and respirations. Clear to auscultation.  Heart: S1 S2 RRR, no murmur.   Abdomen: abdomen is soft without significant tenderness, masses, organomegaly or guarding  Extremities: normal strength, tone, and muscle mass  Skin: normal. no rash or abnormalities  CNS: non focal.    Lab Results    No results found for this or any previous visit (from the past 168 hour(s)).    Imaging    Xr C Arm Less Than One Hour    Result Date: 8/26/2019  Please see Operative or Procedure Note for details on this exam or Radiology Report for body part of interest (if applicable).     Xr Chest 1 View    Result Date: 8/26/2019  EXAM: XR CHEST 1 VIEW LOCATION: [facility] DATE/TIME: 8/26/2019 2:43 PM INDICATION: Malignant neoplasm of esophagus, unspecified. Post portacatheter placement. COMPARISON: CT 8/7/2019. FINDINGS: Portacatheter tip at the distal SVC. No pneumothorax or pleural effusion.     Nm Pet Ct Skull To Mid Thigh    Result Date: 8/21/2019  EXAM: NM PET CT SKULL TO MID THIGH LOCATION: [facility] DATE/TIME: 8/21/2019 11:30 AM INDICATION: Initial treatment strategy for staging malignant neoplasm of lower third of esophagus COMPARISON: CT 08/07/2019 TECHNIQUE: Serum glucose level 98 mg/dL. One hour post intravenous administration of 10.9 mCi F-18 FDG, PET imaging was performed from the skull base to the mid thighs utilizing attenuation correction with concurrent axial CT and PET/CT image fusion. Dose reduction techniques were used. FINDINGS: Extremely FDG avid (SUVmax 26.0) annular wall thickening in the distal esophagus extends for about 4 cm to the gastroesophageal junction. FDG avid paraesophageal and gastrohepatic ligament lymphadenopathy. The gastrohepatic ligament node, as an  example, measures 1.2 x 0.7 cm (SUVmax 3.2). FDG activity in the remainder of the body is normal. Moderate senescent intracranial changes. Moderate  calcified atherosclerosis, including coronary disease and/or stents. Hypoattenuation of blood pool relative to myocardium, suggesting anemia. Tiny nonobstructing bilateral kidney stones. Bilateral kidney cysts. Liver cysts. Mildly enlarged prostate. Pelvic phleboliths. Moderate hypertrophic change throughout the spine.     CONCLUSION: Findings consistent with distal esophageal carcinoma with paraesophageal and gastrohepatic ligament lymph node metastases above the level of the celiac axis. No evidence of distant metastases.      Signed by: Spencer Cox MD

## 2021-06-01 NOTE — PROGRESS NOTES
RADIATION ONCOLOGY WEEKLY TREATMENT VISIT NOTE      Assessment:     1. 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 Concurrent radiation with carboplatin/paclitaxel.    1. Continue radiation treatment as prescribed.  2. Low blood pressures noted earlier in med onc clinic this morning  at 76/45. Asymptomatic at that time.  Able to receive chemotherapy and fluids. Now he is 118/64. Currently not on any BP medication and unclear cause. Will continue BP observation.     Radiation: Site: Esophagus  Stereotactic Radiosurgery: No  Concurrent Therapy: Yes (carbo/taxol)  Today's Dose: 180  Total Dose for Thoracic: 5040  Today's Fraction/Total Fraction Thoracic: 1/28      Subjective:      HPI: Avi Villafana is a 79 y.o. male with    1. 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    Assessment                  Body Site: Thoracic Site: Esophagus  Stereotactic Radiosurgery: No  Concurrent Therapy: Yes(carbo/taxol)  Today's Dose: 180  Total Dose for Thoracic: 5040  Today's Fraction/Total Fraction Thoracic: 1/28  Voice Chances/Stridor/Larynx: 0: Normal  Pharynx and Esphogaus: 0: No change over baseline  Constipation: 0: None  Diarrhea W/O Colostomy: 0: None  Cough: 0: Absent  Dyspnea: 0: Normal                                               Sexuality Alteration                 Emotional Alteration Copin: Effective  Comfort Alteration KPS: 100 % Normal, no complaints  Fatigue (ONS scale) : 0: No Fatigue  Pain Location: denies   Nutrition Alteration Anorexia: 0: None  Nausea: 0: None  Vomitin: None  Dyspepsia and/or Heartburn: 0: None  Pharynx and Esphogaus: 0: No change over baseline  Skin Alteration Skin Sensation: 0: No problem  Skin Reaction: 0: None  AUA Assessment                                  Accompanied by       Objective:     Exam:     Vitals:    19 1254   BP: 118/64   Pulse: 75   Temp: 97.8  F (36.6  C)   TempSrc: Oral   SpO2: 99%   Weight: 201 lb 1.6 oz (91.2 kg)       Wt Readings from Last 8 Encounters:   19 201 lb 1.6 oz (91.2 kg)   19 199 lb 11.2 oz (90.6 kg)   19 192 lb 3.2 oz (87.2 kg)   19 194 lb (88 kg)   19 204 lb (92.5 kg)   08/15/19 198 lb (89.8 kg)   19 198 lb 4.8 oz (89.9 kg)   19 190 lb (86.2 kg)       General: Alert and oriented, in no acute distress  Avi has no Erythema.    Treatment Summary to Date    Aria chart and setup information reviewed    IGena MD personally performed the services described in this documentation, as scribed by Parish Gupta in my presence, and it is both accurate and complete.    Signed by: Gena Cowan MD, MPH

## 2021-06-02 NOTE — PROGRESS NOTES
Pt arrived at cancer care clinic to see Dr. Valle. Pt has Esophageal Cancer and was added on for poss dehydration.

## 2021-06-02 NOTE — TELEPHONE ENCOUNTER
Called patient for routine follow up call s/p radiation for his esophageal cancer.  Left message with call back number should patient have questions or concerns.  Patient needs a 4-6 week follow up with Dr. Cowan scheduled.  Appears patient is seeing medical oncology today in clinic.  Called medical oncology scheduling to see if they could help patient schedule his radiation follow up before he leaves.

## 2021-06-02 NOTE — PROGRESS NOTES
Pt here today for IVF for low BP. 1L NS given, BP rechecked, it was 96/56 sitting, 81/48 standing. MD notified. 1 additional L NS given per orders. BP rechecked and is was WDL. He left clinic ambulatory with wife.

## 2021-06-02 NOTE — PROGRESS NOTES
Patient here ambulatory for final treatment per radiation therapy staff.  Therapist stated that patient was feeling slightly more dizzy today and was encouraged to drink plenty of fluids to prevent dehydration.    Written discharge instructions given to patient and wife by the therapist along with call back number for any questions or concerns.

## 2021-06-02 NOTE — PROGRESS NOTES
[facility] Hematology and Oncology Progress Note    Patient: Avi Villafana  MRN: [id number]  Date of Service: 10/03/2019        Reason for Visit    Chief Complaint   Patient presents with     HE Cancer     Esophageal Cancer       Assessment and Plan  Cancer Staging  Adenocarcinoma of esophagus (H)  Staging form: Esophagus - Adenocarcinoma, AJCC 8th Edition  - Clinical stage from 8/23/2019: Stage III (cT2, cN1, cM0, G2) - Signed by Gena Cowan MD on 8/23/2019      ECOG Performance   ECOG Performance Status: 0     Distress Assessment  Distress Assessment Score: 2    Pain  Currently in Pain: Yes  Pain Score (Initial OR Reassessment): 2  Location: abd    #. cT2 cN1 M0 adenocarcinoma of the lower third of esophagus (GE junction).  Currently undergoing chemoradiation.   Is clinically okay.  Labs were reviewed and they are adequate.  Platelet count is getting lower but still adequate for ongoing chemotherapy.  Will receive day 29 chemotherapy today without dose reduction.     Radiation is planned to complete on 10/14/2019.  He will probably get 1 more dose of chemotherapy next week.     Follow-up in 1 week with labs, provider visit and infusion appointment per protocol.     #.  Normocytic anemia with prior iron deficiency anemia and partly from chemotherapy.   Hemoglobin is above 10 g/dL today.  He received 2 doses of Feraheme in September 2019 and likely it helped.  Continue to monitor at this point.  No need for transfusion.      #.  Scattered small macular rash on both upper extremity.  Unclear etiology.   I explained to her that it is possibly from chemotherapy.  Currently asymptomatic, therefore I recommended to continue to monitor.  If the rash is spreading, we will need to consider biopsy of the skin rash.  Encouraged him to moisturize his skin as much as possible.     #.  Weight loss   Advised him to increase Ensure from 1 to 2 cans a day and ongoing nutritional support and he enjoy chocolate  milkshake and milk.  Not able to eat solid food much as expected.  He also had understanding of feeding tube for nutrition supplement if indicated.    #.  Relative hypotension.  Overall stable.   I suspect it is related to his Parkinson.  Asymptomatic with occasional dizziness.       Problem List    1. Encounter for antineoplastic chemotherapy  CC OFFICE VISIT LONG    CC OFFICE VISIT LONG    CC OFFICE VISIT LONG    Infusion Appointment    DISCONTINUED: sodium chloride 0.9% 250 mL infusion    DISCONTINUED: ondansetron injection 8 mg (ZOFRAN)    DISCONTINUED: dexamethasone injection 10 mg (DECADRON)    DISCONTINUED: diphenhydrAMINE injection 25 mg (BENADRYL)    DISCONTINUED: famotidine 20 mg/2 mL injection 20 mg (PEPCID)    DISCONTINUED: PACLitaxel 108 mg in NaCl 0.9% (non-PVC) 250 mL (TAXOL)    DISCONTINUED: CARBOplatin 210 mg in dextrose 5% 250 mL chemo (PARAPLATIN)    DISCONTINUED: sodium chloride flush 20 mL (NS)    DISCONTINUED: heparin lockflush (PF) porcine 300-600 Units    DISCONTINUED: diphenhydrAMINE injection 50 mg (BENADRYL)    DISCONTINUED: famotidine 20 mg/2 mL injection 20 mg (PEPCID)    DISCONTINUED: hydrocortisone sod succ (PF) injection 100 mg    DISCONTINUED: acetaminophen tablet 1,000 mg (TYLENOL)    DISCONTINUED: sodium chloride 0.9% 500 mL   2. Adenocarcinoma of esophagus (H)  CC OFFICE VISIT LONG    CC OFFICE VISIT LONG    CC OFFICE VISIT LONG    Infusion Appointment    DISCONTINUED: sodium chloride 0.9% 250 mL infusion    DISCONTINUED: ondansetron injection 8 mg (ZOFRAN)    DISCONTINUED: dexamethasone injection 10 mg (DECADRON)    DISCONTINUED: diphenhydrAMINE injection 25 mg (BENADRYL)    DISCONTINUED: famotidine 20 mg/2 mL injection 20 mg (PEPCID)    DISCONTINUED: PACLitaxel 108 mg in NaCl 0.9% (non-PVC) 250 mL (TAXOL)    DISCONTINUED: CARBOplatin 210 mg in dextrose 5% 250 mL chemo (PARAPLATIN)    DISCONTINUED: sodium chloride flush 20 mL (NS)    DISCONTINUED: heparin lockflush (PF) porcine  300-600 Units    DISCONTINUED: diphenhydrAMINE injection 50 mg (BENADRYL)    DISCONTINUED: famotidine 20 mg/2 mL injection 20 mg (PEPCID)    DISCONTINUED: hydrocortisone sod succ (PF) injection 100 mg    DISCONTINUED: acetaminophen tablet 1,000 mg (TYLENOL)    DISCONTINUED: sodium chloride 0.9% 500 mL      ______________________________________________________________________________  Diagnosis  2019-admitted to bone invasive moderately differentiated adenocarcinoma of GE junction.  EUS findin mm in maximal thickness, T2.  Celiac axis was normal.  Multiple benign cysts in the liver.  A few regional lymph nodes were suspicious and 1 of the gastrohepatic lymph node was positive for metastatic adenocarcinoma.   -2019-PET scan showed distal esophageal carcinoma with paraesophageal and gastrohepatic ligament lymph node metastases above the level of celiac axis.  No evidence of distant metastases.   - no loss of MMR. HER2- negative. PDL1 CPS 2     Treatment to date  2019-concurrent chemotherapy and radiation (carboplatin/paclitaxel)    History of Present Illness    Avi presents today accompanied by his wife, Louisa.  He he has lost additional 1 pound from last week, however overall stabilize.  He has occasional cough.  He had abdominal cramping and had one soft stool this morning.  He has been drinking fluids okay and using drip drop.  Noted new rash on the upper extremity for about a week and a half.  No itch no pain.  He did not see any rash anywhere else on his body.    Pain Status  Currently in Pain: Yes    Review of Systems    Oncology Nurse Assessment/CMA Intake: Constitutional  Constitutional (WDL): Exceptions to WDL  Fatigue: Fatigue relieved by rest  Fever: None  Chills: None  Weight Gain: None  Weight Loss: to <10% from baseline, intervention not indicated(1# 19)  Neurosensory  Neurosensory (WDL): Exceptions to WDL  Peripheral Motor Neuropathy: None  Ataxia: None  Peripheral Sensory  Neuropathy: None  Confusion: None  Syncope: None  Eye   Eye Disorder (WDL): Exceptions to WDL  Blurred Vision: Intervention not indicated(had 1 hour episode of blurred vision-had been on ipad)  Dry Eye: None  Eye Pain: None  Watering Eyes: None  Ear  Ear Disorder (WDL): All ear disorder elements are within defined limits  Cardiovascular  Cardiovascular (WDL): All cardiovascular elements are within defined limits  Pulmonary  Respiratory (WDL): Exceptions to WDL  Cough: Mild symptoms, nonprescription intervention indicated(occ, increased phlegm)  Dyspnea: None  Hypoxia: None  Gastrointestinal  Gastrointestinal (WDL): Exceptions to WDL  Anorexia: None  Constipation: None  Diarrhea: Increase of <4 stools per day over baseline, mild increase in ostomy output compared to baseline(1 episode-loose)  Dysphagia: Symptomatic, able to eat regular diet(burning)  Esophagitis: Asymptomatic, clinical or diagnostic observations only, intervention not indicated(burning)  Nausea: Loss of appetite without alteration in eating habits  Pharyngitis: None  Vomiting: None  Dysgeusia: Altered taste but no change in diet  Dry Mouth: None  Genitourinary  Genitourinary (WDL): All genitourinary elements are within defined limits  Lymphatic  Lymph (WDL): All lymph disorder elements are within defined limits  Musculoskeletal and Connective Tissue  Musculoskeletal and Connetive Tissue Disorders (WDL): All Musculoskeletal and Connetive Tissue Disorder elements are within defined limits  Integumentary  Integumentary (WDL): Exceptions to WDL  Alopecia: Hair loss of up to 50% of normal for that individual that is not obvious from a distance but only on close inspection, a different hair style may be required to cover the hair loss but it does not require a wig or hair piece to camouflage  Rash Maculo-Papular: Macules/papules covering <10% BSA with or without symptoms (e.g., pruritus, burning, tightness)(arms)  Pruritus: Mild or localized, topical  intervention indicated(arms)  Urticaria: None  Palmar-Plantar Erythrodysesthesia Syndrome: None  Flushing: None  Patient Coping  Patient Coping: Accepting  Distress Assessment  Distress Assessment Score: 2  Accompanied by  Accompanied by: Family Member(Wife, Louisa)  Oral Chemo Adherence         Past History  Past Medical History:   Diagnosis Date     Adenocarcinoma of esophagus (H) 8/9/2019     Family history of myocardial infarction     mom 50's     GERD (gastroesophageal reflux disease)      High cholesterol      History of transfusion      Parkinson disease (H)        Physical Exam    Recent Vitals 10/3/2019   Weight 192 lbs 14 oz   BSA (m2) 2.12 m2   /64   Pulse 76   Temp 97.6   Temp src 1   SpO2 100   Some recent data might be hidden     General: alert, awake, not in acute distress  HEENT: Head: Normal, normocephalic, atraumatic.  Eye: Normal external eye, conjunctiva, lids cornea, MINH.  Ears:  Non-tender.  Nose: Normal external nose, mucus membranes and septum.  Pharynx: Dental Hygiene adequate. Normal buccal mucosa. Normal pharynx.  Neck / Thyroid: Supple, no masses, nodes, nodules or enlargement.  Lymphatics: No abnormally enlarged lymph nodes.  Chest: Normal chest wall and respirations. Clear to auscultation.  Heart: S1 S2 RRR, no murmur.   Abdomen: abdomen is soft without significant tenderness, masses, organomegaly or guarding  Extremities: normal strength, tone, and muscle mass  Skin: Scattered erythematous non-blanchable rash only on bilateral upper extremity.    CNS: non focal.    Lab Results    Recent Results (from the past 168 hour(s))   Comprehensive Metabolic Panel   Result Value Ref Range    Sodium 137 136 - 145 mmol/L    Potassium 4.1 3.5 - 5.0 mmol/L    Chloride 107 98 - 107 mmol/L    CO2 24 22 - 31 mmol/L    Anion Gap, Calculation 6 5 - 18 mmol/L    Glucose 118 70 - 125 mg/dL    BUN 12 8 - 28 mg/dL    Creatinine 0.98 0.70 - 1.30 mg/dL    GFR MDRD Af Amer >60 >60 mL/min/1.73m2    GFR  MDRD Non Af Amer >60 >60 mL/min/1.73m2    Bilirubin, Total 0.7 0.0 - 1.0 mg/dL    Calcium 8.8 8.5 - 10.5 mg/dL    Protein, Total 6.1 6.0 - 8.0 g/dL    Albumin 3.5 3.5 - 5.0 g/dL    Alkaline Phosphatase 45 45 - 120 U/L    AST 13 0 - 40 U/L    ALT <9 0 - 45 U/L   HM1 (CBC with Diff)   Result Value Ref Range    WBC 2.4 (L) 4.0 - 11.0 thou/uL    RBC 4.05 (L) 4.40 - 6.20 mill/uL    Hemoglobin 10.5 (L) 14.0 - 18.0 g/dL    Hematocrit 34.4 (L) 40.0 - 54.0 %    MCV 85 80 - 100 fL    MCH 25.9 (L) 27.0 - 34.0 pg    MCHC 30.5 (L) 32.0 - 36.0 g/dL    RDW 23.3 (H) 11.0 - 14.5 %    Platelets 109 (L) 140 - 440 thou/uL    MPV 11.0 8.5 - 12.5 fL    Neutrophils % 78 (H) 50 - 70 %    Lymphocytes % 13 (L) 20 - 40 %    Monocytes % 7 2 - 10 %    Eosinophils % 1 0 - 6 %    Basophils % 0 0 - 2 %    Neutrophils Absolute 1.9 (L) 2.0 - 7.7 thou/uL    Lymphocytes Absolute 0.3 (L) 0.8 - 4.4 thou/uL    Monocytes Absolute 0.2 0.0 - 0.9 thou/uL    Eosinophils Absolute 0.0 0.0 - 0.4 thou/uL    Basophils Absolute 0.0 0.0 - 0.2 thou/uL       Imaging    No results found.    Signed by: Spencer Cox MD

## 2021-06-02 NOTE — PROGRESS NOTES
RADIATION ONCOLOGY WEEKLY TREATMENT VISIT NOTE      Assessment:     1. 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 Concurrent radiation with carboplatin/paclitaxel. Scheduled to finish 5040 cGy/28 fx on 10/14/2019.     1. Continue radiation treatment as prescribed.  2. Some nausea lately which is relieved by Zofran.  3. One episode of diarrhea today. Continue stool softener, will adjust as necessary as patient chemotherapy + zofran are constipating agents.   4. Weight stable, adequate caloric intake.   5.  systolic, lower than baseline 120, no dizziness today.  6. Scattered raised areas on bilateral forearms for past ~10 days, minimal itchiness. No new drugs, soaps, or other possible new irritants.      Radiation: Site: Esophagus  Stereotactic Radiosurgery: No  Concurrent Therapy: Yes  Today's Dose: 3780  Total Dose for Thoracic: 5040  Today's Fraction/Total Fraction Thoracic: 21/28      Subjective:      HPI: Avi Villafana is a 79 y.o. male with    1. 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    Assessment                  Body Site: Thoracic Site: Esophagus  Stereotactic Radiosurgery: No  Concurrent Therapy: Yes  Today's Dose: 3780  Total Dose for Thoracic: 5040  Today's Fraction/Total Fraction Thoracic: 21/28  Voice  Chances/Stridor/Larynx: 0: Normal  Pharynx and Esphogaus: 1: Tolerates regular diet  Constipation: 0: None  Diarrhea W/O Colostomy: 0: None  Cough: 0: Absent  Dyspnea: 0: Normal                                              Sexuality Alteration                 Emotional Alteration Copin: Effective  Comfort Alteration KPS: 100 % Normal, no complaints  Fatigue (ONS scale) : 0: No Fatigue  Pain Location: Sore throat  Pain Intensity. Rate degree of pain ranging from 0 (no pain) to 10 (severe pain) : 0-2  Pain Description: Burning - Burning, hot, fire type pain  Pain Intervention: 0: None   Nutrition Alteration Nausea: 1: Able to eat(Zofran with good management.)  Vomitin: None  Dyspepsia and/or Heartburn: 1: Mild  Pharynx and Esphogaus: 1: Tolerates regular diet  Skin Alteration Skin Sensation: 0: No problem  Skin Reaction: 0: None  AUA Assessment                                  Accompanied by       Objective:     Exam:     Vitals:    10/03/19 1106   BP: 100/64   Pulse: 76   Temp: 97.6  F (36.4  C)   TempSrc: Oral   SpO2: 100%   Weight: 192 lb 14.4 oz (87.5 kg)       Wt Readings from Last 8 Encounters:   10/03/19 192 lb 14.4 oz (87.5 kg)   10/03/19 192 lb 14.4 oz (87.5 kg)   19 193 lb (87.5 kg)   19 194 lb 14.4 oz (88.4 kg)   19 194 lb 11.2 oz (88.3 kg)   19 199 lb 14.4 oz (90.7 kg)   19 199 lb 3.2 oz (90.4 kg)   19 198 lb 6.4 oz (90 kg)       General: Alert and oriented, in no acute distress  Avi has no Erythema.    Treatment Summary to Date    Aria chart and setup information reviewed    I, Gena Cowan MD personally performed the services described in this documentation, as scribed by Parish Gupta in my presence, and it is both accurate and complete.    Signed by: Gena Cowan MD, MPH

## 2021-06-02 NOTE — PROGRESS NOTES
RADIATION ONCOLOGY WEEKLY TREATMENT VISIT NOTE      Assessment:     1. 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 Concurrent radiation with carboplatin/paclitaxel. Scheduled to finish 5040 cGy/28 fx on 10/14/2019.     1. Continue radiation treatment as prescribed.  2. Follow up in 4-6 weeks after completion of radiation therapy.     Radiation: Site: Esophagus  Stereotactic Radiosurgery: No  Concurrent Therapy: Yes (carbo/taxol)  Today's Dose: 4680  Total Dose for Thoracic: 5040  Today's Fraction/Total Fraction Thoracic: 26/28      Subjective:      Radiation Treatment Summary    HISTORY: Avi Villafana is a 79 y.o. male who was treated with radiation therapy for lower esophageal adenocarcinoma.      The patient originally presented to the ED on 8/8/019 with light headedness and one episode of black stools. During his evaluation his Hgb returned at 8 which was down from baseline 16,  was given 1 unit, and discharged. An EGD was performed which showed a 3 cm mass a the posterior cardia, extending up the distal esophagus, biopsy was performed which returned with moderately differentiated adenocarcinoma. Further evaluation with CT CAP w oral/IV contrast showed focal wall thickening of the distal GE junction and a few 8mm and smaller LNs in the far near the distal esophagus and GE junction range, otherwise no evidence of distant metastatic disease.      An EUS FNA was performed on 8/16/2019, an  gastrohepatic LN, 8 x 6 mm, returned with metastatic adenocarcinoma. There were  three smaller LNs deep to the tumor which were inaccessible to biopsy. PET CT on 2019 consistent with distal esophageal carcinoma with paraesophageal and gastrohepatic ligament LN metastases above the level of the celiac axis.     SITE TREATED: lower esophagus  TOTAL DOSE: 5040 cGy  NUMBER OF FRACTIONS: 28  DATES COMPLETED: 10/14/2019  CONCURRENT CHEMOTHERAPY: Yes  ADJUVANT THERAPY:Yes    He tolerated the treatment without unexpected side effects.       The following portions of the patient's history were reviewed and updated as appropriate: allergies, current medications, past family history, past medical history, past social history, past surgical history and problem list.    Assessment                  Body Site: Thoracic Site: Esophagus  Stereotactic Radiosurgery: No  Concurrent Therapy: Yes(carbo/taxol)  Today's Dose: 4680  Total Dose for Thoracic: 5040  Today's Fraction/Total Fraction Thoracic:   Voice Chances/Stridor/Larynx: 0: Normal  Pharynx and Esphogaus: 1: Tolerates regular diet  Constipation: 0: None  Diarrhea W/O Colostomy: 0: None  Cough: 0: Absent  Dyspnea: 0: Normal                                              Sexuality Alteration                 Emotional Alteration Copin: Effective  Comfort Alteration KPS: 100 % Normal, no complaints  Fatigue (ONS scale) : 2: Mild Fatigue  Pain Location: sore throat  Pain Intensity. Rate degree of pain ranging from 0 (no pain) to 10 (severe pain) : 0-8  Pain Description: Burning - Burning, hot, fire type pain  Pain Intervention: 4: Adjunctant medications(compazine helps with symptoms)  Effectiveness of pain intervention: 4: Pain relieved 100%   Nutrition Alteration Anorexia: 1: Loss of appetite  Nausea: 1: Able to eat(compazine helps)  Vomitin: None  Dyspepsia and/or Heartburn: 1: Mild  Pharynx and Esphogaus: 1: Tolerates regular diet  Skin Alteration Skin Sensation: 0: No problem  Skin Reaction: 0: None  AUA Assessment                                   Accompanied by       Objective:     Exam:     Vitals:    10/10/19 1105   BP: 108/53   Pulse: 79   Temp: 97.8  F (36.6  C)   TempSrc: Oral   SpO2: 98%   Weight: 193 lb 1.6 oz (87.6 kg)       Wt Readings from Last 8 Encounters:   10/10/19 193 lb 1.6 oz (87.6 kg)   10/10/19 192 lb 8 oz (87.3 kg)   10/03/19 192 lb 14.4 oz (87.5 kg)   10/03/19 192 lb 14.4 oz (87.5 kg)   09/30/19 193 lb (87.5 kg)   09/26/19 194 lb 14.4 oz (88.4 kg)   09/26/19 194 lb 11.2 oz (88.3 kg)   09/19/19 199 lb 14.4 oz (90.7 kg)       General: Alert and oriented, in no acute distress  Avi has no Erythema.    Treatment Summary to Date    Aria chart and setup information reviewed    Aj Patel MD

## 2021-06-02 NOTE — PROGRESS NOTES
[facility] Hematology and Oncology Progress Note    Patient: Avi Villafana  MRN: [id number]  Date of Service: 10/24/2019        Reason for Visit:    Follow up nearly 2 weeks post completion definitive carboplatin + Taxol radiosensitizing chemotherapy    Assessment and Plan:    Cancer Staging    1. 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  PDL-1 pending.    79 y.o.     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   with 2 children.  Quit smoking in 1983.  Drinks alcohol ~3 times/week.    Personal history of Parkinson disease diagnosed about a year ago.  Tremors mainly in his hands and currently well controlled with Sinemet.  Normal gait.  Denies any chronic pain or sensory changes in his extremities.    08/06/19 - presented with lightheadedness and black stools with hemoglobin of 7.7 g/dL (baseline hemoglobin around 16 g/dL).      08/07/19 CT CAP without oral with IV contrast showed a focal wall thickening of the distal esophagus, a few 8 mm and smaller lymph nodes in the fat near the distal esophagus and GE junction.  No evidence of distant metastases.    08/07/19 EGD by Dr. France showed a 3 cm mass at the posterior cardia, extending up distal esophagus.  Biopsy confirmed moderately differentiated adenocarcinoma.    08/07/19 GE juction mass biopsy - c/w invasive moderately differentiated BETITO.    08/16/19 EUS showed hypoechoic mass at the GE junction, 14 mm in maximal thickness, T2 (into but not through the muscularis per prior).  Celiac axis was normal.  Multiple benign cysts in the liver.  3 small lymph nodes were seen deep to the tumor which were not assessable for biopsy.  An 8 x 6 mm gastrohepatic lymph node FNA:      Cytology c/w metastatic adenocarcinoma.     08/21/19 NM PET - distal esophageal carcinoma with paraesophageal and gastrohepatic ligament  lymph node metastases above the level of the celiac axis. No evidence of distant metastases.    Chemoradiation therapy (either neoadjvuant or definitive) was recommended.      08/26/19 - port-a-cath placed by Dr. Alexandra.     09/05 - 10/14/19 completed 6 weekly carboplatin + paclitaxel chemotherapy treatments concurrent with 5040 cGy/28 fx EBRT.      10/24/19:    CBC - WBC 3.0.  ANC and Plts WNL.  HgB stable @ 10.3.    CMP - mild hypoalbuminemia.  basically WNL.    Encouraged increased protein through diet or supplements.    Avi presents in good spirits accompanied by his spouse.  He looks and feels quite good.  He continues to note mild esophagitis at times effectively managed with Maalox 2-3 times daily.  He also takes omeprazole twice daily for the same.  He has a fair appetite and states that he is eating and taking oral fluids well.  His weight dropped about 10 pounds since starting chemotherapy, but is now slowly increasing again.  Rare, mild nausea well managed with rare antiemetics.  Persistent, occasional orthostatic light headedness.    Looking and doing well barely 2 weeks out from definitive chem/radiation therapy.    Continue Maalox 2-3 times daily, 2-tabs XS Tylenol 2-3 times daily and Omeprazole twice daily for esophageal complaints.    Low BP:    Not orthostatic.  Not tachycardic.    Likely debilitation-related.    I reviewed pausing when changing positions before walking to allow his BP to acclimate to prevent falls and encouraged pushing oral fluids.     Requests and will receive the 2019-20 flu vaccine.    11/14/19 - follow up with HEME1 and BMP.  Patient may cancel if doing well.    12/12/19 - 8 week f/u post completing definitive chemoradiation therapy with HEME1, CMP and NM PET.    Treatment goal is for cure.      NM PET about 8 weeks from completion of radiation (first or second week of December).                  2. Iron deficiency anemia:    Secondary to blood loss from GE junction  ulcerated mass    Presented with Hgb 7.7 g/dl in early August 2019.     08/08 and 08/10/19 transfused with 1 u pRBCs.    08/06/19 iron studies - iron deficiency.     09/05 and 09/12/19 - received 510 mg IV Feraheme.      Current Hgb @ 10.3 g/dl.  MCV 87.     ECOG Performance:     ECOG Performance Status: 1    Distress Assessment:    Distress Assessment Score: 5    Pain:  Pain Score (Initial OR Reassessment): No/Denies Pain        TT > 25 minutes face to face with > 50% counseling and care coordination.    Juancho Morales, CNP      CC: Tad Tovar MD  ____________________________________________________    Interim History:    Mr. Avi Villafana presents accompanied by his spouse nearly 2 weeks post completion definitive carboplatin + Taxol radiosensitizing chemotherapy.  He looks and feels quite good.  He is eating well and notes only rare nausea.  He denies cough, fever, chills, unusual headaches, visual or mentation disturbance bowel or bladder issues, rash.  His appetite, weight and performance status are quite stable.      Pain Status:    Currently in Pain: No/denies    Review of Systems:    Constitutional  Constitutional (WDL): Exceptions to WDL  Fatigue: Fatigue relieved by rest  Neurosensory  Neurosensory (WDL): Exceptions to WDL  Peripheral Motor Neuropathy: Asymptomatic, clinical or diagnostic observations only, intervention not indicated  Ataxia: Asymptomatic, clinical or diagnostic observations only, intervention not indicated  Peripheral Sensory Neuropathy: Asymptomatic, loss of deep tendon reflexes or paresthesia  Cardiovascular  Cardiovascular (WDL): All cardiovascular elements are within defined limits  Edema: No  Pulmonary  Respiratory (WDL): Exceptions to WDL  Cough: Mild symptoms, nonprescription intervention indicated(phlegm clear)  Dyspnea: Shortness of breath with moderate exertion  Gastrointestinal  Gastrointestinal (WDL): Exceptions to WDL  Anorexia: Loss of appetite without alteration in  eating habits  Dysphagia: Symptomatic, able to eat regular diet  Esophagitis: Asymptomatic, clinical or diagnostic observations only, intervention not indicated(using maalox for epigastric pain and it helps)  Nausea: Loss of appetite without alteration in eating habits  Dysgeusia: Altered taste with change in diet (e.g., oral supplements), noxious or unpleasant taste, loss of taste  Dry Mouth: Symptomatic (e.g., dry or thick saliva) without significant dietary alteration, unstimulated saliva flow >0.2 ml/min  Genitourinary  Genitourinary (WDL): All genitourinary elements are within defined limits  Integumentary  Integumentary (WDL): Exceptions to WDL  Alopecia: Hair loss of up to 50% of normal for that individual that is not obvious from a distance but only on close inspection, a different hair style may be required to cover the hair loss but it does not require a wig or hair piece to camouflage  Rash Maculo-Papular: Macules/papules covering <10% BSA with or without symptoms (e.g., pruritus, burning, tightness)(drying up)  Patient Coping  Patient Coping: Accepting;Open/discussion  Distress Assessment  Distress Assessment Score: 5  Accompanied by  Accompanied by: Family Member    Past History:    Past Medical History:   Diagnosis Date     Adenocarcinoma of esophagus (H) 8/9/2019     Family history of myocardial infarction     mom 50's     GERD (gastroesophageal reflux disease)      High cholesterol      History of transfusion      Parkinson disease (H)          Past Surgical History:   Procedure Laterality Date     CARDIAC CATHETERIZATION       ESOPHAGOGASTRODUODENOSCOPY N/A 8/16/2019    Procedure: ENDOSCOPIC ULTRASOUND, FINE NEEDLE ASPIRATION;  Surgeon: Colt France MD;  Location: [facility];  Service: Gastroenterology     HERNIA REPAIR       KIDNEY STONE SURGERY N/A June 2013?     neck fusion N/A      TX ESOPHAGOGASTRODUODENOSCOPY TRANSORAL DIAGNOSTIC N/A 8/7/2019    Procedure:  "ESOPHAGOGASTRODUODENOSCOPY (EGD) with biopsies;  Surgeon: Colt France MD;  Location: [facility];  Service: Gastroenterology     TUNNELED VENOUS CATHETER PLACEMENT N/A 8/26/2019    Procedure: PLACEMENT PORTACATH;  Surgeon: Josiah Alexandra MD;  Location: [facility];  Service: General       Physical Exam:    Recent Vitals 10/24/2019   Height 6' 1\"   Weight 193 lbs 13 oz   BSA (m2) 2.13 m2   /54   Pulse 82   Temp 97.8   Temp src 1   SpO2 93   Some recent data might be hidden       GENERAL:   Pleasant.  Alert and oriented.  Comfortable.  In no acute distress.    HEENT:   Atraumatic and normocephalic.  FAINA.  EOMI.  No pallor.  No icterus.  No mucosal lesions.    LYMPH NODES:  No palpable cervical, axillary or inguinal lymphadenopathy.    CHEST:   Lungs clear.    CVS:    S1 and S2 heard.  Regular rate and rhythm.  No murmur, gallop or rub heard.  No peripheral edema.    ABDOMEN:   Soft.  Not tender.  Not distended.  No palpable hepatomegaly or splenomegaly.    EXTREMITIES:  Warm.  No JOSUÉ.    SKIN:    No rash, bruising or purpura noted.      Lab Results:    Reviewed with patient.    Recent Results (from the past 24 hour(s))   Comprehensive Metabolic Panel   Result Value Ref Range    Sodium 139 136 - 145 mmol/L    Potassium 4.1 3.5 - 5.0 mmol/L    Chloride 102 98 - 107 mmol/L    CO2 26 22 - 31 mmol/L    Anion Gap, Calculation 11 5 - 18 mmol/L    Glucose 116 70 - 125 mg/dL    BUN 12 8 - 28 mg/dL    Creatinine 1.19 0.70 - 1.30 mg/dL    GFR MDRD Af Amer >60 >60 mL/min/1.73m2    GFR MDRD Non Af Amer 59 (L) >60 mL/min/1.73m2    Bilirubin, Total 0.5 0.0 - 1.0 mg/dL    Calcium 8.7 8.5 - 10.5 mg/dL    Protein, Total 5.8 (L) 6.0 - 8.0 g/dL    Albumin 3.3 (L) 3.5 - 5.0 g/dL    Alkaline Phosphatase 51 45 - 120 U/L    AST 16 0 - 40 U/L    ALT <9 0 - 45 U/L   HM1 (CBC with Diff)   Result Value Ref Range    WBC 3.0 (L) 4.0 - 11.0 thou/uL    RBC 3.85 (L) 4.40 - 6.20 mill/uL    Hemoglobin 10.3 (L) 14.0 - " 18.0 g/dL    Hematocrit 33.3 (L) 40.0 - 54.0 %    MCV 87 80 - 100 fL    MCH 26.8 (L) 27.0 - 34.0 pg    MCHC 30.9 (L) 32.0 - 36.0 g/dL    RDW 25.2 (H) 11.0 - 14.5 %    Platelets 149 140 - 440 thou/uL    MPV 10.8 8.5 - 12.5 fL    Neutrophils % 70 50 - 70 %    Lymphocytes % 14 (L) 20 - 40 %    Monocytes % 13 (H) 2 - 10 %    Eosinophils % 1 0 - 6 %    Basophils % 1 0 - 2 %    Neutrophils Absolute 2.1 2.0 - 7.7 thou/uL    Lymphocytes Absolute 0.4 (L) 0.8 - 4.4 thou/uL    Monocytes Absolute 0.4 0.0 - 0.9 thou/uL    Eosinophils Absolute 0.0 0.0 - 0.4 thou/uL    Basophils Absolute 0.0 0.0 - 0.2 thou/uL       Imaging:    No new imaging.

## 2021-06-02 NOTE — PATIENT INSTRUCTIONS - HE
Recent Results (from the past 24 hour(s))   Comprehensive Metabolic Panel   Result Value Ref Range    Sodium 139 136 - 145 mmol/L    Potassium 4.1 3.5 - 5.0 mmol/L    Chloride 102 98 - 107 mmol/L    CO2 26 22 - 31 mmol/L    Anion Gap, Calculation 11 5 - 18 mmol/L    Glucose 116 70 - 125 mg/dL    BUN 12 8 - 28 mg/dL    Creatinine 1.19 0.70 - 1.30 mg/dL    GFR MDRD Af Amer >60 >60 mL/min/1.73m2    GFR MDRD Non Af Amer 59 (L) >60 mL/min/1.73m2    Bilirubin, Total 0.5 0.0 - 1.0 mg/dL    Calcium 8.7 8.5 - 10.5 mg/dL    Protein, Total 5.8 (L) 6.0 - 8.0 g/dL    Albumin 3.3 (L) 3.5 - 5.0 g/dL    Alkaline Phosphatase 51 45 - 120 U/L    AST 16 0 - 40 U/L    ALT <9 0 - 45 U/L   HM1 (CBC with Diff)   Result Value Ref Range    WBC 3.0 (L) 4.0 - 11.0 thou/uL    RBC 3.85 (L) 4.40 - 6.20 mill/uL    Hemoglobin 10.3 (L) 14.0 - 18.0 g/dL    Hematocrit 33.3 (L) 40.0 - 54.0 %    MCV 87 80 - 100 fL    MCH 26.8 (L) 27.0 - 34.0 pg    MCHC 30.9 (L) 32.0 - 36.0 g/dL    RDW 25.2 (H) 11.0 - 14.5 %    Platelets 149 140 - 440 thou/uL    MPV 10.8 8.5 - 12.5 fL    Neutrophils % 70 50 - 70 %    Lymphocytes % 14 (L) 20 - 40 %    Monocytes % 13 (H) 2 - 10 %    Eosinophils % 1 0 - 6 %    Basophils % 1 0 - 2 %    Neutrophils Absolute 2.1 2.0 - 7.7 thou/uL    Lymphocytes Absolute 0.4 (L) 0.8 - 4.4 thou/uL    Monocytes Absolute 0.4 0.0 - 0.9 thou/uL    Eosinophils Absolute 0.0 0.0 - 0.4 thou/uL    Basophils Absolute 0.0 0.0 - 0.2 thou/uL

## 2021-06-02 NOTE — TELEPHONE ENCOUNTER
Pt wife Louisa called. Avi finished his radiation to his esophagus on 10/14. Last taxol and carbo on 10/10. Pt has been sleeping about 10 hours per day. Pt has pain at the bottom of his esophagus and has a very difficult time drinking fluids. Pt gets in about 2 glasses of water, 1 milkshake and 1 protein drink per day. Pt has not had a fever. Does have chills. Pt wife is concerned he is dehydrated. Pt also gets a little short of breath and is coughing up some light tan tinged phlegm which is thick and sometimes makes him gag. Pt does not have any pain medication ordered. Cari Coe RN

## 2021-06-02 NOTE — PROGRESS NOTES
[facility] Hematology and Oncology Progress Note    Patient: Avi Villafana  MRN: [id number]  Date of Service: 10/17/2019        Reason for Visit    Chief Complaint   Patient presents with     HE Cancer       Assessment and Plan  Cancer Staging  Adenocarcinoma of esophagus (H)  Staging form: Esophagus - Adenocarcinoma, AJCC 8th Edition  - Clinical stage from 8/23/2019: Stage III (cT2, cN1, cM0, G2) - Signed by Gena Cowan MD on 8/23/2019      ECOG Performance   ECOG Performance Status: 2     Distress Assessment  Distress Assessment Score: 5    Pain  Currently in Pain: Yes  Pain Score (Initial OR Reassessment): 8  Location: burning with eating      Problem List    1. Malignant neoplasm of lower third of esophagus (H)     2. Pain     3. Dehydration        Dysphagia and hypotension, apparently related to recent chemoradiation.   Patient can take tylenol prior to meals, suggested trial of antacid in addition to current omeprazole, fluids in clinic today, consider EGD if problem persists.  1 liter saline to start, give second liter if systolic BP <90.  ______________________________________________________________________________    History of Present Illness    Add on visit due to concerns about pain with eating and poor oral intake, reported by wife.    His last chemo was a week ago, last radiation was 3 days ago.  Pain has progressed, but is only present when he swallows, any food, any temperature.      He has had some chills at home, but never felt warm / feverish.    He takes nothing, not even tylenol for pain.  No pain unless he's eating.    Pain Status  Currently in Pain: Yes    Review of Systems    Oncology Nurse Assessment/CMA Intake: Constitutional  Constitutional (WDL): Exceptions to WDL  Fatigue: Fatigue not relieved by rest - Limiting instrumental ADL  Chills: Mild sensation of cold, shivering, chattering of teeth  Weight Loss: to <10% from baseline, intervention not  indicated  Neurosensory  Peripheral Motor Neuropathy: Asymptomatic, clinical or diagnostic observations only, intervention not indicated  Ataxia: Asymptomatic, clinical or diagnostic observations only, intervention not indicated  Peripheral Sensory Neuropathy: Asymptomatic, loss of deep tendon reflexes or paresthesia  Eye   Eye Disorder (WDL): All eye disorder elements are within defined limits  Ear  Ear Disorder (WDL): All ear disorder elements are within defined limits  Cardiovascular  Cardiovascular (WDL): All cardiovascular elements are within defined limits  Pulmonary  Respiratory (WDL): Exceptions to WDL  Cough: Mild symptoms, nonprescription intervention indicated  Dyspnea: Shortness of breath with moderate exertion  Gastrointestinal  Gastrointestinal (WDL): Exceptions to WDL  Anorexia: Oral intake altered without significant weight loss or malnutrition, oral nutritional supplements indicated  Dysphagia: Symptomatic and altered eating/swallowing(burning)  Esophagitis: Symptomatic, altered eating/swallowing, oral supplements indicated  Nausea: Loss of appetite without alteration in eating habits  Dysgeusia: Altered taste with change in diet (e.g., oral supplements), noxious or unpleasant taste, loss of taste  Genitourinary  Genitourinary (WDL): All genitourinary elements are within defined limits  Lymphatic  Lymph (WDL): All lymph disorder elements are within defined limits  Musculoskeletal and Connective Tissue  Musculoskeletal and Connetive Tissue Disorders (WDL): Exceptions to WDL  Arthralgia: Mild pain  Muscle Weakness : Symptomatic, perceived by patient but not evident on physical exam  Integumentary  Integumentary (WDL): Exceptions to WDL  Alopecia: Hair loss of up to 50% of normal for that individual that is not obvious from a distance but only on close inspection, a different hair style may be required to cover the hair loss but it does not require a wig or hair piece to camouflage  Rash  Maculo-Papular: Macules/papules covering <10% BSA with or without symptoms (e.g., pruritus, burning, tightness)(arms)  Pruritus: Mild or localized, topical intervention indicated  Patient Coping  Patient Coping: Accepting  Distress Assessment  Distress Assessment Score: 5  Accompanied by  Accompanied by: Family Member  Oral Chemo Adherence         Past History  Past Medical History:   Diagnosis Date     Adenocarcinoma of esophagus (H) 8/9/2019     Family history of myocardial infarction     mom 50's     GERD (gastroesophageal reflux disease)      High cholesterol      History of transfusion      Parkinson disease (H)        Physical Exam    Recent Vitals 10/17/2019   Weight -   BSA (m2) -   BP 80/53   Pulse 88   Temp -   Temp src -   SpO2 -   Some recent data might be hidden       General: alert, appears stated age and cooperative .  Smiling, not toxic or even especially ill appearing for now.  HEENT: Head: Normocephalic, no lesions, without obvious abnormality.  Pharynx: Dental Hygiene adequate. Normal buccal mucosa. Normal pharynx.  Chest: Normal chest wall and respirations. Clear to auscultation.  Cardiac: regular rate and rhythm, S1, S2 normal, no murmur, click, rub or gallop  Abdomen: abdomen is soft without significant tenderness, masses, organomegaly or guarding  Extremities: normal strength, tone, and muscle mass  no deformities  Skin: normal  CNS: normal without focal findings, mental status, speech normal, alert and oriented x3, FAINA and reflexes normal and symmetric  Lymphatics: No abnormally enlarged lymph nodes.    Lab Results    Recent Results (from the past 168 hour(s))   Comprehensive Metabolic Panel   Result Value Ref Range    Sodium 136 136 - 145 mmol/L    Potassium 4.1 3.5 - 5.0 mmol/L    Chloride 106 98 - 107 mmol/L    CO2 25 22 - 31 mmol/L    Anion Gap, Calculation 5 5 - 18 mmol/L    Glucose 102 70 - 125 mg/dL    BUN 10 8 - 28 mg/dL    Creatinine 1.07 0.70 - 1.30 mg/dL    GFR MDRD Af Amer >60  >60 mL/min/1.73m2    GFR MDRD Non Af Amer >60 >60 mL/min/1.73m2    Bilirubin, Total 0.8 0.0 - 1.0 mg/dL    Calcium 8.9 8.5 - 10.5 mg/dL    Protein, Total 6.1 6.0 - 8.0 g/dL    Albumin 3.5 3.5 - 5.0 g/dL    Alkaline Phosphatase 55 45 - 120 U/L    AST 12 0 - 40 U/L    ALT <9 0 - 45 U/L   HM1 (CBC with Diff)   Result Value Ref Range    WBC 1.7 (LL) 4.0 - 11.0 thou/uL    RBC 3.99 (L) 4.40 - 6.20 mill/uL    Hemoglobin 10.6 (L) 14.0 - 18.0 g/dL    Hematocrit 33.7 (L) 40.0 - 54.0 %    MCV 85 80 - 100 fL    MCH 26.6 (L) 27.0 - 34.0 pg    MCHC 31.5 (L) 32.0 - 36.0 g/dL    RDW 24.7 (H) 11.0 - 14.5 %    Platelets 131 (L) 140 - 440 thou/uL    MPV 10.4 8.5 - 12.5 fL    Neutrophils % 70 50 - 70 %    Lymphocytes % 16 (L) 20 - 40 %    Monocytes % 12 (H) 2 - 10 %    Eosinophils % 1 0 - 6 %    Basophils % 1 0 - 2 %    Neutrophils Absolute 1.2 (L) 2.0 - 7.7 thou/uL    Lymphocytes Absolute 0.3 (L) 0.8 - 4.4 thou/uL    Monocytes Absolute 0.2 0.0 - 0.9 thou/uL    Eosinophils Absolute 0.0 0.0 - 0.4 thou/uL    Basophils Absolute 0.0 0.0 - 0.2 thou/uL   Manual Differential   Result Value Ref Range    Platelet Estimate Decreased (!) Normal    Ovalocytes 1+ (!) Negative       Imaging    No results found.      Signed by: Marya Valle MD

## 2021-06-02 NOTE — PROGRESS NOTES
[facility] Hematology and Oncology Progress Note    Patient: Avi Villafana  MRN: [id number]  Date of Service: 10/10/2019        Reason for Visit    No chief complaint on file.      Assessment and Plan  Cancer Staging  Adenocarcinoma of esophagus (H)  Staging form: Esophagus - Adenocarcinoma, AJCC 8th Edition  - Clinical stage from 8/23/2019: Stage III (cT2, cN1, cM0, G2) - Signed by Gena Cowan MD on 8/23/2019      ECOG Performance   ECOG Performance Status: 1     Distress Assessment  Distress Assessment Score: 1    Pain  Currently in Pain: No/denies  Pain Score (Initial OR Reassessment): No/Denies Pain  Location: R ribs    #. cT2 cN1 M0 adenocarcinoma of the lower third of the esophagus (GE junction).  Currently undergoing chemoradiation.   He is clinically okay with expected side effects with current chemotherapy and radiation.  He maintain his weight okay.  Labs were reviewed and they are adequate to proceed with chemotherapy.  He is willing to complete the last cycle of chemotherapy.  He will complete last dose of radiation on 10/14/2019.     Discussed about potentially his symptoms of   Follow-up clinical exam and labs in 2 weeks.     Plan for post treatment response assessment by PET/CT scan about 8 weeks from completion of radiation (first or second week of December).       #.  Mild normocytic anemia with prior iron deficiency anemia and partly from chemotherapy.   He maintain his hemoglobin very well.  He received 2 doses of Feraheme and September 2019.  Continue to monitor at this point.     #.  Mild neutropenia, thrombocytopenia, related to chemotherapy.   No additional intervention necessary at this point.  Expectant recovery after finishing chemotherapy and radiation.    #.  Scattered small macular rash on both upper extremity.  Unclear etiology.   Overall stable.  If they are not improving in the next few weeks, we can consider referring to dermatology for biopsy.  Continue moisturize  skin as much as possible.      #.  Weight loss   He is now stabilized.  He will continue to work on nutrition supplement.      #.  Relative hypotension.  Overall stable.   I suspect it is related to his Parkinson.  Asymptomatic.         #.  Vaccination (shingles and influenza) to delay until the next couple weeks until his blood counts recover.      Problem List    1. Encounter for antineoplastic chemotherapy  [facility] OFFICE VISIT LONG    Infusion Appointment    DISCONTINUED: sodium chloride 0.9% 250 mL infusion    DISCONTINUED: ondansetron injection 8 mg (ZOFRAN)    DISCONTINUED: dexamethasone injection 10 mg (DECADRON)    DISCONTINUED: diphenhydrAMINE injection 25 mg (BENADRYL)    DISCONTINUED: famotidine 20 mg/2 mL injection 20 mg (PEPCID)    DISCONTINUED: PACLitaxel 108 mg in NaCl 0.9% (non-PVC) 250 mL (TAXOL)    DISCONTINUED: CARBOplatin 190 mg in dextrose 5% 250 mL chemo (PARAPLATIN)    DISCONTINUED: sodium chloride flush 20 mL (NS)    DISCONTINUED: heparin lockflush (PF) porcine 300-600 Units    DISCONTINUED: diphenhydrAMINE injection 50 mg (BENADRYL)    DISCONTINUED: famotidine 20 mg/2 mL injection 20 mg (PEPCID)    DISCONTINUED: hydrocortisone sod succ (PF) injection 100 mg    DISCONTINUED: acetaminophen tablet 1,000 mg (TYLENOL)    DISCONTINUED: sodium chloride 0.9% 500 mL   2. Adenocarcinoma of esophagus (H)  CC OFFICE VISIT LONG    Infusion Appointment    DISCONTINUED: sodium chloride 0.9% 250 mL infusion    DISCONTINUED: ondansetron injection 8 mg (ZOFRAN)    DISCONTINUED: dexamethasone injection 10 mg (DECADRON)    DISCONTINUED: diphenhydrAMINE injection 25 mg (BENADRYL)    DISCONTINUED: famotidine 20 mg/2 mL injection 20 mg (PEPCID)    DISCONTINUED: PACLitaxel 108 mg in NaCl 0.9% (non-PVC) 250 mL (TAXOL)    DISCONTINUED: CARBOplatin 190 mg in dextrose 5% 250 mL chemo (PARAPLATIN)    DISCONTINUED: sodium chloride flush 20 mL (NS)    DISCONTINUED: heparin lockflush (PF) porcine 300-600 Units    DISCONTINUED:  diphenhydrAMINE injection 50 mg (BENADRYL)    DISCONTINUED: famotidine 20 mg/2 mL injection 20 mg (PEPCID)    DISCONTINUED: hydrocortisone sod succ (PF) injection 100 mg    DISCONTINUED: acetaminophen tablet 1,000 mg (TYLENOL)    DISCONTINUED: sodium chloride 0.9% 500 mL      ______________________________________________________________________________  Diagnosis  2019-admitted to bone invasive moderately differentiated adenocarcinoma of GE junction.  EUS findin mm in maximal thickness, T2.  Celiac axis was normal.  Multiple benign cysts in the liver.  A few regional lymph nodes were suspicious and 1 of the gastrohepatic lymph node was positive for metastatic adenocarcinoma.   -2019-PET scan showed distal esophageal carcinoma with paraesophageal and gastrohepatic ligament lymph node metastases above the level of celiac axis.  No evidence of distant metastases.   - no loss of MMR. HER2- negative. PDL1 CPS 2     Treatment to date  2019-10/14/2019: concurrent chemotherapy and radiation (carboplatin/paclitaxel)    History of Present Illness    Avi presents today accompanied by his wife.  He has some cold sensitivity in his feet.  They are not painful.  Reported dull pain in the rib cage worse with movement.  They were trimming this drops yesterday.  Poor appetite.  Burning sensation in the esophagus after stop eating solid food.  He is able to continue to push oral fluids.  Denies dizziness or lightheadedness.      Pain Status  Currently in Pain: No/denies    Review of Systems    Oncology Nurse Assessment/CMA Intake: Constitutional  Constitutional (WDL): Exceptions to WDL  Fatigue: Fatigue relieved by rest  Fever: None  Chills: None  Weight Gain: None  Weight Loss: None  Neurosensory  Neurosensory (WDL): Exceptions to WDL  Peripheral Motor Neuropathy: Asymptomatic, clinical or diagnostic observations only, intervention not indicated  Ataxia: Asymptomatic, clinical or diagnostic observations only,  intervention not indicated  Peripheral Sensory Neuropathy: Asymptomatic, loss of deep tendon reflexes or paresthesia(feet feel cold)  Confusion: None  Syncope: None  Eye   Eye Disorder (WDL): All eye disorder elements are within defined limits  Ear  Ear Disorder (WDL): All ear disorder elements are within defined limits  Cardiovascular  Cardiovascular (WDL): Exceptions to WDL  Palpitations: None  Edema: No  Phlebitis: None  Superficial thrombophlebitis: None  Pulmonary  Respiratory (WDL): Exceptions to WDL  Cough: Mild symptoms, nonprescription intervention indicated  Dyspnea: Shortness of breath with moderate exertion  Hypoxia: None  Gastrointestinal  Gastrointestinal (WDL): Exceptions to WDL  Anorexia: Oral intake altered without significant weight loss or malnutrition, oral nutritional supplements indicated  Constipation: None  Diarrhea: Increase of <4 stools per day over baseline, mild increase in ostomy output compared to baseline(1 loose stool today)  Dysphagia: Symptomatic and altered eating/swallowing(burning in esophagus after eating solid foods)  Esophagitis: Symptomatic, altered eating/swallowing, oral supplements indicated(burning)  Nausea: Loss of appetite without alteration in eating habits  Pharyngitis: None  Vomiting: None  Dysgeusia: Altered taste with change in diet (e.g., oral supplements), noxious or unpleasant taste, loss of taste  Dry Mouth: None  Genitourinary  Genitourinary (WDL): All genitourinary elements are within defined limits  Lymphatic  Lymph (WDL): All lymph disorder elements are within defined limits  Musculoskeletal and Connective Tissue  Musculoskeletal and Connetive Tissue Disorders (WDL): Exceptions to WDL  Arthralgia: Mild pain(R ribs)  Bone Pain: Mild pain(R ribs)  Muscle Weakness : Symptomatic, perceived by patient but not evident on physical exam  Myalgia: None  Integumentary  Integumentary (WDL): Exceptions to WDL  Alopecia: Hair loss of up to 50% of normal for that  individual that is not obvious from a distance but only on close inspection, a different hair style may be required to cover the hair loss but it does not require a wig or hair piece to camouflage  Rash Maculo-Papular: Macules/papules covering <10% BSA with or without symptoms (e.g., pruritus, burning, tightness)(arms)  Pruritus: Mild or localized, topical intervention indicated(arms)  Urticaria: None  Palmar-Plantar Erythrodysesthesia Syndrome: None  Flushing: None  Patient Coping  Patient Coping: Accepting;Open/discussion  Distress Assessment  Distress Assessment Score: 1  Accompanied by  Accompanied by: Family Member(Wife, Louisa)  Oral Chemo Adherence         Past History  Past Medical History:   Diagnosis Date     Adenocarcinoma of esophagus (H) 8/9/2019     Family history of myocardial infarction     mom 50's     GERD (gastroesophageal reflux disease)      High cholesterol      History of transfusion      Parkinson disease (H)        Physical Exam    Recent Vitals 10/10/2019   Weight 193 lbs 2 oz   BSA (m2) 2.12 m2   /53   Pulse 79   Temp 97.8   Temp src 1   SpO2 98   Some recent data might be hidden     General: alert, awake, not in acute distress  HEENT: Head: Normal, normocephalic, atraumatic.  Eye: Normal external eye, conjunctiva, lids cornea, MINH.  Ears:  Non-tender.  Nose: Normal external nose, mucus membranes and septum.  Pharynx: Dental Hygiene adequate. Normal buccal mucosa. Normal pharynx.  Neck / Thyroid: Supple, no masses, nodes, nodules or enlargement.  Lymphatics: No abnormally enlarged lymph nodes.  Chest: Normal chest wall and respirations. Clear to auscultation.  Heart: S1 S2 RRR, no murmur.   Abdomen: abdomen is soft without significant tenderness, masses, organomegaly or guarding  Extremities: normal strength, tone, and muscle mass  Skin: Scattered erythematous non-blanchable rash on bilateral upper extremities.  Stable.  CNS: non focal.    Lab Results    Recent Results (from the  past 168 hour(s))   Comprehensive Metabolic Panel   Result Value Ref Range    Sodium 138 136 - 145 mmol/L    Potassium 4.1 3.5 - 5.0 mmol/L    Chloride 108 (H) 98 - 107 mmol/L    CO2 21 (L) 22 - 31 mmol/L    Anion Gap, Calculation 9 5 - 18 mmol/L    Glucose 116 70 - 125 mg/dL    BUN 11 8 - 28 mg/dL    Creatinine 1.12 0.70 - 1.30 mg/dL    GFR MDRD Af Amer >60 >60 mL/min/1.73m2    GFR MDRD Non Af Amer >60 >60 mL/min/1.73m2    Bilirubin, Total 0.7 0.0 - 1.0 mg/dL    Calcium 8.6 8.5 - 10.5 mg/dL    Protein, Total 6.1 6.0 - 8.0 g/dL    Albumin 3.6 3.5 - 5.0 g/dL    Alkaline Phosphatase 51 45 - 120 U/L    AST 11 0 - 40 U/L    ALT <9 0 - 45 U/L   HM1 (CBC with Diff)   Result Value Ref Range    WBC 2.1 (L) 4.0 - 11.0 thou/uL    RBC 3.92 (L) 4.40 - 6.20 mill/uL    Hemoglobin 10.3 (L) 14.0 - 18.0 g/dL    Hematocrit 33.6 (L) 40.0 - 54.0 %    MCV 86 80 - 100 fL    MCH 26.3 (L) 27.0 - 34.0 pg    MCHC 30.7 (L) 32.0 - 36.0 g/dL    RDW 24.0 (H) 11.0 - 14.5 %    Platelets 128 (L) 140 - 440 thou/uL    MPV 10.0 8.5 - 12.5 fL    Neutrophils % 79 (H) 50 - 70 %    Lymphocytes % 10 (L) 20 - 40 %    Monocytes % 9 2 - 10 %    Eosinophils % 1 0 - 6 %    Basophils % 1 0 - 2 %    Neutrophils Absolute 1.7 (L) 2.0 - 7.7 thou/uL    Lymphocytes Absolute 0.2 (L) 0.8 - 4.4 thou/uL    Monocytes Absolute 0.2 0.0 - 0.9 thou/uL    Eosinophils Absolute 0.0 0.0 - 0.4 thou/uL    Basophils Absolute 0.0 0.0 - 0.2 thou/uL       Imaging    No results found.    Signed by: Spencer Cox MD

## 2021-06-03 VITALS
OXYGEN SATURATION: 98 % | SYSTOLIC BLOOD PRESSURE: 108 MMHG | WEIGHT: 193.1 LBS | BODY MASS INDEX: 25.48 KG/M2 | TEMPERATURE: 97.8 F | DIASTOLIC BLOOD PRESSURE: 53 MMHG | HEART RATE: 79 BPM

## 2021-06-03 VITALS
DIASTOLIC BLOOD PRESSURE: 50 MMHG | OXYGEN SATURATION: 98 % | BODY MASS INDEX: 24.91 KG/M2 | SYSTOLIC BLOOD PRESSURE: 99 MMHG | TEMPERATURE: 97.4 F | WEIGHT: 188.8 LBS | HEART RATE: 84 BPM

## 2021-06-03 VITALS
HEART RATE: 77 BPM | BODY MASS INDEX: 26.22 KG/M2 | DIASTOLIC BLOOD PRESSURE: 55 MMHG | WEIGHT: 198.7 LBS | OXYGEN SATURATION: 96 % | TEMPERATURE: 97.8 F | SYSTOLIC BLOOD PRESSURE: 94 MMHG

## 2021-06-03 VITALS
BODY MASS INDEX: 25.69 KG/M2 | DIASTOLIC BLOOD PRESSURE: 54 MMHG | SYSTOLIC BLOOD PRESSURE: 107 MMHG | OXYGEN SATURATION: 93 % | HEART RATE: 82 BPM | WEIGHT: 193.8 LBS | TEMPERATURE: 97.8 F | HEIGHT: 73 IN

## 2021-06-03 VITALS
WEIGHT: 201.1 LBS | SYSTOLIC BLOOD PRESSURE: 118 MMHG | HEART RATE: 75 BPM | DIASTOLIC BLOOD PRESSURE: 64 MMHG | BODY MASS INDEX: 26.53 KG/M2 | TEMPERATURE: 97.8 F | OXYGEN SATURATION: 99 %

## 2021-06-03 VITALS
OXYGEN SATURATION: 98 % | SYSTOLIC BLOOD PRESSURE: 124 MMHG | BODY MASS INDEX: 26.18 KG/M2 | TEMPERATURE: 97.8 F | WEIGHT: 198.4 LBS | DIASTOLIC BLOOD PRESSURE: 60 MMHG | HEART RATE: 73 BPM

## 2021-06-03 VITALS
HEART RATE: 76 BPM | WEIGHT: 192.9 LBS | DIASTOLIC BLOOD PRESSURE: 64 MMHG | TEMPERATURE: 97.6 F | BODY MASS INDEX: 25.45 KG/M2 | SYSTOLIC BLOOD PRESSURE: 100 MMHG | OXYGEN SATURATION: 100 %

## 2021-06-03 VITALS
OXYGEN SATURATION: 97 % | TEMPERATURE: 97.6 F | BODY MASS INDEX: 24.83 KG/M2 | HEART RATE: 85 BPM | DIASTOLIC BLOOD PRESSURE: 48 MMHG | WEIGHT: 188.2 LBS | SYSTOLIC BLOOD PRESSURE: 80 MMHG

## 2021-06-03 VITALS
BODY MASS INDEX: 26.37 KG/M2 | TEMPERATURE: 97.9 F | DIASTOLIC BLOOD PRESSURE: 64 MMHG | HEART RATE: 69 BPM | OXYGEN SATURATION: 97 % | SYSTOLIC BLOOD PRESSURE: 124 MMHG | WEIGHT: 199.9 LBS

## 2021-06-03 VITALS
OXYGEN SATURATION: 96 % | TEMPERATURE: 97.4 F | WEIGHT: 192.9 LBS | DIASTOLIC BLOOD PRESSURE: 56 MMHG | BODY MASS INDEX: 25.45 KG/M2 | SYSTOLIC BLOOD PRESSURE: 105 MMHG | HEART RATE: 85 BPM

## 2021-06-03 VITALS
HEART RATE: 73 BPM | TEMPERATURE: 97.7 F | BODY MASS INDEX: 25.71 KG/M2 | OXYGEN SATURATION: 98 % | WEIGHT: 194.9 LBS | DIASTOLIC BLOOD PRESSURE: 58 MMHG | SYSTOLIC BLOOD PRESSURE: 123 MMHG

## 2021-06-03 VITALS
BODY MASS INDEX: 26.28 KG/M2 | WEIGHT: 199.2 LBS | HEART RATE: 73 BPM | SYSTOLIC BLOOD PRESSURE: 88 MMHG | OXYGEN SATURATION: 95 % | DIASTOLIC BLOOD PRESSURE: 49 MMHG | TEMPERATURE: 98.1 F

## 2021-06-03 VITALS — BODY MASS INDEX: 25.47 KG/M2 | WEIGHT: 192.2 LBS | HEIGHT: 73 IN

## 2021-06-03 VITALS
WEIGHT: 199.7 LBS | BODY MASS INDEX: 26.35 KG/M2 | DIASTOLIC BLOOD PRESSURE: 45 MMHG | SYSTOLIC BLOOD PRESSURE: 76 MMHG | TEMPERATURE: 97.7 F | OXYGEN SATURATION: 97 % | HEART RATE: 77 BPM

## 2021-06-03 VITALS — BODY MASS INDEX: 26.86 KG/M2 | WEIGHT: 198.3 LBS | HEIGHT: 72 IN

## 2021-06-03 VITALS
HEART RATE: 87 BPM | SYSTOLIC BLOOD PRESSURE: 97 MMHG | WEIGHT: 193 LBS | BODY MASS INDEX: 25.46 KG/M2 | TEMPERATURE: 97.6 F | DIASTOLIC BLOOD PRESSURE: 50 MMHG | OXYGEN SATURATION: 97 %

## 2021-06-03 VITALS
BODY MASS INDEX: 25.4 KG/M2 | HEART RATE: 85 BPM | SYSTOLIC BLOOD PRESSURE: 93 MMHG | OXYGEN SATURATION: 97 % | DIASTOLIC BLOOD PRESSURE: 53 MMHG | TEMPERATURE: 97.6 F | WEIGHT: 192.5 LBS

## 2021-06-03 VITALS — WEIGHT: 198 LBS | HEIGHT: 73 IN | BODY MASS INDEX: 26.24 KG/M2

## 2021-06-03 VITALS — WEIGHT: 204 LBS | BODY MASS INDEX: 27.04 KG/M2 | HEIGHT: 73 IN

## 2021-06-03 VITALS
WEIGHT: 194.7 LBS | HEART RATE: 78 BPM | BODY MASS INDEX: 25.69 KG/M2 | TEMPERATURE: 97.7 F | OXYGEN SATURATION: 98 % | DIASTOLIC BLOOD PRESSURE: 53 MMHG | SYSTOLIC BLOOD PRESSURE: 92 MMHG

## 2021-06-03 VITALS — WEIGHT: 196.5 LBS | HEIGHT: 73 IN | BODY MASS INDEX: 26.04 KG/M2

## 2021-06-03 VITALS — WEIGHT: 194 LBS | BODY MASS INDEX: 25.6 KG/M2

## 2021-06-03 NOTE — PROGRESS NOTES
[facility] Radiation Oncology Follow Up Note    Patient: Avi Villafana  MRN: [id number]  Date of Service: 11/14/2019    Assessment:     1. 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 Concurrent radiation with carboplatin/paclitaxel. Finished 5040 cGy/28 fx on 10/14/2019.     1. Patient with 5lb weight loss and BP 80/48 today. Typically runs low BP but combined with weight loss have ordered 1 L NS. Patient with similar presentation and BP on 10/17/2019, received IV fluids.  2. Encouraged adequate caloric intake, diet modification to avoid irritating foods in esophagus. He will work on this but states having some difficulty.   3. Follow up with myself PRN.  4. Long term follow up with medical oncology, has PET CT scheduled with F/U appointment on 12/12/2019. Has interim appointment with med onc in 2 weeks.        Face to face time  25 minutes with > 75% spent on consultation, education and coordination of care.    Subjective:      HPI: Avi Villafana is a 79 y.o. male who was treated with radiation therapy for lower esophageal adenocarcinoma.      The patient originally presented to the ED on 8/8/019 with light headedness and one episode of black stools. During his evaluation his Hgb returned at 8 which was down from baseline 16,  was given 1 unit, and discharged. An EGD  was performed which showed a 3 cm mass a the posterior cardia, extending up the distal esophagus, biopsy was performed which returned with moderately differentiated adenocarcinoma. Further evaluation with CT CAP w oral/IV contrast showed focal wall thickening of the distal GE junction and a few 8mm and smaller LNs in the far near the distal esophagus and GE junction range, otherwise no evidence of distant metastatic disease.      An EUS FNA was performed on 8/16/2019, an  gastrohepatic LN, 8 x 6 mm, returned with metastatic adenocarcinoma. There were three smaller LNs deep to the tumor which were inaccessible to biopsy. PET CT on 8/21/2019 consistent with distal esophageal carcinoma with paraesophageal and gastrohepatic ligament LN metastases above the level of the celiac axis.      SITE TREATED: lower esophagus  TOTAL DOSE: 5040 cGy  NUMBER OF FRACTIONS: 28  DATES COMPLETED: 10/14/2019  CONCURRENT CHEMOTHERAPY: Yes  ADJUVANT THERAPY:Yes     He tolerated the treatment without unexpected side effects.     The patient presents today for routine office visit. He continues to have dysphagia which he feels limits his oral intake. Has been using Mylanta to help with eating however hasn't been using this much for the past week. Otherwise no complaints.      Current Outpatient Medications   Medication Sig Dispense Refill     carbidopa-levodopa (SINEMET)  mg per tablet Take 1 tablet by mouth 3 (three) times a day. Take at 7am, noon, and 5-7pm       escitalopram oxalate (LEXAPRO) 5 MG tablet TAKE 1 AND 1/2 TABLETS BY MOUTH EVERY  tablet 3     finasteride (PROSCAR) 5 mg tablet Take 5 mg by mouth daily.       lidocaine-prilocaine (EMLA) cream Apply 1 application topically as needed. Apply to port 1/2 to 1 hour prior to accessing port.       omeprazole (PRILOSEC OTC) 20 MG tablet Take 1 tablet (20 mg total) by mouth 2 (two) times a day before meals. 90 tablet 0     potassium citrate (UROCIT-K) 10 mEq (1,080 mg) SR  tablet Take 20 mEq by mouth 2 (two) times a day.       rosuvastatin (CRESTOR) 20 MG tablet Take 20 mg by mouth at bedtime.        tamsulosin (FLOMAX) 0.4 mg Cp24 Take 0.4 mg by mouth Daily after breakfast.              docusate sodium (STOOL SOFTENER ORAL) Take 1 tablet by mouth 2 (two) times a day as needed.       ondansetron (ZOFRAN) 4 MG tablet Take 4 mg by mouth every 6 (six) hours as needed for nausea.       No current facility-administered medications for this visit.        The following portions of the patient's history were reviewed and updated as appropriate: allergies, current medications, past family history, past medical history, past social history, past surgical history and problem list.    Review of Systems    General  Constitutional (WDL): Exceptions to WDL  Fatigue: Fatigue relieved by rest(improving)  Weight Loss: to <10% from baseline, intervention not indicated(down 5 lbs)  EENT  Eye Disorder (WDL): All eye disorder elements are within defined limits(wears glasses)  Ear Disorder (WDL): All ear disorder elements are within defined limits  Respiratory       Respiratory (WDL): Exceptions to WDL  Cough: Mild symptoms, nonprescription intervention indicated(occasionally)  Dyspnea: Shortness of breath with moderate exertion  Cardiovascular  Cardiovascular (WDL): All cardiovascular elements are within defined limits  Endocrine     Gastrointestinal  Gastrointestinal (WDL): Exceptions to WDL  Anorexia: Loss of appetite without alteration in eating habits  Dysgeusia: Altered taste with change in diet (e.g., oral supplements), noxious or unpleasant taste, loss of taste  Dysphagia: Symptomatic, able to eat regular diet  Esophagitis: Asymptomatic, clinical or diagnostic observations only, intervention not indicated  Musculoskeletal  Musculoskeletal and Connetive Tissue Disorders (WDL): Exceptions to WDL  Myalgia: Mild pain  Integumentary               Integumentary (WDL): Exceptions to WDL  Alopecia: Hair  loss of up to 50% of normal for that individual that is not obvious from a distance but only on close inspection, a different hair style may be required to cover the hair loss but it does not require a wig or hair piece to camouflage  Neurological  Neurosensory (WDL): Exceptions to WDL  Peripheral Motor Neuropathy: Asymptomatic, clinical or diagnostic observations only, intervention not indicated  Ataxia: Asymptomatic, clinical or diagnostic observations only, intervention not indicated  Peripheral Sensory Neuropathy: Asymptomatic, loss of deep tendon reflexes or paresthesia(cold feet from Parkinson's)  Dizziness: Mild unsteadiness or sensation of movement  Psychological/Emotional   Patient Coping: Accepting;Open/discussion  Hematological/Lymphatic  Lymph (WDL): All lymph disorder elements are within defined limits  Dermatologic     Genitourinary/Reproductive  Genitourinary (WDL): Exceptions to WDL(nocturia x3-4)  Reproductive     Pain              Currently in Pain: Yes  Pain Score (Initial OR Reassessment): 4  Pain Frequency: Intermittent  Location: esophagus  Pain Intervention(s): Home medication(softer foods)  Response to Interventions: better  AUA Assessment                                  Accompanied by  Accompanied by: Family Member      Objective:     Physical Exam    Vitals:    11/14/19 1019   BP: (!) 80/48   Pulse: 85   Temp: 97.6  F (36.4  C)   TempSrc: Oral   SpO2: 97%   Weight: 188 lb 3.2 oz (85.4 kg)     GENERAL: No acute distress. Cooperative in conversation.   HEENT: pupils are equal, round and reactive. Oromucosa moist.  RESP: Normal respiratory effort.   ABD: Soft, nontender.  MUSCULOSKELETAL: no palpable points of tenderness   NEURO: Non focal. Alert and oriented x3.   PSYCH: Within normal limits. No depression or anxiety.  SKIN: Warm dry intact. Radiation related erythema and irritation resolved.   EXTREMITIES: No edema.    Recent Labs: No results found for this or any previous visit (from the  past 168 hour(s)).    Imaging: Imaging results 30 days: No results found.    I, Gena Cowan MD personally performed the services described in this documentation, as scribed by Parish Gupta in my presence, and it is both accurate and complete.    Signed by: Gena Cowan MD, MPH

## 2021-06-03 NOTE — PROGRESS NOTES
Patient here ambulatory accompanied by wife for follow up s/p radiation for his esophageal cancer.  Patient continues to have some throat irritation and burning with swallowing.  Wife pushing fluids, reinforced softer foods and trying to get more calories and protein.  Seen by Dr. Cowan.  Plan RTC for follow up as directed by physician.

## 2021-06-03 NOTE — PROGRESS NOTES
Avi is here today for ongoing management of esophageal cancer. Today is a 1 month f/u with labs and OV with Juancho Morales NP. Aparna Meyer

## 2021-06-03 NOTE — PROGRESS NOTES
[facility] Hematology and Oncology Progress Note    Patient: Avi Villafana  MRN: [id number]  Date of Service: 11/26/2019        Reason for Visit:    Follow up ~6 weeks post completion definitive carboplatin + Taxol radiosensitizing chemotherapy    Assessment and Plan:    Cancer Staging    1. 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  PDL-1 pending.    79 y.o.     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   with 2 children.  Quit smoking in 1983.  Drinks alcohol ~3 times/week.    Personal history of Parkinson disease diagnosed about a year ago.  Tremors mainly in his hands and currently well controlled with Sinemet.  Normal gait.  Denies any chronic pain or sensory changes in his extremities.    08/06/19 - presented with lightheadedness and black stools with hemoglobin of 7.7 g/dL (baseline hemoglobin around 16 g/dL).      08/07/19 CT CAP without oral with IV contrast showed a focal wall thickening of the distal esophagus, a few 8 mm and smaller lymph nodes in the fat near the distal esophagus and GE junction.  No evidence of distant metastases.    08/07/19 EGD by Dr. France showed a 3 cm mass at the posterior cardia, extending up distal esophagus.  Biopsy confirmed moderately differentiated adenocarcinoma.    08/07/19 GE juction mass biopsy - c/w invasive moderately differentiated BETITO.    08/16/19 EUS showed hypoechoic mass at the GE junction, 14 mm in maximal thickness, T2 (into but not through the muscularis per prior).  Celiac axis was normal.  Multiple benign cysts in the liver.  3 small lymph nodes were seen deep to the tumor which were not assessable for biopsy.  An 8 x 6 mm gastrohepatic lymph node FNA:      Cytology c/w metastatic adenocarcinoma.     08/21/19 NM PET - distal esophageal carcinoma with paraesophageal and gastrohepatic ligament lymph  "node metastases above the level of the celiac axis. No evidence of distant metastases.    Chemoradiation therapy (either neoadjvuant or definitive) was recommended.      08/26/19 - port-a-cath placed by Dr. Alexandra.     09/05 - 10/14/19 completed 6 weekly carboplatin + paclitaxel chemotherapy treatments concurrent with 5040 cGy/28 fx EBRT.      11/26/19:    CBC preliminary - WBC and ANC WNL.  Plts 134.  HgB up 2 grams @ 12.3.    BMP - WNLRashad Cárdenas presents in good spirits accompanied by his spouse.  He looks and feels quite good.  He continues to note very mild occasional esophagitis when eating certain foods managed with Maalox at most once weekly and no XS Tylenol needed the past 2 weeks.  He also takes omeprazole twice daily for the same.  His appetite is \"good\" and getting better and he is eating and taking oral fluids well.  His weight is up a few pounds, but still about a 10 pound drop since starting chemotherapy.  No nausea - no antiemetics.  Persistent, occasional orthostatic light headedness r/t parkinsons.    Looking and doing well ~6 weeks out from definitive chem/radiation therapy.    Continue prn Maalox and XS Tylenol and scheduled Omeprazole twice daily for esophageal complaints.    Excellent BP - he's been pushing oral fluids.     Received the 2019-20 flu vaccine.    12/12/19 - f/u 8 weeks post combined chemo/radiation with HEME1, CMP and NM PET.    Treatment goal is for cure.                   2. Iron deficiency anemia:    Secondary to blood loss from GE junction ulcerated mass    Presented with Hgb 7.7 g/dl in early August 2019.     08/08 and 08/10/19 transfused with 1 u pRBCs.    08/06/19 iron studies - iron deficiency.     09/05 and 09/12/19 - received 510 mg IV Feraheme.      Current Hgb increased @ 12.3 g/dl.  MCV 90.     ECOG Performance:     ECOG Performance Status: 1    Distress Assessment:    Distress Assessment Score: No distress    Pain:  Pain Score (Initial OR Reassessment): 0        TT " > 25 minutes face to face with > 50% counseling and care coordination.    Juancho Morales, CNP      CC: Tad Tovar MD  ____________________________________________________    Interim History:    Mr. Avi Villafana presents accompanied by his spouse ~6 weeks post completion definitive carboplatin + Taxol radiosensitizing chemotherapy.  He looks and feels quite good.  He is eating well and no longer notes any nausea.  He denies cough, fever, chills, unusual headaches, visual or mentation disturbance bowel or bladder issues, rash.  His appetite, weight and performance status have all improved.      Pain Status:    Currently in Pain: No/denies    Review of Systems:    Constitutional  Constitutional (WDL): Exceptions to WDL  Weight Gain: 5 - <10% from baseline(3 lbs)  Neurosensory  Neurosensory (WDL): All neurosensory elements are within defined limits  Cardiovascular  Cardiovascular (WDL): All cardiovascular elements are within defined limits  Pulmonary  Respiratory (WDL): Exceptions to WDL  Dyspnea: Shortness of breath with moderate exertion  Gastrointestinal  Gastrointestinal (WDL): Exceptions to WDL  Anorexia: Loss of appetite without alteration in eating habits(improving)  Dysphagia: Symptomatic, able to eat regular diet(occ. with hard foods)  Dysgeusia: Altered taste with change in diet (e.g., oral supplements), noxious or unpleasant taste, loss of taste  Genitourinary  Genitourinary (WDL): All genitourinary elements are within defined limits  Integumentary  Integumentary (WDL): Exceptions to WDL  Alopecia: Hair loss of up to 50% of normal for that individual that is not obvious from a distance but only on close inspection, a different hair style may be required to cover the hair loss but it does not require a wig or hair piece to camouflage  Patient Coping  Patient Coping: Open/discussion  Distress Assessment  Distress Assessment Score: No distress  Accompanied by  Accompanied by: Family Member    Past  History:    Past Medical History:   Diagnosis Date     Adenocarcinoma of esophagus (H) 8/9/2019     Family history of myocardial infarction     mom 50's     GERD (gastroesophageal reflux disease)      High cholesterol      History of transfusion      Parkinson disease (H)          Past Surgical History:   Procedure Laterality Date     CARDIAC CATHETERIZATION       ESOPHAGOGASTRODUODENOSCOPY N/A 8/16/2019    Procedure: ENDOSCOPIC ULTRASOUND, FINE NEEDLE ASPIRATION;  Surgeon: Colt France MD;  Location: [facility] [place];  Service: Gastroenterology     HERNIA REPAIR       KIDNEY STONE SURGERY N/A June 2013?     neck fusion N/A      NM ESOPHAGOGASTRODUODENOSCOPY TRANSORAL DIAGNOSTIC N/A 8/7/2019    Procedure: ESOPHAGOGASTRODUODENOSCOPY (EGD) with biopsies;  Surgeon: Colt France MD;  Location: [facility];  Service: Gastroenterology     TUNNELED VENOUS CATHETER PLACEMENT N/A 8/26/2019    Procedure: PLACEMENT PORTACATH;  Surgeon: Josiah Alexandra MD;  Location: [facility];  Service: General       Physical Exam:    Recent Vitals 11/26/2019   Height -   Weight 191 lbs 8 oz   BSA (m2) 2.12 m2   /62   Pulse 92   Temp 97.9   Temp src 1   SpO2 98   Some recent data might be hidden       GENERAL:   Pleasant.  Alert and oriented.  Comfortable.  In no acute distress.    HEENT:   Atraumatic and normocephalic.  FAINA.  EOMI.  No pallor.  No icterus.  No mucosal lesions.    LYMPH NODES:  No palpable cervical, axillary or inguinal lymphadenopathy.    CHEST:   Lungs clear.    CVS:    S1 and S2 heard.  Regular rate and rhythm.  No murmur, gallop or rub heard.  No peripheral edema.    ABDOMEN:   Soft.  Not tender or distended.  No palpable hepatomegaly or splenomegaly.    EXTREMITIES:  Warm.  No JOSUÉ.    SKIN:    No rash, bruising or purpura noted.      Lab Results:    Reviewed with patient.    Recent Results (from the past 24 hour(s))   Basic Metabolic Panel   Result Value Ref Range     Sodium 141 136 - 145 mmol/L    Potassium 4.0 3.5 - 5.0 mmol/L    Chloride 107 98 - 107 mmol/L    CO2 24 22 - 31 mmol/L    Anion Gap, Calculation 10 5 - 18 mmol/L    Glucose 112 70 - 125 mg/dL    Calcium 9.0 8.5 - 10.5 mg/dL    BUN 13 8 - 28 mg/dL    Creatinine 1.14 0.70 - 1.30 mg/dL    GFR MDRD Af Amer >60 >60 mL/min/1.73m2    GFR MDRD Non Af Amer >60 >60 mL/min/1.73m2   HM1 (CBC with Diff)   Result Value Ref Range    WBC 4.5 4.0 - 11.0 thou/uL    RBC 4.49 4.40 - 6.20 mill/uL    Hemoglobin 12.3 (L) 14.0 - 18.0 g/dL    Hematocrit 40.5 40.0 - 54.0 %    MCV 90 80 - 100 fL    MCH 27.4 27.0 - 34.0 pg    MCHC 30.4 (L) 32.0 - 36.0 g/dL    RDW 23.0 (H) 11.0 - 14.5 %    Platelets 134 (L) 140 - 440 thou/uL    MPV 10.6 8.5 - 12.5 fL    Neutrophils % 51 50 - 70 %    Lymphocytes % 38 20 - 40 %    Monocytes % 8 2 - 10 %    Eosinophils % 2 0 - 6 %    Basophils % 1 0 - 2 %    Neutrophils Absolute 2.3 2.0 - 7.7 thou/uL    Lymphocytes Absolute 1.7 0.8 - 4.4 thou/uL    Monocytes Absolute 0.4 0.0 - 0.9 thou/uL    Eosinophils Absolute 0.1 0.0 - 0.4 thou/uL    Basophils Absolute 0.0 0.0 - 0.2 thou/uL   Manual Differential   Result Value Ref Range    Platelet Estimate Decreased (!) Normal    Ovalocytes 1+ (!) Negative         Imaging:    No new imaging.

## 2021-06-03 NOTE — PATIENT INSTRUCTIONS - HE
Recent Results (from the past 24 hour(s))   Basic Metabolic Panel   Result Value Ref Range    Sodium 141 136 - 145 mmol/L    Potassium 4.0 3.5 - 5.0 mmol/L    Chloride 107 98 - 107 mmol/L    CO2 24 22 - 31 mmol/L    Anion Gap, Calculation 10 5 - 18 mmol/L    Glucose 112 70 - 125 mg/dL    Calcium 9.0 8.5 - 10.5 mg/dL    BUN 13 8 - 28 mg/dL    Creatinine 1.14 0.70 - 1.30 mg/dL    GFR MDRD Af Amer >60 >60 mL/min/1.73m2    GFR MDRD Non Af Amer >60 >60 mL/min/1.73m2   HM1 (CBC with Diff)   Result Value Ref Range    WBC 4.5 4.0 - 11.0 thou/uL    RBC 4.49 4.40 - 6.20 mill/uL    Hemoglobin 12.3 (L) 14.0 - 18.0 g/dL    Hematocrit 40.5 40.0 - 54.0 %    MCV 90 80 - 100 fL    MCH 27.4 27.0 - 34.0 pg    MCHC 30.4 (L) 32.0 - 36.0 g/dL    RDW 23.0 (H) 11.0 - 14.5 %    Platelets 134 (L) 140 - 440 thou/uL    MPV 10.6 8.5 - 12.5 fL

## 2021-06-04 VITALS
HEART RATE: 92 BPM | BODY MASS INDEX: 25.27 KG/M2 | TEMPERATURE: 97.9 F | SYSTOLIC BLOOD PRESSURE: 126 MMHG | DIASTOLIC BLOOD PRESSURE: 62 MMHG | WEIGHT: 191.5 LBS | OXYGEN SATURATION: 98 %

## 2021-06-04 VITALS
BODY MASS INDEX: 25.6 KG/M2 | TEMPERATURE: 97.4 F | HEART RATE: 79 BPM | WEIGHT: 194 LBS | OXYGEN SATURATION: 95 % | SYSTOLIC BLOOD PRESSURE: 111 MMHG | DIASTOLIC BLOOD PRESSURE: 67 MMHG

## 2021-06-04 VITALS
SYSTOLIC BLOOD PRESSURE: 89 MMHG | BODY MASS INDEX: 23.57 KG/M2 | DIASTOLIC BLOOD PRESSURE: 53 MMHG | OXYGEN SATURATION: 95 % | TEMPERATURE: 97.6 F | HEART RATE: 62 BPM | WEIGHT: 173.8 LBS

## 2021-06-04 VITALS
HEIGHT: 72 IN | SYSTOLIC BLOOD PRESSURE: 88 MMHG | OXYGEN SATURATION: 95 % | DIASTOLIC BLOOD PRESSURE: 54 MMHG | HEART RATE: 76 BPM | WEIGHT: 198.4 LBS | BODY MASS INDEX: 26.87 KG/M2

## 2021-06-04 VITALS — BODY MASS INDEX: 23.87 KG/M2 | WEIGHT: 176 LBS

## 2021-06-04 VITALS — WEIGHT: 192 LBS | HEIGHT: 73 IN | BODY MASS INDEX: 25.45 KG/M2

## 2021-06-04 VITALS
HEART RATE: 88 BPM | BODY MASS INDEX: 26.31 KG/M2 | TEMPERATURE: 98.1 F | WEIGHT: 194 LBS | SYSTOLIC BLOOD PRESSURE: 121 MMHG | DIASTOLIC BLOOD PRESSURE: 71 MMHG

## 2021-06-04 VITALS
WEIGHT: 189.2 LBS | TEMPERATURE: 98.5 F | HEART RATE: 82 BPM | BODY MASS INDEX: 25.66 KG/M2 | SYSTOLIC BLOOD PRESSURE: 127 MMHG | DIASTOLIC BLOOD PRESSURE: 80 MMHG

## 2021-06-04 VITALS — BODY MASS INDEX: 26.17 KG/M2 | WEIGHT: 193.2 LBS | HEIGHT: 72 IN

## 2021-06-04 VITALS
HEART RATE: 78 BPM | BODY MASS INDEX: 25.77 KG/M2 | TEMPERATURE: 98.6 F | DIASTOLIC BLOOD PRESSURE: 78 MMHG | WEIGHT: 190 LBS | SYSTOLIC BLOOD PRESSURE: 119 MMHG

## 2021-06-04 VITALS
OXYGEN SATURATION: 94 % | WEIGHT: 196.4 LBS | DIASTOLIC BLOOD PRESSURE: 63 MMHG | SYSTOLIC BLOOD PRESSURE: 126 MMHG | HEART RATE: 72 BPM | BODY MASS INDEX: 25.91 KG/M2 | TEMPERATURE: 98 F

## 2021-06-04 NOTE — PROGRESS NOTES
Patient here today for labs and follow-up visit with Dr. Cox for adenocarcinoma lower third of esophagus/MARY Mello RN

## 2021-06-04 NOTE — PROGRESS NOTES
[facility] Hematology and Oncology Progress Note    Patient: Avi Villafana  MRN: [id number]  Date of Service: 12/12/2019        Reason for Visit    Chief Complaint   Patient presents with     HE Cancer     Esophageal Cancer       Assessment and Plan  Cancer Staging  Adenocarcinoma of esophagus (H)  Staging form: Esophagus - Adenocarcinoma, AJCC 8th Edition  - Clinical stage from 8/23/2019: Stage III (cT2, cN1, cM0, G2) - Signed by Gena Cowan MD on 8/23/2019      ECOG Performance   ECOG Performance Status: 0     Distress Assessment  Distress Assessment Score: No distress    Pain  Currently in Pain: No/denies  Pain Score (Initial OR Reassessment): No/Denies Pain    #. 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   He completed definitive chemoradiation 8 weeks ago.  He is recovering from treatment side effects very well.  I reviewed his labs today and they are recovering and in satisfactory range although his hemoglobin and platelets are not in completely normal range.   I reviewed the posttreatment PET scan (8 weeks after completion of chemoradiation), there is marked interval response to therapy with minimal residual hypermetabolic him at GE junction (SUV max of 4.2, previously 26) with no new lesions.  I explained to them that this is a excellent response and very mild residual FDG uptake in the GE junction might be continued healing.  However, her he will need to be evaluated by EGD for better visualization and clarification.   I will refer him to gastroenterology, Dr. France and back to Dr. Alexandra.    Follow up with me in about 6 weeks with labs prior.     #.  Mild normocytic anemia. Improving.   He maintain his hemoglobin very well.  He received 2 doses of Feraheme and September 2019.     Repeat HM-2 and ferritin in 6 weeks.     #.  Mild neutropenia, thrombocytopenia, related to chemotherapy. Resolving.   No additional intervention necessary at this point.   Expectant recovery after finishing chemotherapy and radiation.      Problem List    1. Adenocarcinoma of esophagus (H)        ______________________________________________________________________________  Diagnosis  2019-admitted to bone invasive moderately differentiated adenocarcinoma of GE junction.  EUS findin mm in maximal thickness, T2.  Celiac axis was normal.  Multiple benign cysts in the liver.  A few regional lymph nodes were suspicious and 1 of the gastrohepatic lymph node was positive for metastatic adenocarcinoma.   -2019-PET scan showed distal esophageal carcinoma with paraesophageal and gastrohepatic ligament lymph node metastases above the level of celiac axis.  No evidence of distant metastases.   - no loss of MMR. HER2- negative. PDL1 CPS 2     Treatment to date  2019-10/14/2019: concurrent chemotherapy and radiation (carboplatin/paclitaxel)    History of Present Illness    Avi presents today accompanied by his wife.  He has gained about 3 pounds since last visit couple weeks ago.  He is overall feeling better.  Able to eat a lot better.  Denies any major concern.      Pain Status  Currently in Pain: No/denies    Review of Systems    Oncology Nurse Assessment/CMA Intake: Constitutional  Constitutional (WDL): Exceptions to WDL  Fatigue: None  Fever: None  Chills: None  Weight Gain: 5 - <10% from baseline(3# 19)  Weight Loss: None  Neurosensory  Neurosensory (WDL): Exceptions to WDL  Peripheral Motor Neuropathy: None  Ataxia: None  Peripheral Sensory Neuropathy: Asymptomatic, loss of deep tendon reflexes or paresthesia(cold feet from Parkinson's)  Confusion: None  Syncope: None  Eye   Eye Disorder (WDL): All eye disorder elements are within defined limits  Ear  Ear Disorder (WDL): All ear disorder elements are within defined limits  Cardiovascular  Cardiovascular (WDL): All cardiovascular elements are within defined limits  Pulmonary  Respiratory (WDL): Within Defined  Limits  Gastrointestinal  Gastrointestinal (WDL): All gastrointestinal elements are within defined limits  Genitourinary  Genitourinary (WDL): All genitourinary elements are within defined limits  Lymphatic  Lymph (WDL): All lymph disorder elements are within defined limits  Musculoskeletal and Connective Tissue  Musculoskeletal and Connetive Tissue Disorders (WDL): All Musculoskeletal and Connetive Tissue Disorder elements are within defined limits  Integumentary  Integumentary (WDL): Exceptions to WDL  Alopecia: Hair loss of up to 50% of normal for that individual that is not obvious from a distance but only on close inspection, a different hair style may be required to cover the hair loss but it does not require a wig or hair piece to camouflage  Rash Maculo-Papular: None  Pruritus: None  Urticaria: None  Palmar-Plantar Erythrodysesthesia Syndrome: None  Flushing: None  Patient Coping  Patient Coping: Accepting;Open/discussion  Distress Assessment  Distress Assessment Score: No distress  Accompanied by  Accompanied by: Family Member(wife, Louisa)  Oral Chemo Adherence         Past History  Past Medical History:   Diagnosis Date     Adenocarcinoma of esophagus (H) 8/9/2019     Family history of myocardial infarction     mom 50's     GERD (gastroesophageal reflux disease)      High cholesterol      History of transfusion      Parkinson disease (H)        Physical Exam    Recent Vitals 12/12/2019   Height -   Weight 194 lbs   BSA (m2) 2.13 m2   /67   Pulse 79   Temp 97.4   Temp src 1   SpO2 95   Some recent data might be hidden     General: alert, awake, not in acute distress  HEENT: Head: Normal, normocephalic, atraumatic.  Eye: Normal external eye, conjunctiva, lids cornea, MINH.  Ears:  Non-tender.  Nose: Normal external nose, mucus membranes and septum.  Pharynx: Dental Hygiene adequate. Normal buccal mucosa. Normal pharynx.  Neck / Thyroid: Supple, no masses, nodes, nodules or enlargement.  Lymphatics: No  abnormally enlarged lymph nodes.  Chest: Normal chest wall and respirations. Clear to auscultation.  Heart: S1 S2 RRR, no murmur.   Abdomen: abdomen is soft without significant tenderness, masses, organomegaly or guarding  Extremities: normal strength, tone, and muscle mass  Skin: Scattered erythematous non-blanchable rash on bilateral upper extremities.  Stable.  CNS: non focal.    Lab Results    Recent Results (from the past 168 hour(s))   POCT Glucose   Result Value Ref Range    Glucose 88 70 - 139 mg/dL   Comprehensive Metabolic Panel   Result Value Ref Range    Sodium 141 136 - 145 mmol/L    Potassium 4.0 3.5 - 5.0 mmol/L    Chloride 108 (H) 98 - 107 mmol/L    CO2 23 22 - 31 mmol/L    Anion Gap, Calculation 10 5 - 18 mmol/L    Glucose 112 70 - 125 mg/dL    BUN 14 8 - 28 mg/dL    Creatinine 1.10 0.70 - 1.30 mg/dL    GFR MDRD Af Amer >60 >60 mL/min/1.73m2    GFR MDRD Non Af Amer >60 >60 mL/min/1.73m2    Bilirubin, Total 0.8 0.0 - 1.0 mg/dL    Calcium 8.9 8.5 - 10.5 mg/dL    Protein, Total 6.5 6.0 - 8.0 g/dL    Albumin 3.8 3.5 - 5.0 g/dL    Alkaline Phosphatase 56 45 - 120 U/L    AST 15 0 - 40 U/L    ALT <9 0 - 45 U/L   HM1 (CBC with Diff)   Result Value Ref Range    WBC 4.5 4.0 - 11.0 thou/uL    RBC 4.37 (L) 4.40 - 6.20 mill/uL    Hemoglobin 12.3 (L) 14.0 - 18.0 g/dL    Hematocrit 39.5 (L) 40.0 - 54.0 %    MCV 90 80 - 100 fL    MCH 28.1 27.0 - 34.0 pg    MCHC 31.1 (L) 32.0 - 36.0 g/dL    RDW 19.5 (H) 11.0 - 14.5 %    Platelets 128 (L) 140 - 440 thou/uL    MPV 9.9 8.5 - 12.5 fL    Neutrophils % 61 50 - 70 %    Lymphocytes % 30 20 - 40 %    Monocytes % 8 2 - 10 %    Eosinophils % 1 0 - 6 %    Basophils % 0 0 - 2 %    Neutrophils Absolute 2.8 2.0 - 7.7 thou/uL    Lymphocytes Absolute 1.4 0.8 - 4.4 thou/uL    Monocytes Absolute 0.4 0.0 - 0.9 thou/uL    Eosinophils Absolute 0.1 0.0 - 0.4 thou/uL    Basophils Absolute 0.0 0.0 - 0.2 thou/uL       Imaging    Nm Pet Ct Skull To Mid Thigh    Result Date: 12/10/2019  EXAM:  NM PET CT SKULL TO MID THIGH LOCATION: [facility] DATE/TIME: 12/10/2019 11:37 AM INDICATION: Subsequent treatment planning and restaging for malignant neoplasm of lower third of esophagus. Status post chemoradiation. Monitor treatment response. COMPARISON: FDG PET/CT dated 08/21/2019 TECHNIQUE: Serum glucose level 88 mg/dL. One hour post intravenous administration of 8.8 mCi F-18 FDG, PET imaging was performed from the skull base to the mid thighs utilizing attenuation correction with concurrent axial CT and PET/CT image fusion. Dose  reduction techniques were used. FINDINGS: Mild residual FDG uptake near the gastroesophageal junction (max SUV 4.2, previously 26.0). The remaining FDG uptake is physiologic from the skull base to mid thigh. Mild carotid artery bifurcation calcifications. Right chest port with tip terminating near the superior cavoatrial junction. Moderate coronary artery calcium. Multiple hepatic cysts. Renal cysts. Right parapelvic renal cysts. Punctate nonobstructing renal calculi. Prior left inguinal hernia repair. Pelvic phleboliths. Multilevel degenerative changes of the spine.     Marked interval response to therapy with minimal residual hypermetabolism at the gastroesophageal junction.      Signed by: Spencer Cox MD

## 2021-06-05 VITALS
DIASTOLIC BLOOD PRESSURE: 58 MMHG | TEMPERATURE: 97.6 F | SYSTOLIC BLOOD PRESSURE: 101 MMHG | OXYGEN SATURATION: 95 % | HEART RATE: 76 BPM | BODY MASS INDEX: 22.81 KG/M2 | WEIGHT: 168.2 LBS

## 2021-06-05 VITALS — WEIGHT: 166 LBS | BODY MASS INDEX: 22.51 KG/M2

## 2021-06-05 NOTE — PROGRESS NOTES
[facility] Hematology and Oncology Progress Note    Patient: Avi Villafana  MRN: [id number]  Date of Service: 01/23/2020        Reason for Visit    Chief Complaint   Patient presents with     HE Cancer     Esophageal Cancer       Assessment and Plan  Cancer Staging  Adenocarcinoma of esophagus (H)  Staging form: Esophagus - Adenocarcinoma, AJCC 8th Edition  - Clinical stage from 8/23/2019: Stage III (cT2, cN1, cM0, G2) - Signed by Gena Cowan MD on 8/23/2019      ECOG Performance   ECOG Performance Status: 0     Distress Assessment  Distress Assessment Score: No distress    Pain  Currently in Pain: No/denies    #. cT2 cN1 M0 adenocarcinoma of the lower third of esophagus (GE junction).        He completed definitive chemoradiation in October 2019.  Posttreatment PET scan showed favorable response with mild residual uptake at the GE junction, SUV max of 4.2.  Subsequent EGD showed focal residual moderately differentiated adenocarcinoma identified in the area of erosion/ulceration.  Background nonneoplastic gastric mucosa with chronic inflammation secondary to treatment effect.    He is clinically looking really well. I explained to him and his wife, Louisa that I would recommend to return to Dr. Alexandra to assess surgical resectability.  I reviewed the NCCN guidelines with them.  I informed him that I would consider chemotherapy only if he is determined not to be a surgical candidate, as it will be a palliative intent.  They asked very valid questions about the extent of surgery, possible morbidity, mortality from surgery considering his age.  I explained to him that he will be thoroughly assessed for his lung and cardiac status if he is determined to be a surgical candidate and I defer surgical related questions to Dr. Alexandra.    I will plan to follow-up after he meets with Dr. Alexandra.   Several questions were answered to their satisfaction.    #.  Iron deficiency anemia   EGD on 1/13/2020 showed  ulcerated but reactive gastropathy.  It was negative for chronic inflammation, atrophy and H pylori.   He has started omeprazole per gastroenterology.   I recommend IV Injectafer 2 doses, 1 week apart.  Because of his gastric ulceration and GE junction cancer, I would not give oral iron due to increased risk of malabsorption and pain (Received 2 doses of Feraheme in 2019).     #.  Mild neutropenia, thrombocytopenia, related to chemotherapy.    His neutrophil has recovered completely.  However, platelet is still lagging behind but in the satisfactory range.  No intervention necessary at this point.  We will continue to monitor.      #.  Parkinson disease, or mainly tremors and well controlled with Sinemet.  #.  Forgetfulness.    Problem List    1. Adenocarcinoma of esophagus (H)  Ambulatory referral to General Surgery    sodium chloride flush 10 mL (NS)    heparin lockflush (PF) porcine 300-600 Units      ______________________________________________________________________________  Diagnosis  2019-admitted to bone invasive moderately differentiated adenocarcinoma of GE junction.  EUS findin mm in maximal thickness, T2.  Celiac axis was normal.  Multiple benign cysts in the liver.  A few regional lymph nodes were suspicious and 1 of the gastrohepatic lymph node was positive for metastatic adenocarcinoma.   -2019-PET scan showed distal esophageal carcinoma with paraesophageal and gastrohepatic ligament lymph node metastases above the level of celiac axis.  No evidence of distant metastases.   - no loss of MMR. HER2- negative. PDL1 CPS 2     12/10/2019- PET scan showed marked interval response to therapy with minimal residual hypermetabolism at GE junction (SUV max 4.2, previously 26).    2020- EGD showed possible reflux or radiation induced esophagitis.  Biopsy taken at GE junction.  5 mm stomach ulcer, a few in the antrum.  No residual tumor noted in the distal esophagus/GE junction or  gastric cardia.  The stomach biopsy showed ulcerative reactive gastropathy.  However GE junction biopsy showed focal residual moderately differentiated adenocarcinoma in the area of erosion/ulceration.    Treatment to date  9/5/2019-10/14/2019: concurrent chemotherapy and radiation 5040 cGy/28fx (carboplatin/paclitaxel- received 6 weekly doses)    History of Present Illness    Avi presents today accompanied by his wife, Louisa.  Reported he continued to get better.  He has been very active.  He golfs.  Appetite is better.  Denies any stomach pain.  No nausea no vomiting.  Completed EGD a week ago.  He is here to review the result and next step in management.    Pain Status  Currently in Pain: No/denies    Review of Systems    Oncology Nurse Assessment/CMA Intake: Constitutional  Constitutional (WDL): All constitutional elements are within defined limits  Neurosensory  Neurosensory (WDL): Exceptions to WDL  Peripheral Motor Neuropathy: None  Ataxia: None  Peripheral Sensory Neuropathy: Asymptomatic, loss of deep tendon reflexes or paresthesia(cold sensitive-hands, feet)  Confusion: None  Syncope: None  Eye   Eye Disorder (WDL): All eye disorder elements are within defined limits  Ear  Ear Disorder (WDL): All ear disorder elements are within defined limits  Cardiovascular  Cardiovascular (WDL): All cardiovascular elements are within defined limits  Pulmonary  Respiratory (WDL): Within Defined Limits  Gastrointestinal  Gastrointestinal (WDL): All gastrointestinal elements are within defined limits  Genitourinary  Genitourinary (WDL): Exceptions to WDL  Urinary Frequency: Present(3 times @ noc)  Urinary Retention: None  Urinary Tract Pain: None  Lymphatic  Lymph (WDL): All lymph disorder elements are within defined limits  Musculoskeletal and Connective Tissue  Musculoskeletal and Connetive Tissue Disorders (WDL): All Musculoskeletal and Connetive Tissue Disorder elements are within defined  limits  Integumentary  Integumentary (WDL): All integumentary elements are within defined limits  Patient Coping  Patient Coping: Accepting;Open/discussion  Distress Assessment  Distress Assessment Score: No distress  Accompanied by  Accompanied by: Family Member  Oral Chemo Adherence         Past History  Past Medical History:   Diagnosis Date     Adenocarcinoma of esophagus (H) 8/9/2019     Family history of myocardial infarction     mom 50's     GERD (gastroesophageal reflux disease)      High cholesterol      History of transfusion      Parkinson disease (H)        Physical Exam    Recent Vitals 1/23/2020   Height -   Weight 196 lbs 6 oz   BSA (m2) 2.14 m2   /63   Pulse 72   Temp 98   Temp src -   SpO2 94   Some recent data might be hidden     General: alert, awake, not in acute distress  HEENT: Head: Normal, normocephalic, atraumatic.  Eye: Normal external eye, conjunctiva, lids cornea, MINH.  Ears:  Non-tender.  Nose: Normal external nose, mucus membranes and septum.  Pharynx: Dental Hygiene adequate. Normal buccal mucosa. Normal pharynx.  Neck / Thyroid: Supple, no masses, nodes, nodules or enlargement.  Lymphatics: No abnormally enlarged lymph nodes.  Chest: Normal chest wall and respirations. Clear to auscultation.  Heart: S1 S2 RRR, no murmur.   Abdomen: abdomen is soft without significant tenderness, masses, organomegaly or guarding  Extremities: normal strength, tone, and muscle mass  Skin: normal. no rash or abnormalities  CNS: non focal.    Lab Results    Recent Results (from the past 168 hour(s))   Comprehensive Metabolic Panel   Result Value Ref Range    Sodium 141 136 - 145 mmol/L    Potassium 4.4 3.5 - 5.0 mmol/L    Chloride 108 (H) 98 - 107 mmol/L    CO2 25 22 - 31 mmol/L    Anion Gap, Calculation 8 5 - 18 mmol/L    Glucose 92 70 - 125 mg/dL    BUN 18 8 - 28 mg/dL    Creatinine 1.06 0.70 - 1.30 mg/dL    GFR MDRD Af Amer >60 >60 mL/min/1.73m2    GFR MDRD Non Af Amer >60 >60 mL/min/1.73m2     Bilirubin, Total 0.7 0.0 - 1.0 mg/dL    Calcium 9.1 8.5 - 10.5 mg/dL    Protein, Total 7.1 6.0 - 8.0 g/dL    Albumin 4.0 3.5 - 5.0 g/dL    Alkaline Phosphatase 57 45 - 120 U/L    AST 16 0 - 40 U/L    ALT <9 0 - 45 U/L   HM2(CBC w/o Differential)   Result Value Ref Range    WBC 4.8 4.0 - 11.0 thou/uL    RBC 4.70 4.40 - 6.20 mill/uL    Hemoglobin 13.5 (L) 14.0 - 18.0 g/dL    Hematocrit 43.2 40.0 - 54.0 %    MCV 92 80 - 100 fL    MCH 28.7 27.0 - 34.0 pg    MCHC 31.3 (L) 32.0 - 36.0 g/dL    RDW 13.6 11.0 - 14.5 %    Platelets 122 (L) 140 - 440 thou/uL    MPV 11.6 8.5 - 12.5 fL   Ferritin   Result Value Ref Range    Ferritin 15 (L) 27 - 300 ng/mL       Imaging    No results found.    Signed by: Spencer Cox MD

## 2021-06-05 NOTE — PROGRESS NOTES
Please inform him/his wife that iron level is low likely from intermittent blood loss from ulcer in the stomach. I recommend IV iron 2 doses, 1 week apart. Order in. Thanks.

## 2021-06-05 NOTE — PROGRESS NOTES
Patient here today for labs and follow-up visit with Dr. Cox for adenocarcinoma of esophagus/MARY Mello RN

## 2021-06-05 NOTE — TELEPHONE ENCOUNTER
"Called patient with message below,   \"Please inform him/his wife that iron level is low likely from intermittent blood loss from ulcer in the stomach. I recommend IV iron 2 doses, 1 week apart. Order in. Thanks.\"    Gave message to patient and wife. Both verbalized understanding. Transferred call to SRIRAM Hopson to schedule appt/MARY Mello RN  "

## 2021-06-06 NOTE — PROGRESS NOTES
Preoperative Exam    Scheduled Procedure: 2/26/20 - Esophagogastrectomy - [facility] - Dr. Alexandra   Surgery Date:  2/26/20  Surgery Location: [facility], fax [phone]    Surgeon:  Dr. Cordova    Assessment/Plan:     1. Preoperative examination    The patient is approved for the indicated surgery, with the proper anesthesia.  Patient should have no problems with this and I would expect a normal recovery.  Follow-up in their primary care clinic with any primary care needs after the surgery.  The necessary labs and tests were done today and reviewed by me.     The patient is here with his wife and is interested in TIVA anesthesia as the patient has had some issues with memory loss from perceived issues with anesthesia in the past.  I told them that I would mention it in my note but it is ultimately up to the anesthetist on how to put him to sleep most effectively and safely.  I told her that they should have that discussion with the anesthetist on the day of surgery.      - Electrocardiogram Perform and Read  - Hemoglobin  - Basic Metabolic Panel    2. Adenocarcinoma of esophagus (H)      3. Iron deficiency anemia due to chronic blood loss    We checked his hemoglobin today and it was quite good so that is very encouraging.    4. Parkinsonism, unspecified Parkinsonism type (H)          Surgical Procedure Risk: Low (reported cardiac risk generally < 1%)  Have you had prior anesthesia?: Yes  Have you or any family members had a previous anesthesia reaction:  Yes: Ask if OK to request TIVA as pt was given too many meds in past surgery and now has memory issues burt to this.   Do you or any family members have a history of a clotting or bleeding disorder?: No  Cardiac Risk Assessment: no increased risk for major cardiac complications    APPROVAL GIVEN to proceed with proposed procedure, without further diagnostic evaluation        Functional Status: Independent  Patient plans to recover at home with family.      Subjective:      Aiv Villafana is a 80 y.o. male who presents for a preoperative consultation.  Patient has esophageal carcinoma and has had some presurgical chemotherapy with good results.  He is here then to have his preoperative exam before he has the surgery done by Dr. Alexandra.  He is looking forward to this and is hopeful that it will help him and give him a good surgical outcome on this cancer.    He has done pretty well with the chemotherapy and has not had any real major untoward effects.  His hemoglobin has been stable to improving.  He is overall feeling pretty good and does not have a lot of pain etc.    All other systems reviewed and are negative, other than those listed in the HPI.    Pertinent History  Do you have difficulty breathing or chest pain after walking up a flight of stairs: No  History of obstructive sleep apnea: No  Steroid use in the last 6 months: No  Frequent Aspirin/NSAID use: No  Prior Blood Transfusion: Yes  Prior Blood Transfusion Reaction: No  If for some reason prior to, during or after the procedure, if it is medically indicated, would you be willing to have a blood transfusion?:  There is no transfusion refusal.    Current Outpatient Medications   Medication Sig Dispense Refill     carbidopa-levodopa (SINEMET)  mg per tablet Take 1 tablet by mouth 3 (three) times a day. Take at 7am, noon, and 5-7pm       docusate sodium (STOOL SOFTENER ORAL) Take 1 tablet by mouth 2 (two) times a day as needed.       escitalopram oxalate (LEXAPRO) 5 MG tablet TAKE 1 AND 1/2 TABLETS BY MOUTH EVERY  tablet 3     finasteride (PROSCAR) 5 mg tablet Take 5 mg by mouth daily.       lidocaine-prilocaine (EMLA) cream Apply 1 application topically as needed. Apply to port 1/2 to 1 hour prior to accessing port.       omeprazole (PRILOSEC) 20 MG capsule TAKE 1 CAPSULE BY MOUTH TWICE DAILY BEFORE MEALS 90 capsule 0     ondansetron (ZOFRAN) 4 MG tablet Take 4 mg by mouth every 6 (six) hours  as needed for nausea.       potassium citrate (UROCIT-K) 10 mEq (1,080 mg) SR tablet Take 20 mEq by mouth 2 (two) times a day.       rosuvastatin (CRESTOR) 20 MG tablet Take 20 mg by mouth at bedtime.        tamsulosin (FLOMAX) 0.4 mg Cp24 Take 0.4 mg by mouth Daily after breakfast.              No current facility-administered medications for this visit.         Allergies   Allergen Reactions     Latex Rash     Latex      Added based on information entered during case entry, please review and add reactions, type, and severity as needed       Patient Active Problem List   Diagnosis     Anxiety     GERD (gastroesophageal reflux disease)     Lipid disorder     Essential hypertension     Low back pain     Sacroiliitis (H)     Profound anemia     Orthostatic dizziness     BPH (benign prostatic hyperplasia)     Parkinsonism (H)     Melena     Adenocarcinoma of esophagus (H)     Encounter for antineoplastic chemotherapy     Iron deficiency anemia due to chronic blood loss     Adverse drug effect     Adverse effect of other drugs, medicaments and biological substances, initial encounter       Past Medical History:   Diagnosis Date     Adenocarcinoma of esophagus (H) 8/9/2019     Family history of myocardial infarction     mom 50's     GERD (gastroesophageal reflux disease)      High cholesterol      History of transfusion      Parkinson disease (H)        Past Surgical History:   Procedure Laterality Date     CARDIAC CATHETERIZATION       ESOPHAGOGASTRODUODENOSCOPY N/A 8/16/2019    Procedure: ENDOSCOPIC ULTRASOUND, FINE NEEDLE ASPIRATION;  Surgeon: Colt France MD;  Location: [facility];  Service: Gastroenterology     HERNIA REPAIR       KIDNEY STONE SURGERY N/A June 2013?     neck fusion N/A      AL ESOPHAGOGASTRODUODENOSCOPY TRANSORAL DIAGNOSTIC N/A 8/7/2019    Procedure: ESOPHAGOGASTRODUODENOSCOPY (EGD) with biopsies;  Surgeon: Colt France MD;  Location: [facility];  Service:  Gastroenterology     TUNNELED VENOUS CATHETER PLACEMENT N/A 2019    Procedure: PLACEMENT PORTACATH;  Surgeon: Josiah Alexandra MD;  Location: [facility];  Service: General       Social History     Socioeconomic History     Marital status:      Spouse name: Louisa     Number of children: Not on file     Years of education: Not on file     Highest education level: Not on file   Occupational History     Occupation: research      Employer: [employer]   Social Needs     Financial resource strain: Not on file     Food insecurity:     Worry: Not on file     Inability: Not on file     Transportation needs:     Medical: Not on file     Non-medical: Not on file   Tobacco Use     Smoking status: Former Smoker     Last attempt to quit: 1983     Years since quittin.9     Smokeless tobacco: Never Used   Substance and Sexual Activity     Alcohol use: Yes     Alcohol/week: 1.0 standard drinks     Types: 1 Glasses of wine per week     Drug use: No     Sexual activity: Yes     Partners: Female   Lifestyle     Physical activity:     Days per week: Not on file     Minutes per session: Not on file     Stress: Not on file   Relationships     Social connections:     Talks on phone: Not on file     Gets together: Not on file     Attends Orthodoxy service: Not on file     Active member of club or organization: Not on file     Attends meetings of clubs or organizations: Not on file     Relationship status: Not on file     Intimate partner violence:     Fear of current or ex partner: Not on file     Emotionally abused: Not on file     Physically abused: Not on file     Forced sexual activity: Not on file   Other Topics Concern     Not on file   Social History Narrative     30 + yrs/ he and wife have grown kids previous marriages    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    Plays golf        Patient Care Team:  Tad Tovar MD as PCP - General (Family Medicine)  Tad Tovar MD as  Assigned PCP  Colt France MD as Physician (Gastroenterology)  Spencer Cox MD as Physician (Hematology and Oncology)  Shyla Kan, RN as Oncology Nurse Navigator (Hematology and Oncology)          Objective:     Vitals:    02/10/20 1437   BP: (!) 88/54   Pulse: 76   SpO2: 95%   Weight: 198 lb 6.4 oz (90 kg)   Height: 6' (1.829 m)         Physical Exam:    General: Awake, Alert and Cooperative   Head: Normocephalic and Atraumatic   Eyes: PERRL, EOMI.   ENT: Normal pearly TMs bilaterally and Oropharynx clear   Neck: Supple and Thyroid without enlargement or nodules   Chest: Chest wall normal   Lungs: Clear to auscultation bilaterally   Heart:: Regular rate and rhythm and no murmurs.  No significant LE edema.   Abdomen: Soft, nontender, nondistended and no hepatosplenomegaly   Musculoskeletal: Moving all extremities and No pain in the extremities   Neuro: Alert and oriented times 3 and Grossly normal   Skin: No rashes or lesions noted        There are no Patient Instructions on file for this visit.    EKG: Normal sinus rhythm with no hyperacute or acute changes seen.  There was 1 PVC seen on the EKG but when I listen to his heart he had barely any irregularities at all.    Labs:  Recent Results (from the past 24 hour(s))   Electrocardiogram Perform and Read    Collection Time: 02/10/20  2:49 PM   Result Value Ref Range    SYSTOLIC BLOOD PRESSURE      DIASTOLIC BLOOD PRESSURE      VENTRICULAR RATE 74 BPM    ATRIAL RATE 74 BPM    P-R INTERVAL 190 ms    QRS DURATION 88 ms    Q-T INTERVAL 404 ms    QTC CALCULATION (BEZET) 448 ms    P Axis 62 degrees    R AXIS -48 degrees    T AXIS 43 degrees    MUSE DIAGNOSIS       Sinus rhythm with occasional Premature ventricular complexes  Left anterior fascicular block  Abnormal ECG  When compared with ECG of 26-FEB-2017 13:44,  Premature ventricular complexes are now Present  Left anterior fascicular block is now Present     Hemoglobin    Collection Time:  02/10/20  3:10 PM   Result Value Ref Range    Hemoglobin 14.5 14.0 - 18.0 g/dL       Immunization History   Administered Date(s) Administered     Hep A, historic 11/10/2008, 06/04/2009     Influenza high dose,seasonal,PF, 65+ yrs 10/03/2018, 10/24/2019     Influenza, inj, historic,unspecified 10/02/2016, 09/24/2017     Pneumo Conj 13-V (2010&after) 10/02/2016     Pneumo Conj 7-V(before 2010) 08/25/2006, 01/30/2007     Tdap 01/03/2007, 11/14/2012     ZOSTER, LIVE 10/05/2012           Electronically signed by Tad Tovar MD 02/10/20 2:39 PM

## 2021-06-06 NOTE — PROGRESS NOTES
"  [facility]      NAME:  Avi Villafana             :  1940    MRN: [id number]    CODE STATUS:  FULL CODE    FACILITY: [facility] [[id number]]         CHIEF COMPLAIN/REASON FOR VISIT:  Chief Complaint   Patient presents with     Review Of Multiple Medical Conditions     see HPI       HISTORY OF PRESENT ILLNESS: Avi Villafana is a 80 y.o. male being seen seen today on the TCU for review of multiple medical condiitons s/p hospitalization for esophageal CA. He was at [facility] from  to 3/9 2020 . Per his EMR, \"with a medical history significant for Parkinson disease, dyslipidemia, anxiety and depression who underwent planned esophagogastrectomy for treatment of adenocarcinoma of the esophagus on .  He is discharged to a skilled nursing facility for ongoing rehabilitative therapy.\". He is up in a chair in common area looking at todays new paper. Reports  and lives in [place]. He does have Parkinsons, frozen face like mask and speech hesitant. He reports some pain to abdominal incisional site, but med regime manages pain.Nursing reported he had a difficult night due to pain and was up/awake a a lot. Sleepy this am when seen, did get oxycodone from RN on duty and some voltaren ointment to back.      Allergies   Allergen Reactions     Latex      Added based on information entered during case entry, please review and add reactions, type, and severity as needed     Latex Rash   :     Current Outpatient Medications   Medication Sig     acetaminophen (TYLENOL) 500 MG tablet Take 2 tablets (1,000 mg total) by mouth every 6 (six) hours as needed for pain or fever.     bisacodyL (DULCOLAX) 10 mg suppository Insert suppository rectally daily as needed for treatment of constipation.     carbidopa-levodopa (SINEMET)  mg per tablet Take 1 tablet by mouth 3 (three) times a day. Take at 7am, noon, and 5-7pm     escitalopram oxalate (LEXAPRO) 5 MG tablet " Take 1 tablet by mouth daily.     finasteride (PROSCAR) 5 mg tablet Take 5 mg by mouth daily.     lidocaine-prilocaine (EMLA) cream Apply 1 application topically as needed. Apply to port 1/2 to 1 hour prior to accessing port.     magnesium hydroxide (MILK OF MAG) 400 mg/5 mL Susp suspension Take 30 mL by mouth daily as needed.     omeprazole 20 mg TbEC Take 20 mg by mouth daily.     ondansetron (ZOFRAN) 4 MG tablet Take 4 mg by mouth every 6 (six) hours as needed for nausea.     oxyCODONE (ROXICODONE) 5 MG immediate release tablet 0.5 tablets (2.5 mg total) by J-Tube route every 8 (eight) hours as needed for pain.     potassium citrate (UROCIT-K) 10 mEq (1,080 mg) SR tablet Take 20 mEq by mouth 2 (two) times a day.     QUEtiapine (SEROQUEL) 25 MG tablet Take 0.5 tablets (12.5 mg total) by mouth at bedtime as needed (agitation).     rosuvastatin (CRESTOR) 20 MG tablet Take 20 mg by mouth at bedtime.      tamsulosin (FLOMAX) 0.4 mg Cp24 Take 0.4 mg by mouth Daily after breakfast.                REVIEW OF SYSTEMS:    Currently, no fever, chills, or rigors. Does not have any visual or hearing problems. Denies any chest pain, headaches, palpitations, lightheadedness, dizziness, shortness of breath, or cough. Appetite is good. Denies any GERD symptoms. Denies any difficulty with swallowing, nausea, or vomiting.  Denies any abdominal pain, diarrhea or constipation. Denies any urinary symptoms. No insomnia. No active bleeding. No rash.       PHYSICAL EXAMINATION:  Vitals:    03/11/20 1805   BP: 119/78   Pulse: 78   Temp: 98.6  F (37  C)   Weight: 190 lb (86.2 kg)         GENERAL: Awake, Alert, oriented x3, not in any form of acute distress, answers questions appropriately, follows simple commands, conversant  HEENT: Head is normocephalic with normal hair distribution. No evidence of trauma. Ears: No acute purulent discharge. Eyes: Conjunctivae pink with no scleral jaundice. Nose: Normal mucosa and septum. NECK: Supple with  no cervical or supraclavicular lymphadenopathy. Trachea is midline.   CHEST: No tenderness or deformity, no crepitus  LUNG: Clear to auscultation with good chest expansion. There are no crackles or wheezes, normal AP diameter.  BACK: No kyphosis of the thoracic spine. Symmetric, no curvature, ROM normal, no CVA tenderness, no spinal tenderness   CVS: There is good S1  S2,rhythm is regular.  ABDOMEN: Soft, + BS.UMBILICUS INCISION WITH STABLES, NO DRAINAGE, RIGHT FLANK AREA DRESSING S/P ct, gTUBE IN PLACE.  EXTREMITIES: Atraumatic. Full range of motion on both upper and lower extremities, there is no tenderness to palpation, no pedal edema, no cyanosis or clubbing, no calf tenderness, normal cap refill, no joint swelling.  SKIN: Warm and dry, no erythema noted, no rashes or lesions.  NEUROLOGICAL: The patient is oriented to person, place and time. Generalized weakness, Parkinsons, hesitant speech  Fine tremor        LABS:    Lab Results   Component Value Date    WBC 9.5 02/28/2020    HGB 10.6 (L) 02/28/2020    HCT 32.0 (L) 02/28/2020    MCV 90 02/28/2020     03/03/2020       Results for orders placed or performed in visit on 02/10/20   Basic Metabolic Panel   Result Value Ref Range    Sodium 143 136 - 145 mmol/L    Potassium 4.3 3.5 - 5.0 mmol/L    Chloride 108 (H) 98 - 107 mmol/L    CO2 25 22 - 31 mmol/L    Anion Gap, Calculation 10 5 - 18 mmol/L    Glucose 87 70 - 125 mg/dL    Calcium 9.0 8.5 - 10.5 mg/dL    BUN 20 8 - 28 mg/dL    Creatinine 1.12 0.70 - 1.30 mg/dL    GFR MDRD Af Amer >60 >60 mL/min/1.73m2    GFR MDRD Non Af Amer >60 >60 mL/min/1.73m2           No results found for: HGBA1C  No results found for: NSZHLONK41OF  Lab Results   Component Value Date    [id number] 323 08/06/2019       ASSESSMENT/PLAN:  1. Malignant neoplasm of esophagus, unspecified location (H)    2. Pain      1. Esophageal CA: S/P surgery, weakened condition. Will need SN for pain management, wound management and chronic medical  condition management. PT/OT for therapies due to ongoing therapies due to decondtioned state. FU with surgeon and oncology.    2. Pain: Increase in pain, we are adding vistaril to scheduled Tylenol for better coverage. Also voltaren gel to right back flank area. Nursing to monitor as he has prn oxycodone ordered as well.      Electronically signed by:  Maribel Jain CNP  This progress note was completed using Dragon software and there may be grammatical errors.

## 2021-06-06 NOTE — ANESTHESIA POSTPROCEDURE EVALUATION
Called to bedside because of leaking around epidural.  Nurse says she redressed the epidural, however it continues to leak.  On exam, epidural seems to be 5 cm at skin.  Epidural pulled since it does not appear to be in the epidural space.  Tip intact.  Site clean/dry/intact.  Informed RN that the patient can get his 9 pm heparin injection.

## 2021-06-06 NOTE — TELEPHONE ENCOUNTER
Patient's wife, Louisa, called wondering if he needs to continue taking omeprazole. She said he has a week left.      Per Dr. Cormier's [facility] note 1/13/20, patient to continue omeprazole, once daily, 30 minutes before breakfast.  I called Louisa back and asked that she follow-up with [facility] on further omeprazole instructions and they could order refill. She verbalized she would. Dr. Brooke torres/MARY Mello RN

## 2021-06-06 NOTE — ANESTHESIA POSTPROCEDURE EVALUATION
Patient: Avi Villafana  Procedure(s):  DIAGNOSTIC LAPAROSCOPY  ESOPHAGOGASTRECTOMY  Anesthesia type: general    Patient location: ICU  Last vitals:   Vitals Value Taken Time   /57 2/28/2020  8:00 AM   Temp 36.9  C (98.5  F) 2/28/2020  8:00 AM   Pulse 90 2/28/2020 10:34 AM   Resp 30 2/28/2020 10:34 AM   SpO2 94 % 2/28/2020 10:34 AM   Vitals shown include unvalidated device data.  Post vital signs: stable  Level of consciousness: awake, confused and responds to simple questions  Post-anesthesia pain: pain controlled  Post-anesthesia nausea and vomiting: no  Pulmonary: nasal cannula  Cardiovascular: stable and blood pressure at baseline  Hydration: adequate  Anesthetic events: no    QCDR Measures:  ASA# 11 - Olinda-op Cardiac Arrest: ASA11B - Patient did NOT experience unanticipated cardiac arrest  ASA# 12 - Olinda-op Mortality Rate: ASA12B - Patient did NOT die  ASA# 13 - PACU Re-Intubation Rate: ASA13B - Patient did NOT require a new airway mgmt  ASA# 10 - Composite Anes Safety: ASA10A - No serious adverse event    Additional Notes:  Existing epidural for post op pain.  Level is at T5 with good pain relief.  Pt c/o some referred chest tube pain, now 2/10.  Waxing and waning confusion since yesterday and is in restraints.  Epidural site looks good.  Continue bupivicaine 0.125% at 8 mL/hour.  VSS.  Continue to follow.

## 2021-06-06 NOTE — ANESTHESIA CARE TRANSFER NOTE
Last vitals:   Vitals:    02/26/20 1431   BP: 101/56   Pulse: 60   Resp: 16   Temp: 35.9  C (96.6  F)   SpO2: 95%     Patient's level of consciousness is drowsy  Spontaneous respirations: yes  Maintains airway independently: yes  Dentition unchanged: yes  Oropharynx: nasal gastric tube in place    QCDR Measures:  ASA# 20 - Surgical Safety Checklist: WHO surgical safety checklist completed prior to induction    PQRS# 430 - Adult PONV Prevention: 4558F - Pt received => 2 anti-emetic agents (different classes) preop & intraop  ASA# 8 - Peds PONV Prevention: NA - Not pediatric patient, not GA or 2 or more risk factors NOT present  PQRS# 424 - Olinda-op Temp Management: 4559F - At least one body temp DOCUMENTED => 35.5C or 95.9F within required timeframe  PQRS# 426 - PACU Transfer Protocol: - Transfer of care checklist used  ASA# 14 - Acute Post-op Pain: ASA14B - Patient did NOT experience pain >= 7 out of 10

## 2021-06-06 NOTE — ANESTHESIA POSTPROCEDURE EVALUATION
Patient: Avi Villafana  Procedure(s):  DIAGNOSTIC LAPAROSCOPY  ESOPHAGOGASTRECTOMY  Anesthesia type: general    Patient location: [facility]  Last vitals:   Vitals Value Taken Time   /69 3/1/2020  7:16 AM   Temp 36.5  C (97.7  F) 3/1/2020  7:16 AM   Pulse 89 3/1/2020  7:16 AM   Resp 20 3/1/2020  7:16 AM   SpO2 93 % 3/1/2020  7:16 AM       Additional Notes:  Patient denies abdominal pain.  Site clean/dry/intact. Will continue epidural at current rate.  Plan to d/c epidural tomorrow; discussed with RN to hold patient's 0900 heparin dose.

## 2021-06-06 NOTE — ANESTHESIA PROCEDURE NOTES
Arterial Line  Reason for Procedure: hemodynamic monitoring  Patient location during procedure: OR pre-induction  Start time: 2/26/2020 9:45 AM  End time: 2/26/2020 9:56 AM  Staffing:  Performing  Anesthesiologist: Albertina Trevino MD  Sterile Precautions:  sterile barriers used during insertion: cap, mask, sterile gloves, large sheet, and hand hygiene used.  Arterial Line:     Laterality: right  Location: radial  Prepped with: ChloroPrep    Needle gauge: 20 G  Number of Attempts: 1  Secured with: tape, transparent dressing and pressure dressing  Flushed with: saline  1% lidocaine local anesthesia used for skin prep.   See MAR for additional medications given.          Additional Notes:  Facile placement, one attempt, one pass.  Patient tolerated well and without complication.

## 2021-06-06 NOTE — ANESTHESIA PROCEDURE NOTES
Epidural Block    Time Called: 2/26/2020 9:59 AM  Reason for Block:post-op pain management  Staffing:  Performing  Anesthesiologist: Albertina Trevino MD  Preanesthetic Checklist  Completed: patient identified, risks, benefits, and alternatives discussed, timeout performed, consent obtained, at patient's request, airway assessed, oxygen available, suction available, emergency drugs available and hand hygiene performed  Procedure  Patient position: sitting  Prep: ChloraPrep  Patient monitoring: continuous pulse oximetry, heart rate and blood pressure  Approach: midline  Injection technique: ROSEY saline  Number of Attempts:1  Needle  Needle type: Zak   Needle gauge: 18 G     Catheter in Space: 4  Assessment  Sensory level: T4  No complications      Additional Notes:  Facile placement, single attempt, single pass.  Patient tolerated well and without complication.

## 2021-06-06 NOTE — PROGRESS NOTES
[facility] Admission note      Patient: Avi Villafana  MRN: [id number]  Date of Service: 3/17/2020      [facility] [[id number]]  Reason for Visit     Chief Complaint   Patient presents with     H & P     Evaluation urgent secondary to profound hypotensive episode as well as wandering and confusional episodes reported with attempts at elopement  Code Status     Full code    Assessment     -Acute episode of hypotension with profoundly low blood pressures noted.  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 coli treated with antibiotics  -Hypophosphatemia corrected in the hospital  -History of Parkinson's disease  -Anxiety with depression  -BPH  -Hyperlipidemia  -Protein calorie malnutrition currently has a feeding tube  -Generalized weakness  -History of chronic low back pain  --History of anxiety and depression  -Gait instability patient remains a high fall risk    Plan     Patient has been admitted to the TCU  for strengthening and rehab.  He is status post esophagogastrectomy and feeding tube jejunostomy pyloroplasty and diagnostic laparoscopy due to underlying history of esophageal cancer status post chemoradiation.  He will do an outpatient follow-up with his surgeon.  UTI has been treated  He had electrolyte abnormalities including low phosphorus which has been corrected.  Delirium was felt to be secondary to disruption sleep-wake cycle with recent surgery.    He was given Seroquel in the hospital.  Hypoxic respiratory failure has resolved he required 1 L of oxygen in the  hospital.  Postoperative pain management was optimized and he has been discharged to the TCU for strengthening and rehab   for anxiety and depression he remains on Lexapro  Unfortunately he had to be seen urgently today because of severe hypertensive episode.  Blood pressures were low in the systolics in the 70s he became dizzy lightheaded and felt he was going to pass out.  Patient was brought back to his room and fluids were pushed with more flushing through his G-tube.  Recheck blood pressures improved to 92/56 with improvement noted.  He is noted to be hypotensive since admission.  Suspect partly due to deconditioning.  He has an underlying history of Parkinson's.  Medication review was done and he a is not on any antihypertensives   however he is on Flomax and finasteride.  We will shift administration of these medications to the evening.  Consider YOSSI hoses if blood pressures continue to be persistent low.  Staff also requested to check orthostatic blood pressures.  Check stat labs to rule out any infection  Also continue to monitor mood and behaviors closely.  He does have confusion episodes with elopement risk and has a wander guard placed on him right now  Cognitive scores were reviewed with therapy and his BMs was 11/15 slums is 19/30 indicating mild to moderate impairment.  He does not carry a formal diagnosis dementia.  Continue to monitor mood and behaviors.  If he continues to be impaired consider adding psychotropic medications to his regimen.  He was delirious in the hospital and was advised to maintain his sleep-wake cycle  Also advised to minimize narcotics  Seroquel 12.5 mg available at bedtime as needed staff to update if this is effective or not  He does remain a falls risk and is ambulating using a walker continue with his PT OT and rehab      History     Patient is a very pleasant 80 y.o. male who is admitted to TCU  Patient was admitted to the hospital and underwent diagnostic laparoscopic  esophagogastrectomy on 2/26/2020.  Patient did well postoperatively.  And has been discharged with a G-tube in place.    Postoperative course however complicated by delirium requiring sitter.  Eventually medical status has stabilized and he has been discharged to the TCU.  Was given Seroquel in the hospital for delirium  Unfortunately is not doing well in the TCU staff requested an evaluation because of sundowning with persistent elopement events.  He frequently will be found wandering.  He has been trying to  his belongings and leave and has no recollection of those events these are mainly happening in the evening.    He has underlying history of Parkinson's and continues with his Sinemet.    He also had developed acute cystitis with work-up revealing more than 100,000 colonies of E. coli he was given IV antibiotics in the hospital.  Had some acute pulmonary insufficiency which resolved he required 1 L of oxygen in the hospital    Also had an acute episode of hypertension  Patient is not on any antihypertensives and became dizzy and lightheaded and was ready to pass out.  Blood pressures were quite low he was given aggressive fluid hydration recheck blood pressures were 93/57      Past Medical History     Active Ambulatory (Non-Hospital) Problems    Diagnosis     Pain     ACP (advance care planning)     Delirium     Esophageal cancer (H)     Adverse effect of other drugs, medicaments and biological substances, initial encounter     Encounter for antineoplastic chemotherapy     Iron deficiency anemia due to chronic blood loss     Adverse drug effect     Adenocarcinoma of esophagus (H)     Profound anemia     Orthostatic dizziness     BPH (benign prostatic hyperplasia)     Parkinsonism (H)     Sacroiliitis (H)     Low back pain     Lipid disorder     Essential hypertension     Anxiety     GERD (gastroesophageal reflux disease)     Past Medical History:   Diagnosis Date     Adenocarcinoma of esophagus (H)  8/9/2019     Anxiety      BPH (benign prostatic hyperplasia)      Essential hypertension      Family history of myocardial infarction      GERD (gastroesophageal reflux disease)      High cholesterol      History of anesthesia complications      History of transfusion      Iron deficiency anemia due to chronic blood loss      Lipid disorder      Low back pain      Orthostatic dizziness      Parkinson disease (H)      Profound anemia      Sacroiliitis (H)        Past Social History     Reviewed, and he  reports that he quit smoking about 37 years ago. He has never used smokeless tobacco. He reports current alcohol use of about 3.0 standard drinks of alcohol per week. He reports that he does not use drugs.    Family History     Reviewed, and family history includes Heart disease in his mother.    Medication List   Post Discharge Medication Reconciliation Status: discharge medications reconciled and changed, per note/orders (see AVS)   Current Outpatient Medications on File Prior to Visit   Medication Sig Dispense Refill     acetaminophen (TYLENOL) 500 MG tablet Take 2 tablets (1,000 mg total) by mouth every 6 (six) hours as needed for pain or fever.  0     bisacodyL (DULCOLAX) 10 mg suppository Insert suppository rectally daily as needed for treatment of constipation.  0     carbidopa-levodopa (SINEMET)  mg per tablet Take 1 tablet by mouth 3 (three) times a day. Take at 7am, noon, and 5-7pm       escitalopram oxalate (LEXAPRO) 5 MG tablet Take 1 tablet by mouth daily.       finasteride (PROSCAR) 5 mg tablet Take 5 mg by mouth daily.       lidocaine-prilocaine (EMLA) cream Apply 1 application topically as needed. Apply to port 1/2 to 1 hour prior to accessing port.       magnesium hydroxide (MILK OF MAG) 400 mg/5 mL Susp suspension Take 30 mL by mouth daily as needed.  0     omeprazole 20 mg TbEC Take 20 mg by mouth daily.       ondansetron (ZOFRAN) 4 MG tablet Take 4 mg by mouth every 6 (six) hours as needed  for nausea.       oxyCODONE (ROXICODONE) 5 MG immediate release tablet 0.5 tablets (2.5 mg total) by J-Tube route every 8 (eight) hours as needed for pain. 13 tablet 0     potassium citrate (UROCIT-K) 10 mEq (1,080 mg) SR tablet Take 20 mEq by mouth 2 (two) times a day.       QUEtiapine (SEROQUEL) 25 MG tablet Take 0.5 tablets (12.5 mg total) by mouth at bedtime as needed (agitation).  0     rosuvastatin (CRESTOR) 20 MG tablet Take 20 mg by mouth at bedtime.        tamsulosin (FLOMAX) 0.4 mg Cp24 Take 0.4 mg by mouth Daily after breakfast.              No current facility-administered medications on file prior to visit.        Allergies     Allergies   Allergen Reactions     Latex      Added based on information entered during case entry, please review and add reactions, type, and severity as needed     Latex Rash       Review of Systems   A comprehensive review of 14 systems was done. Pertinent findings noted here and in history of present illness. All the rest negative.  Constitutional: Negative.  Negative for fever, chills, he has activity change, appetite change and fatigue.   HENT: Negative for congestion and facial swelling.    Eyes: Negative for photophobia, redness and visual disturbance.   Respiratory: Negative for cough and chest tightness.    Cardiovascular: Negative for chest pain, palpitations and leg swelling.   Gastrointestinal: Negative for nausea, diarrhea, constipation, blood in stool and abdominal distention.   Genitourinary: Negative.    Musculoskeletal: Negative.  Ambulating using a walker but reporting dizziness and lightheadedness  Skin: Negative.    Neurological: Negative for dizziness, tremors, syncope, weakness, light-headedness and headaches.   Hematological: Does not bruise/bleed easily.   Psychiatric/Behavioral: Negative.  Recall issues noted some confusion      Physical Exam     Recent Vitals 3/11/2020   Height -   Weight 190 lbs   BSA (m2) 2.09 m2   /78   Pulse 78   Temp 98.6    Temp src -   SpO2 -   Some recent data might be hidden     Initial blood pressures when patient became symptomatic were low in the 70s systolic recheck was 93/57 post fluid administration  Constitutional: Oriented to person, place, and time and appears well-developed.   HEENT:  Normocephalic and atraumatic.  Eyes: Conjunctivae and EOM are normal. Pupils are equal, round, and reactive to light. No discharge.  No scleral icterus. Nose normal. Mouth/Throat: Oropharynx is clear and moist. No oropharyngeal exudate.    NECK: Normal range of motion. Neck supple. No JVD present. No tracheal deviation present. No thyromegaly present.   CARDIOVASCULAR: Normal rate, regular rhythm and intact distal pulses.  Exam reveals no gallop and no friction rub.  Systolic murmur present.  PULMONARY: Effort normal and breath sounds normal. No respiratory distress.No Wheezing or rales.  ABDOMEN: Soft. Bowel sounds are normal. No distension and no mass.  There is no tenderness. There is no rebound and no guarding. No HSM.  Midline surgical incision intact with staples noted   GJ tube noted in the left upper quadrant  MUSCULOSKELETAL: Normal range of motion. No edema and no tenderness. Mild kyphosis, no tenderness.  LYMPH NODES: Has no cervical, supraclavicular, axillary and groin adenopathy.   NEUROLOGICAL: Alert and oriented to person, place, and time. No cranial nerve deficit.  Normal muscle tone. Coordination normal.   GENITOURINARY: Deferred exam.  SKIN: Skin is warm and dry. No rash noted. No erythema. No pallor.   EXTREMITIES: No cyanosis, no clubbing, no edema. No Deformity.  PSYCHIATRIC: Normal mood, affect and behavior.  Impaired recall; has episodes of confusion and sundowning in the evening with multiple elopement events reported      Lab Results     Recent Results (from the past 240 hour(s))   Phosphorus    Collection Time: 03/07/20  3:48 PM   Result Value Ref Range    Phosphorus 3.0 2.5 - 4.5 mg/dL   Phosphorus Level > 2.4 no  replacement required    Collection Time: 03/08/20  6:47 AM   Result Value Ref Range    Phosphorus 2.6 2.5 - 4.5 mg/dL   Phosphorus Level > 2.4 no replacement required    Collection Time: 03/09/20  7:51 AM   Result Value Ref Range    Phosphorus 2.5 2.5 - 4.5 mg/dL   Creatinine    Collection Time: 03/09/20  7:51 AM   Result Value Ref Range    Creatinine 0.75 0.70 - 1.30 mg/dL    GFR MDRD Af Amer >60 >60 mL/min/1.73m2    GFR MDRD Non Af Amer >60 >60 mL/min/1.73m2            Imaging Results     Xr Chest 1 View Portable    Result Date: 3/5/2020  EXAM: XR CHEST 1 VIEW PORTABLE LOCATION: [facility] DATE/TIME: 3/5/2020 6:29 AM INDICATION: Postop follow-up. Esophageal gastrectomy. Chest tubes. COMPARISON: 03/04/2020. FINDINGS: Upright portable chest. 2 right chest tubes remain in place. There is a small right apical pneumothorax which is not completely included on the image but measures approximately 1 cm. Right chest wall port. The heart is enlarged. There are postoperative changes of gastric pull-through. Mild bibasilar infiltrates have increased in the interval. The heart is at the upper limits normal in size. No pulmonary edema.     Small right apical pneumothorax.       Xr Chest 1 View Portable    Result Date: 3/4/2020  EXAM: XR CHEST 1 VIEW PORTABLE LOCATION: [facility] DATE/TIME: 3/4/2020 9:18 AM INDICATION: Postop follow-up. Esophageal gastrectomy. Chest tubes. COMPARISON: 03/03/2020     No change in right-sided chest tube or right subclavian Chemo-Port. Heart and mediastinal size are normal. Pulmonary vascular congestion is similar to prior study. There remains some patchy opacity at  the left lung base, similar to prior study. Question of a trace left pleural effusion, stable.    Xr Chest 1 View Portable    Result Date: 3/3/2020  EXAM: XR CHEST 1 VIEW PORTABLE LOCATION: [facility] DATE/TIME: 3/3/2020 8:05 AM INDICATION: Postop follow-up. Esophageal gastrectomy. Chest tubes.  COMPARISON: 03/02/2020 and older studies.     Postthoracotomy changes on the right and gastric pull-through. 3 right-sided chest tubes subclavian Port-A-Cath. No evidence of a pneumothorax. Minimal atelectasis in the left base with likely trace effusion, unchanged. Right lung is clear. Heart and pulmonary vascularity are normal.    Xr Chest 1 View Portable    Result Date: 3/2/2020  EXAM: XR CHEST 1 VIEW PORTABLE LOCATION: [facility] DATE/TIME: 3/2/2020 9:54 AM INDICATION: Postop follow-up. Esophageal gastrectomy. Chest tubes. COMPARISON: 3/1/2020.     Postop change in the chest. 2 right chest tubes in place. No pneumothorax. Port-A-Cath tip in the SVC. Some minimal atelectasis left lower lobe. Lungs otherwise clear and expanded.    Xr Chest 1 View Portable    Result Date: 3/1/2020  EXAM: XR CHEST 1 VIEW PORTABLE LOCATION: [facility] DATE/TIME: 3/1/2020 5:55 AM INDICATION: chest tubes COMPARISON: 02/29/2020     Right Port-A-Cath in SVC. 2 right chest tubes. Recent postsurgical change. Atelectasis or infiltrate left lung base. Small left effusion.    Xr Chest 1 View Portable    Result Date: 2/29/2020  EXAM: XR CHEST 1 VIEW PORTABLE LOCATION: [facility] DATE/TIME: 2/29/2020 8:49 AM INDICATION: chest tubes COMPARISON: 02/27/2020     There is a right chest wall Port-A-Cath. 2 right lateral approach chest tubes have not changed in position. No pneumothorax. Minimal right pleural thickening is again seen. No definite right pleural fluid. Atelectasis in the medial right lower lung has not changed. There is a small volume of left pleural fluid with atelectasis. Normal size of the heart.     Xr Chest 1 View Portable    Result Date: 2/27/2020  EXAM: XR CHEST 1 VIEW PORTABLE LOCATION: [facility] DATE/TIME: 2/27/2020 5:54 AM INDICATION: Chest tube. COMPARISON: 02/26/2020     Stable cardiomediastinal silhouette. Enteric tube, right Port-A-Cath and 2 right chest tubes. Pulmonary venous  congestion. Bilateral lower lung atelectasis or infiltrate. Small left effusion. Tiny right basilar pneumothorax difficult to exclude. Soft tissue emphysema right chest.    Xr Gastrografin Esophagram    Result Date: 3/2/2020  EXAM: XR GASTROGRAFIN ESOPHAGRAM LOCATION: [facility] DATE/TIME: 3/2/2020 12:20 PM INDICATION: Postop distal esophagectomy and partial gastrectomy with gastric pull-through. Evaluate anastomosis for leaks. COMPARISON: None. TECHNIQUE: Routine. FINDINGS: FLUOROSCOPIC TIME: 1.4 minutes NUMBER OF IMAGES: 9 ESOPHAGUS: Postop change of distal esophagectomy with gastric pull-through. No leaks at the esophagogastric anastomosis. Contrast flows through the remainder of the stomach into the proximal duodenum.     CONCLUSION: 1.  Postop change. 2.  No leaks at the anastomosis.    Xr Chest 1 View    Result Date: 2/26/2020  EXAM: XR CHEST 1 VIEW LOCATION: [facility] DATE/TIME: 2/26/2020 3:06 PM INDICATION: CT. COMPARISON: 8/26/2019     There is a right Port-A-Cath, an NG tube with its tip in the stomach, and two right-sided chest tubes. The patient had an esophagogastrectomy, there is a small amount of free air under the right diaphragm. A pneumothorax is not seen. There is  slight blunting of both costophrenic angles. The right lung is clear. There is some left retrocardiac opacity partially obscuring the diaphragm. This may be atelectasis rather than infiltrate.    Xr Ng Tube Insertion    Result Date: 2/27/2020  EXAM: XR NG TUBE INSERTION LOCATION: [facility] DATE/TIME: 2/27/2020 1:37 PM INDICATION: Status post esophagectomy with gastric pull-through. NG tube follow-up. Patient requires gastric decompression to prevent aspiration. COMPARISON: None. TECHNIQUE: Routine. FINDINGS: Enteric tube placed without complication.?Tip in the intrathoracic stomach at the level of the gastric body. FLUOROSCOPIC TIME: 1.0 minutes. NUMBER OF IMAGES: 1. Reference CPT Code: 57266              ZAFAR Musa

## 2021-06-06 NOTE — PROGRESS NOTES
"  [facility]      NAME:  Avi Villafana             :  1940    MRN: [id number]    CODE STATUS:  FULL CODE    FACILITY: [facility] [[id number]]         CHIEF COMPLAIN/REASON FOR VISIT:  Chief Complaint   Patient presents with     Review Of Multiple Medical Conditions     see HPI       HISTORY OF PRESENT ILLNESS: Avi Villafana is a 80 y.o. male being seen seen today on the TCU for review of multiple medical condiitons s/p hospitalization for esophageal CA. He was at [facility] from  to 3/9 2020 . Per his EMR, \"with a medical history significant for Parkinson disease, dyslipidemia, anxiety and depression who underwent planned esophagogastrectomy for treatment of adenocarcinoma of the esophagus on .  He is discharged to a skilled nursing facility for ongoing rehabilitative therapy.\". He is up in a chair in common area looking at todays new paper. Reports  and lives in [place]. He does have Parkinsons, frozen face like mask and speech hesitant. He reports some pain to abdominal incisional site, but med regime manages pain. We reviewed MCS role in his care on TCU, TCU routines, med review and pain management. Also discussed code status and ACP as this is an expected need to have POLST signed on the TCU.    Allergies   Allergen Reactions     Latex      Added based on information entered during case entry, please review and add reactions, type, and severity as needed     Latex Rash   :     Current Outpatient Medications   Medication Sig     acetaminophen (TYLENOL) 500 MG tablet Take 2 tablets (1,000 mg total) by mouth every 6 (six) hours as needed for pain or fever.     bisacodyL (DULCOLAX) 10 mg suppository Insert suppository rectally daily as needed for treatment of constipation.     carbidopa-levodopa (SINEMET)  mg per tablet Take 1 tablet by mouth 3 (three) times a day. Take at 7am, noon, and 5-7pm     escitalopram oxalate (LEXAPRO) 5 MG " tablet Take 1 tablet by mouth daily.     finasteride (PROSCAR) 5 mg tablet Take 5 mg by mouth daily.     lidocaine-prilocaine (EMLA) cream Apply 1 application topically as needed. Apply to port 1/2 to 1 hour prior to accessing port.     magnesium hydroxide (MILK OF MAG) 400 mg/5 mL Susp suspension Take 30 mL by mouth daily as needed.     omeprazole 20 mg TbEC Take 20 mg by mouth daily.     ondansetron (ZOFRAN) 4 MG tablet Take 4 mg by mouth every 6 (six) hours as needed for nausea.     oxyCODONE (ROXICODONE) 5 MG immediate release tablet 0.5 tablets (2.5 mg total) by J-Tube route every 8 (eight) hours as needed for pain.     potassium citrate (UROCIT-K) 10 mEq (1,080 mg) SR tablet Take 20 mEq by mouth 2 (two) times a day.     QUEtiapine (SEROQUEL) 25 MG tablet Take 0.5 tablets (12.5 mg total) by mouth at bedtime as needed (agitation).     rosuvastatin (CRESTOR) 20 MG tablet Take 20 mg by mouth at bedtime.      tamsulosin (FLOMAX) 0.4 mg Cp24 Take 0.4 mg by mouth Daily after breakfast.                REVIEW OF SYSTEMS:    Currently, no fever, chills, or rigors. Does not have any visual or hearing problems. Denies any chest pain, headaches, palpitations, lightheadedness, dizziness, shortness of breath, or cough. Appetite is good. Denies any GERD symptoms. Denies any difficulty with swallowing, nausea, or vomiting.  Denies any abdominal pain, diarrhea or constipation. Denies any urinary symptoms. No insomnia. No active bleeding. No rash.       PHYSICAL EXAMINATION:  Vitals:    03/11/20 0402   BP: 121/71   Pulse: 88   Temp: 98.1  F (36.7  C)   Weight: 194 lb (88 kg)         GENERAL: Awake, Alert, oriented x3, not in any form of acute distress, answers questions appropriately, follows simple commands, conversant  HEENT: Head is normocephalic with normal hair distribution. No evidence of trauma. Ears: No acute purulent discharge. Eyes: Conjunctivae pink with no scleral jaundice. Nose: Normal mucosa and septum. NECK:  Supple with no cervical or supraclavicular lymphadenopathy. Trachea is midline.   CHEST: No tenderness or deformity, no crepitus  LUNG: Clear to auscultation with good chest expansion. There are no crackles or wheezes, normal AP diameter.  BACK: No kyphosis of the thoracic spine. Symmetric, no curvature, ROM normal, no CVA tenderness, no spinal tenderness   CVS: There is good S1  S2,rhythm is regular.  ABDOMEN: Soft, + BS.UMBILICUS INCISION WITH STABLES, NO DRAINAGE, RIGHT FLANK AREA DRESSING S/P ct, gTUBE IN PLACE.  EXTREMITIES: Atraumatic. Full range of motion on both upper and lower extremities, there is no tenderness to palpation, no pedal edema, no cyanosis or clubbing, no calf tenderness, normal cap refill, no joint swelling.  SKIN: Warm and dry, no erythema noted, no rashes or lesions.  NEUROLOGICAL: The patient is oriented to person, place and time. Generalized weakness, Parkinsons, hesitant speech  Fine tremor        LABS:    Lab Results   Component Value Date    WBC 9.5 02/28/2020    HGB 10.6 (L) 02/28/2020    HCT 32.0 (L) 02/28/2020    MCV 90 02/28/2020     03/03/2020       Results for orders placed or performed in visit on 02/10/20   Basic Metabolic Panel   Result Value Ref Range    Sodium 143 136 - 145 mmol/L    Potassium 4.3 3.5 - 5.0 mmol/L    Chloride 108 (H) 98 - 107 mmol/L    CO2 25 22 - 31 mmol/L    Anion Gap, Calculation 10 5 - 18 mmol/L    Glucose 87 70 - 125 mg/dL    Calcium 9.0 8.5 - 10.5 mg/dL    BUN 20 8 - 28 mg/dL    Creatinine 1.12 0.70 - 1.30 mg/dL    GFR MDRD Af Amer >60 >60 mL/min/1.73m2    GFR MDRD Non Af Amer >60 >60 mL/min/1.73m2           No results found for: HGBA1C  No results found for: [id number]  Lab Results   Component Value Date    YYYRDAFY55 323 08/06/2019       ASSESSMENT/PLAN:  1. Adenocarcinoma of esophagus (H)    2. Parkinsonism, unspecified Parkinsonism type (H)    3. ACP (advance care planning)      1. Esophageal CA: S/P surgery, weakened condition. Will need  SN for pain management, wound management and chronic medical condition management. PT/OT for therapies due to ongoing therapies due to decondtioned state. FU with surgeon and oncology.    2. Parkinsons: FU with current med management, is on sinemet, fine tremor notes, hesitant speech. As per number 1, will need therapies due to deconditioned state.    3. ACP: Reviewed ACP with pt today and POLST signed as per pt desires to be full code        Electronically signed by:  Maribel Jain CNP  This progress note was completed using Dragon software and there may be grammatical errors.    45  minutes spent of which greater than  75% was face to face communication with the patient about above plan of care which included Code status, MCS role in his care on the TCU, pain management and TCU routines this am.

## 2021-06-07 NOTE — PROGRESS NOTES
[facility] Patient Outreach:     Discharge Date::  3/26/20      Reason for TCU stay (discharge diagnosis)::  S/p esophagogastrectomy due to esophageal adenocarcinoma      Are you feeling better, the same or worse since your discharge?:  Patient is feeling better          As part of your discharge plan, did they discuss home care with you?: Yes        Have your seen them yet, or are they scheduled to visit?: Yes                Do you have any follow up visits scheduled with your PCP or Specialist?:  Yes, with Specialist      Who are you seeing and when is it scheduled?:  Neurologist phone visit on 3/27/20.        I'm glad to hear you're doing well and we want you to continue to do well. Your PCP would like to see you for a follow-up visit. Can we help set that up for your today?: No        (RN) Provided patient the PCP's phone number to call if they have any questions or concerns?: No (Patient's wife called PCP's clinic and waiting for a return call.  )

## 2021-06-07 NOTE — TELEPHONE ENCOUNTER
Orders being requested: Home Care  Physical Therapy    1 time a week for 2 weeks  Reason service is needed/diagnosis: to work on gate, balance and strength training.   When are orders needed by: ASAP  Where to send Orders: Phone:  verbal orders to caller  Okay to leave detailed message?  Yes

## 2021-06-07 NOTE — TELEPHONE ENCOUNTER
ESTHER - Status Update  Who is Calling: Patient Wife Louisa  Update: Pt had Esophageal Cancer surgery at [facility], and was transferred to [facility] on 03/09/20.  Pt was brought home today 03/26/19 and is doing fine. Home health will be making an appointment to come out and see him.  OT will be ordered, Pt has already went on a walk today so wife feels like he is doing great.  Pt also has feeding tube still in place that he is not using.  ESTHER  Okay to leave a detailed message?:  No

## 2021-06-07 NOTE — TELEPHONE ENCOUNTER
Reached out to patient and left a message to return phone call. Please relay the below message when patient or his wife call back. Thank you, Berenice Rodriguez

## 2021-06-07 NOTE — TELEPHONE ENCOUNTER
Patient Returning Call  Reason for call:  Wife returning call  Information relayed to patient:  Message below  Patient has additional questions:  No  If YES, what are your questions/concerns:  n/a  Okay to leave a detailed message?: No call back needed

## 2021-06-07 NOTE — TELEPHONE ENCOUNTER
Orders being requested: Discontinue Home Care services per patient request .  Reason service is needed/diagnosis: Patient is doing well   When are orders needed by: As soon as possible   Where to send Orders: Phone:  [phone]  Okay to leave detailed message?  Yes

## 2021-06-07 NOTE — TELEPHONE ENCOUNTER
Medication Request  Medication name: diclofenac sodium (VOLTAREN) 1 % Gel  Requested Pharmacy: [facility]  Reason for request: routine  When did you use medication last?:  today  Patient offered appointment:  patient declined  Okay to leave a detailed message: yes

## 2021-06-07 NOTE — TELEPHONE ENCOUNTER
Louisa called in regards to patient today that his esophogeal surgery and g-tube removal went well today patient is recovering well. Louisa is questioning what the next steps are now? What about his port and does he need it flushed if he is going to keep it? Also questioning if Dr. Cox will be following patient still?    Johanna Cassidy, CMA

## 2021-06-07 NOTE — TELEPHONE ENCOUNTER
Medication Question or Clarification  Who is calling:   Patient's wife Louisa  What medication are you calling about (include dose and sig)?:   acetaminophen (TYLENOL) 500 MG tablet   0  3/9/2020      Sig - Route: Take 2 tablets (1,000 mg total) by mouth every 6 (six) hours as needed for pain or fever. - Oral     Patient taking differently: Take 1,000 mg by mouth 3 (three) times a day. With additional 100o prn, do not exceed 4 gm in 24 hrs         Class: No Print     Notes to Pharmacy: This new medication is for treatment of pain as needed.       hydrOXYzine pamoate (VISTARIL) 25 MG capsule         Sig - Route: Take 25 mg by mouth 3 (three) times a day. Give with tylenol - Oral     Class: Historical Med         Who prescribed the medication?:   Jas Henao MD Pauletich, Judy, CNP   What is your question/concern?:   Patient is wanting Primary Care Provider's opinion on above two medications for pain.  Dr Bingham at the  of  states Vistaril does not work well for Parkinsons patients for pain.  Patient had Esophageal cancer surgery on 2/26/2020.  Transferred to [facility] on 3/9/2020 and discharged home on 3/26/2020.  Patient was put on Tylenol and Vistaril for pain 3 times a day.  Patient's wife is giving him above medications two times a day because patient is not in that much paint.  Patient's wife is questioning if the above medications can be made Prn as the patient is not in that much pain and the surgery was successful.  Please reach out to patient's wife and advise.  Thank you.  Requested [facility]acy: [id number]  Okay to leave a detailed message?: Yes

## 2021-06-08 NOTE — PROGRESS NOTES
"Avi Villafana is a 80 y.o. male who is being evaluated via a billable telephone visit.      The patient has been notified of following:     \"This telephone visit will be conducted via a call between you and your physician/provider. We have found that certain health care needs can be provided without the need for a physical exam.  This service lets us provide the care you need with a short phone conversation.  If a prescription is necessary we can send it directly to your pharmacy.  If lab work is needed we can place an order for that and you can then stop by our lab to have the test done at a later time.    Telephone visits are billed at different rates depending on your insurance coverage. During this emergency period, for some insurers they may be billed the same as an in-person visit.  Please reach out to your insurance provider with any questions.    If during the course of the call the physician/provider feels a telephone visit is not appropriate, you will not be charged for this service.\"    Patient has given verbal consent to a Telephone visit? Yes    What phone number would you like to be contacted at? [phone]    Patient would like to receive their AVS by AVS Preference: Mail a copy.    Additional provider notes: see toxicity assessment     Phone call duration: [phone]. Dr. Cox will call.    ROBERTO Doss presents during the telephone visit.  Avi reports that he is doing really well after surgery and rehab in TCU.  Reported appetite is really good.  His current weight is 175 pounds which he is slowly regaining.    I reviewed the esophageal resection pathology report and it showed no evidence of active malignancy and all 12 lymph nodes were negative.  I informed them that it is very encouraging result.    He is looking into vitamin B1 supplement, thiamine for his Parkinson disease.     Plan:  -Follow-up labs, CT scan in 6 months (around August 2020) with provider " visit.  -Encouraged him to continue to work on diet and exercise.  -We will keep the port for now.  He will come for port flushes every 2 months.  Our clinic will make a reminder phone call.    Phone call time: [phone]. (26 minutes)

## 2021-06-09 NOTE — PROGRESS NOTES
Pt arrived to infusion clinic. Port accessed with great blood return. Port flushed with Saline and Heparin, de-accessed, band-aid applied. Pt ambulated out of infusion clinic independently.

## 2021-06-11 NOTE — PROGRESS NOTES
[facility] Hematology and Oncology Progress Note    Patient: Avi Villafana  MRN: [id number]  Date of Service: 2020        Reason for Visit    Chief Complaint   Patient presents with     HE Cancer     Esophageal Cancer       Assessment and Plan  Cancer Staging  Adenocarcinoma of esophagus (H)  Staging form: Esophagus - Adenocarcinoma, AJCC 8th Edition  - Clinical stage from 2019: Stage III (cT2, cN1, cM0, G2) - Signed by Gena Cowan MD on 2019      ECOG Performance   ECOG Performance Status: 0     Distress Assessment  Distress Assessment Score: No distress    Pain  Currently in Pain: No/denies  Pain Score (Initial OR Reassessment): No/Denies Pain(with movement 5/10)  Location: R neck    #.  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       He is clinically very well.  Labs were reviewed and they are fairly unremarkable.  I reviewed the CT scan of chest, abdomen and pelvis and it showed no evidence of metastatic disease in the chest, abdomen or pelvis.   Recommend follow-up labs and clinical exam in about 3 months.  Will add imaging studies with clinical concern or not more than twice a year.     #.  Iron deficiency anemia   Appears to be resolving.  His hemoglobin is near normal range.      #.  Parkinson disease, or mainly tremors and well controlled with Sinemet.  #.  Forgetfulness.    Problem List    1. Malignant neoplasm of lower third of esophagus (H)  HM1(CBC and Differential)    Comprehensive Metabolic Panel      ______________________________________________________________________________  Diagnosis  2019-admitted to bone invasive moderately differentiated adenocarcinoma of GE junction.  EUS findin mm in maximal thickness, T2.  Celiac axis was normal.  Multiple benign cysts in the liver.  A few regional lymph nodes were suspicious and 1 of the gastrohepatic lymph node was positive for metastatic adenocarcinoma.   -2019-PET scan showed distal  esophageal carcinoma with paraesophageal and gastrohepatic ligament lymph node metastases above the level of celiac axis.  No evidence of distant metastases.   - no loss of MMR. HER2- negative. PDL1 CPS 2     12/10/2019- PET scan showed marked interval response to therapy with minimal residual hypermetabolism at GE junction (SUV max 4.2, previously 26).    1/13/2020- EGD showed possible reflux or radiation induced esophagitis.  Biopsy taken at GE junction.  5 mm stomach ulcer, a few in the antrum.  No residual tumor noted in the distal esophagus/GE junction or gastric cardia.  The stomach biopsy showed ulcerative reactive gastropathy.  However GE junction biopsy showed focal residual moderately differentiated adenocarcinoma in the area of erosion/ulceration.    Treatment to date  9/5/2019-10/14/2019: concurrent chemotherapy and radiation 5040 cGy/28fx (carboplatin/paclitaxel- received 6 weekly doses)  2/26/2020- en bloc resection of the distal esophagus and gastric cardia by Dr. Alexandra. yPT0 N0    History of Present Illness    Avi presented by himself.  His wife, Louisa was present over the phone.  He is overall doing really well.  He regained his strength.  He is able to eat better.  His blood pressure has been low at times but he denies any lightheadedness or dizziness.  He has been careful with changing in position from sitting to standing.    Pain Status  Currently in Pain: No/denies    Review of Systems    Oncology Nurse Assessment/CMA Intake: Constitutional  Constitutional (WDL): Exceptions to WDL  Fatigue: None  Fever: None  Chills: None  Weight Gain: None  Weight Loss: to <10% from baseline, intervention not indicated(3# 5/6/20)  Neurosensory  Neurosensory (WDL): All neurosensory elements are within defined limits  Eye   Eye Disorder (WDL): All eye disorder elements are within defined limits  Ear  Ear Disorder (WDL): All ear disorder elements are within defined limits  Cardiovascular  Cardiovascular  (WDL): All cardiovascular elements are within defined limits  Pulmonary  Respiratory (WDL): Exceptions to WDL  Cough: None  Dyspnea: Shortness of breath with moderate exertion  Hypoxia: None  Gastrointestinal  Gastrointestinal (WDL): All gastrointestinal elements are within defined limits  Genitourinary  Genitourinary (WDL): All genitourinary elements are within defined limits  Lymphatic  Lymph (WDL): All lymph disorder elements are within defined limits  Musculoskeletal and Connective Tissue  Musculoskeletal and Connetive Tissue Disorders (WDL): Exceptions to WDL  Arthralgia: None  Bone Pain: None  Muscle Weakness : None  Myalgia: Mild pain(R side of neck)  Integumentary  Integumentary (WDL): All integumentary elements are within defined limits  Patient Coping  Patient Coping: Accepting;Open/discussion  Distress Assessment  Distress Assessment Score: No distress  Accompanied by  Accompanied by: Alone  Oral Chemo Adherence         Past History  Past Medical History:   Diagnosis Date     Adenocarcinoma of esophagus (H) 8/9/2019     Anxiety      BPH (benign prostatic hyperplasia)      Essential hypertension      Family history of myocardial infarction     mom 50's     GERD (gastroesophageal reflux disease)      High cholesterol      History of anesthesia complications     memory problems-would like to discuss TIVA anesthesia     History of transfusion      Iron deficiency anemia due to chronic blood loss      Lipid disorder      Low back pain      Orthostatic dizziness      Parkinson disease (H)      Profound anemia      Sacroiliitis (H)        Physical Exam    Recent Vitals 9/2/2020   Height -   Weight 173 lbs 13 oz   BSA (m2) 2 m2   BP 89/53   Pulse 62   Temp 97.6   Temp src 1   SpO2 95   Some recent data might be hidden     General: alert, awake, not in acute distress  HEENT: Head: Normal, normocephalic, atraumatic.  Eye: Normal external eye, conjunctiva, lids cornea, MINH.  Ears:  Non-tender.  Nose: Normal  external nose, mucus membranes and septum.  Pharynx: Dental Hygiene adequate. Normal buccal mucosa. Normal pharynx.  Neck / Thyroid: Supple, no masses, nodes, nodules or enlargement.  Lymphatics: No abnormally enlarged lymph nodes.  Chest: Normal chest wall and respirations. Clear to auscultation.  Heart: S1 S2 RRR, no murmur.   Abdomen: abdomen is soft without significant tenderness, masses, organomegaly or guarding  Extremities: normal strength, tone, and muscle mass  Skin: normal. no rash or abnormalities  CNS: non focal.      Lab Results    Recent Results (from the past 168 hour(s))   HM2(CBC w/o Differential)   Result Value Ref Range    WBC 5.4 4.0 - 11.0 thou/uL    RBC 4.24 (L) 4.40 - 6.20 mill/uL    Hemoglobin 12.5 (L) 14.0 - 18.0 g/dL    Hematocrit 38.6 (L) 40.0 - 54.0 %    MCV 91 80 - 100 fL    MCH 29.5 27.0 - 34.0 pg    MCHC 32.4 32.0 - 36.0 g/dL    RDW 15.8 (H) 11.0 - 14.5 %    Platelets 147 140 - 440 thou/uL    MPV 12.0 8.5 - 12.5 fL   Comprehensive Metabolic Panel   Result Value Ref Range    Sodium 138 136 - 145 mmol/L    Potassium 4.3 3.5 - 5.0 mmol/L    Chloride 106 98 - 107 mmol/L    CO2 26 22 - 31 mmol/L    Anion Gap, Calculation 6 5 - 18 mmol/L    Glucose 79 70 - 125 mg/dL    BUN 16 8 - 28 mg/dL    Creatinine 1.04 0.70 - 1.30 mg/dL    GFR MDRD Af Amer >60 >60 mL/min/1.73m2    GFR MDRD Non Af Amer >60 >60 mL/min/1.73m2    Bilirubin, Total 0.6 0.0 - 1.0 mg/dL    Calcium 8.3 (L) 8.5 - 10.5 mg/dL    Protein, Total 6.1 6.0 - 8.0 g/dL    Albumin 3.6 3.5 - 5.0 g/dL    Alkaline Phosphatase 74 45 - 120 U/L    AST 15 0 - 40 U/L    ALT <9 0 - 45 U/L       Imaging    Ct Chest Abdomen Pelvis With Oral With Iv Cont    Result Date: 9/1/2020  EXAM: CT CHEST ABDOMEN PELVIS W ORAL W IV CONTRAST LOCATION: [facility] DATE/TIME: 9/1/2020 3:11 PM INDICATION: Esophageal cancer, post treatment follow up. COMPARISON: PET/CT dated 12/10/2019. TECHNIQUE: CT scan of the chest, abdomen, and pelvis was performed before  and after injection of IV contrast. Multiplanar reformats were obtained. Dose reduction techniques were used. CONTRAST: 100 mL Iohexol 350 intravenously. FINDINGS: LUNGS AND PLEURA: Mild emphysematous change, no suspicious nodules or masses. Bibasilar atelectasis with small bilateral pleural effusions, right larger than left. MEDIASTINUM/AXILLAE: Since the prior PET CT the patient has undergone distal esophagectomy with gastric pull-through. No mediastinal lymphadenopathy. No pericardial effusion. Moderate coronary artery calcification. HEPATOBILIARY: Multiple rounded water density foci consistent with hepatic cysts, these did not show abnormal metabolism on PET/CT. Probable cholelithiasis. No biliary dilatation. PANCREAS: Mild fatty replacement of the pancreatic head, no suspicious masses or evidence for pancreatitis. SPLEEN: Normal. ADRENAL GLANDS: Normal. KIDNEYS/BLADDER: Peripelvic cysts in the right kidney with small bilateral cortical cysts that do not need further evaluation. Small nonobstructing renal stones bilaterally, no hydronephrosis or ureteral stones. No bladder stone or mass. BOWEL: Again noted is postoperative change from esophagectomy and gastric pull-through. Mild prominence of small bowel loops without definite obstruction. No free intraperitoneal air or fluid. LYMPH NODES: No lymphadenopathy in the abdomen or pelvis. VASCULATURE: Atherosclerotic calcification of the abdominal aorta, renal artery origins and common iliac arteries. No aneurysms. PELVIC ORGANS: Normal. MUSCULOSKELETAL: Degenerative changes in the spine with bridging syndesmophytes at both sacroiliac joints. Degenerative changes in the hips. No bony metastases or fractures identified.     1.  Status post esophagectomy and gastric pull-through. Bibasilar atelectasis with small bilateral pleural effusions right greater than left. No pulmonary nodules or lymphadenopathy. No evidence for metastatic disease in the chest, abdomen or  pelvis. 2.  Stable hepatic cysts, peripelvic right renal cysts and bilateral cortical renal cysts.      Signed by: Spencer Cox MD

## 2021-06-11 NOTE — PROGRESS NOTES
Patient here today for lab and CT results and follow-up visit with Dr. Cox for esophageal CA/MARY Mello RN

## 2021-06-13 NOTE — PROGRESS NOTES
[facility] Hematology and Oncology Progress Note    Patient: Avi Villafana  MRN: [id number]  Date of Service: 12/03/2020        Reason for Visit    GE junction cancer    Assessment and Plan  Cancer Staging  Adenocarcinoma of esophagus (H)  Staging form: Esophagus - Adenocarcinoma, AJCC 8th Edition  - Clinical stage from 8/23/2019: Stage III (cT2, cN1, cM0, G2) - Signed by Gena Cowan MD on 8/23/2019      ECOG Performance   ECOG Performance Status: 1     Distress Assessment  Distress Assessment Score: No distress    Pain  Currently in Pain: No/denies    #1.  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    Overall, Avi has been doing generally well over the last 3 months, although has noted lesser appetite, early satiety and 5 lb weight loss since he was last here. No associated abd pain, nausea or bowel changes. No other focal concerns.    Labwork shows new elevation in alkaline phosphatase, remainder of LFTs normal and he reports no bony pain.    CT scan of chest, abdomen and pelvis in September showed no evidence of metastatic disease in the chest, abdomen or pelvis.     Given his clinical symptoms and alk phos elevation, will repeat CT to rule cancer recurrence. Return to clinic to review results.    #2.  Weight loss, low appetite  Lesser appetite and early satiety is new in past 3 months, although he has a bit of a gradual weight loss since his surgery. Will get CT to rule out cancer recurrence. Discussed small, frequent high-calorie and high-protein meals/snacks through day. Also suggested supplementing with Ensure/Boost a few days per week.     #3.  Iron deficiency anemia  Appears to be resolving.  His hemoglobin is near normal range.    #4.  Parkinson disease, or mainly tremors and well controlled with Sinemet.  #5.  Forgetfulness.    ______________________________________________________________________________  Diagnosis  8/16/2019-admitted to bone invasive moderately  differentiated adenocarcinoma of GE junction.  EUS findin mm in maximal thickness, T2.  Celiac axis was normal.  Multiple benign cysts in the liver.  A few regional lymph nodes were suspicious and 1 of the gastrohepatic lymph node was positive for metastatic adenocarcinoma.   -2019-PET scan showed distal esophageal carcinoma with paraesophageal and gastrohepatic ligament lymph node metastases above the level of celiac axis.  No evidence of distant metastases.   - no loss of MMR. HER2- negative. PDL1 CPS 2     12/10/2019- PET scan showed marked interval response to therapy with minimal residual hypermetabolism at GE junction (SUV max 4.2, previously 26).    2020- EGD showed possible reflux or radiation induced esophagitis.  Biopsy taken at GE junction.  5 mm stomach ulcer, a few in the antrum.  No residual tumor noted in the distal esophagus/GE junction or gastric cardia.  The stomach biopsy showed ulcerative reactive gastropathy.  However GE junction biopsy showed focal residual moderately differentiated adenocarcinoma in the area of erosion/ulceration.    Treatment to date  2019-10/14/2019: concurrent chemotherapy and radiation 5040 cGy/28fx (carboplatin/paclitaxel- received 6 weekly doses)  2020- en bloc resection of the distal esophagus and gastric cardia by Dr. Alexandra. yPT0 N0    History of Present Illness    Avi presented by himself.  He declined having me call his wife/other family members to join the visit.    He is overall doing really well. His main new symptom since Sept is lesser appetite and getting full quicker. He's had 7 lb weight loss over 6 months, with 5 lb of that over the last 3 months. Denies abd pain, nausea, reflux, dysphagia or bowel changes. No bony pain. Chronic mild dyspnea on exertion at baseline, no new respiratory symptoms.     Review of Systems    Oncology Nurse Assessment/CMA Intake: Constitutional  Constitutional (WDL): Exceptions to WDL  Weight Loss: to  <10% from baseline, intervention not indicated  Neurosensory  Neurosensory (WDL): All neurosensory elements are within defined limits  Eye   Eye Disorder (WDL): Assessment not pertinent to visit  Ear  Ear Disorder (WDL): Assessment not pertinent to visit  Cardiovascular  Cardiovascular (WDL): All cardiovascular elements are within defined limits  Pulmonary  Respiratory (WDL): Exceptions to WDL  Dyspnea: Shortness of breath with moderate exertion  Gastrointestinal  Gastrointestinal (WDL): Exceptions to WDL  Anorexia: Oral intake altered without significant weight loss or malnutrition, oral nutritional supplements indicated  Genitourinary  Genitourinary (WDL): All genitourinary elements are within defined limits  Lymphatic  Lymph (WDL): Assessment not pertinent to visit  Musculoskeletal and Connective Tissue  Musculoskeletal and Connetive Tissue Disorders (WDL): All Musculoskeletal and Connetive Tissue Disorder elements are within defined limits  Integumentary  Integumentary (WDL): All integumentary elements are within defined limits  Patient Coping  Patient Coping: Open/discussion  Distress Assessment  Distress Assessment Score: No distress      Past History  Past Medical History:   Diagnosis Date     Adenocarcinoma of esophagus (H) 8/9/2019     Anxiety      BPH (benign prostatic hyperplasia)      Essential hypertension      Family history of myocardial infarction     mom 50's     GERD (gastroesophageal reflux disease)      High cholesterol      History of anesthesia complications     memory problems-would like to discuss TIVA anesthesia     History of transfusion      Iron deficiency anemia due to chronic blood loss      Lipid disorder      Low back pain      Orthostatic dizziness      Parkinson disease (H)      Profound anemia      Sacroiliitis (H)        Physical Exam    Recent Vitals 12/3/2020   Weight 168 lbs 3 oz   BSA (m2) 1.97 m2   /58   Pulse 76   Temp 97.6   Temp src 1   SpO2 95   Some recent data  might be hidden     General: alert, awake, not in acute distress. Alone.   HEENT: Head: Normal, normocephalic, atraumatic.  Pharynx: Dental Hygiene adequate. Normal buccal mucosa. Normal pharynx.  Lymphatics: No abnormally enlarged lymph nodes.  Chest: Normal chest wall and respirations. Clear to auscultation.  Heart: S1 S2 RRR, no murmur.   Abdomen: abdomen is soft without significant tenderness, masses, organomegaly or guarding  Extremities: normal strength, tone, and muscle mass  Skin: normal. no rash or abnormalities  CNS: non focal.    Lab Results    Recent Results (from the past 168 hour(s))   Comprehensive Metabolic Panel   Result Value Ref Range    Sodium 136 136 - 145 mmol/L    Potassium 4.1 3.5 - 5.0 mmol/L    Chloride 103 98 - 107 mmol/L    CO2 22 22 - 31 mmol/L    Anion Gap, Calculation 11 5 - 18 mmol/L    Glucose 115 70 - 125 mg/dL    BUN 15 8 - 28 mg/dL    Creatinine 0.88 0.70 - 1.30 mg/dL    GFR MDRD Af Amer >60 >60 mL/min/1.73m2    GFR MDRD Non Af Amer >60 >60 mL/min/1.73m2    Bilirubin, Total 0.7 0.0 - 1.0 mg/dL    Calcium 8.6 8.5 - 10.5 mg/dL    Protein, Total 6.7 6.0 - 8.0 g/dL    Albumin 3.7 3.5 - 5.0 g/dL    Alkaline Phosphatase 246 (H) 45 - 120 U/L    AST 15 0 - 40 U/L    ALT <9 0 - 45 U/L   HM1 (CBC with Diff)   Result Value Ref Range    WBC 5.3 4.0 - 11.0 thou/uL    RBC 4.46 4.40 - 6.20 mill/uL    Hemoglobin 13.0 (L) 14.0 - 18.0 g/dL    Hematocrit 40.2 40.0 - 54.0 %    MCV 90 80 - 100 fL    MCH 29.1 27.0 - 34.0 pg    MCHC 32.3 32.0 - 36.0 g/dL    RDW 14.5 11.0 - 14.5 %    Platelets 131 (L) 140 - 440 thou/uL    MPV 12.7 (H) 8.5 - 12.5 fL    Neutrophils % 69 50 - 70 %    Lymphocytes % 18 (L) 20 - 40 %    Monocytes % 11 (H) 2 - 10 %    Eosinophils % 1 0 - 6 %    Basophils % 1 0 - 2 %    Immature Granulocyte % 0 <=0 %    Neutrophils Absolute 3.7 2.0 - 7.7 thou/uL    Lymphocytes Absolute 1.0 0.8 - 4.4 thou/uL    Monocytes Absolute 0.6 0.0 - 0.9 thou/uL    Eosinophils Absolute 0.0 0.0 - 0.4 thou/uL     Basophils Absolute 0.0 0.0 - 0.2 thou/uL    Immature Granulocyte Absolute 0.0 <=0.0 thou/uL       Imaging    No results found.     Total time: 20 min; 15 min towards counseling/coordination of care    Signed by: Annabella Shafer, CNP

## 2021-06-13 NOTE — TELEPHONE ENCOUNTER
Patient's wife, Louisa, called and left message stating that patient has been having bouts of extreme pain and she is wondering if there is something stronger that she can give him. She asked for call back to [phone].    RN called Louisa back. She reports patient having intermittent groin pain, alternates between right and left groin, lower back pain, and rib pain. Patient denies lumps or swelling in groin.  She is wondering if there is something else she could be giving him for his pain?  Louisa said she has been going him (2) Tylenol ES, no more than 3 times per day, with improvement in his pain. She said the pain was intense this morning but after giving Tylenol, pain improved and he was able to eat good breakfast.    She reports he has CT scheduled for tomorrow and phone visit with Dr. Cox on 12/16.  She wondered why he needed CT scan? I explained to her when patient had labs drawn on 12/3, he had an increase in his Alk phos level and due to his weight loss, Annabella ordered CT make sure his cancer hasn't returned. She expresses concern that it is back.  I explained to her that CT will address the areas he is having pain.  She verbalized understanding.      I told her I would discuss our conversation with Annabella and call her back later this afternoon.  Message to BAIRON Shafer CNP/MARY Mello RN    12/14/20-Annabella said it is ok for balwinder tto take up to 3000 mg Tylenol/day until we have CT results back and he can be further assessed.  Called Louisa with this message. Instructed to take patient to ER, if patient becomes severe and not managed with Tylenol. She verbalized understanding/SAV Mello RN

## 2021-06-13 NOTE — PROGRESS NOTES
I was able to meet with Avi and review the treatment summary that I had sent via [employer], in its' entirety. He expressed understanding. I also discussed adv dir and he agreed to take the form and instructions. I congratulated him on his survivorship and thanked him for his time. Le RN, OCN, CBCN

## 2021-06-13 NOTE — PROGRESS NOTES
"Avi Villafana is a 80 y.o. male who is being evaluated via a billable video visit.      The patient has been notified of following:     \"This video visit will be conducted via a call between you and your physician/provider. We have found that certain health care needs can be provided without the need for an in-person physical exam.  This service lets us provide the care you need with a video conversation.  If a prescription is necessary we can send it directly to your pharmacy.  If lab work is needed we can place an order for that and you can then stop by our lab to have the test done at a later time.    Video visits are billed at different rates depending on your insurance coverage. Please reach out to your insurance provider with any questions.    If during the course of the call the physician/provider feels a video visit is not appropriate, you will not be charged for this service.\"    Patient has given verbal consent to a Video visit? Yes  How would you like to obtain your AVS? AVS Preference: MyChart.  If dropped by the video visit, the video invitation should be sent to: Send to e-mail at: [email or web address]  Will anyone else be joining your video visit? Yes, wife, Louisa      Video Start Time: 3:25 pm    Additional provider notes  Avi has lost about 5 pounds, however marked for added some supplements in his diet and now he has regained about a couple pounds.  Denies pain in his bones.  He has been urinating very well.    : GENERAL: Healthy, alert and no distress  EYES: Eyes grossly normal to inspection. No discharge or erythema, or obvious scleral/conjunctival abnormalities.  RESP: No audible wheeze, cough, or visible cyanosis.  No visible retractions or increased work of breathing.    NEURO: Cranial nerves grossly intact. Mentation and speech appropriate for age.  PSYCH: Mentation appears normal, affect normal/bright, judgement and insight intact, normal speech and appearance well-groomed    #.  Diffuse " skeletal lesions by CT scan, asymptomatic.  #.  New mild left hydronephrosis.  Possibly external compression with retroperitoneum tumor  #.  Adenocarcinoma of the GE junction/esophagus     I reviewed the CT scan report and images with the patient and his wife in detail.  I recommended additional evaluations as below.  Diffuse skeletal lesions are concerning for metastatic disease whether esophageal cancer recurrence or new finding of prostate cancer or similar.    -PSA  -PET/CT scan, whole body  -Referral to urology for new hydronephrosis.  -Follow-up with me a few days after completion of PET scan.    Video-Visit Details    Type of service:  Video Visit    Video End Time (time video stopped): 3:50 PM  Originating Location (pt. Location): Home    Distant Location (provider location):  [facility]     Platform used for Video Visit: Meliton Cox MD

## 2021-06-14 NOTE — TELEPHONE ENCOUNTER
Navigator called Louisa, pt's wife, following conversation with [facility] ([facility] agency. Eli at [facility] had suggested that writer offer hospice visit first because they can offer so many more services, but they can do home care if family is not ready.     Writer had detailed conversation with Louisa about the idea of hospice consult/conversation to learn more about the options and services. She prefers to start with home care, and wait until after their meeting with Dr. Cox on Thursday 12/31/20 when they will hear the interpretation of the PET scan and any further plans for his cancer treatment. She understands that hospice services may be the most appropriate next step, but needs to hear from Dr. Cox first.     She confirmed that she is comfortable with the pain medication plan she discussed with Jaki today, and verbalized her appreciation for the communication and care.     Writer called [facility] back, spoke to Eli, and gave her the pt's and wife's name and phone number. Called clinic and asked Abbie Pink to please fax HC orders to [facility] at [phone].    Will continue to follow.

## 2021-06-14 NOTE — TELEPHONE ENCOUNTER
"Navigator returned pt's mario's vm from this morning. Sohan has turned in paperwork for communication to be allowed with him.     Sohan is very concerned about the patient and his mother, pt's wife, Louisa. He states that patient rarely sleeps at all due to pain. He says they haven't had a night of sleep in over a week. His mother is trying to carry patient around the house because of his weakness. He feels something for pain that would allow pt to sleep at night is needed. \"If they have one good hour, my mom feels like they are succeeding.\"     Writer explained that I had talked with [facility] this morning, and the orders from Dr. Cox should be kept to be seen asap - even if they can't see pt within 24 hours. Writer stressed the priority of this need for patient care. They say they are accepting pts for this weekend start of care. Writer has also left message for [facility] (not hospice) to inquire about availability.     Writer explained what Jaki Mello had relayed to me:  If he is in uncontrolled pain they can call Jaki or clinic staff, call 911 to go to ED.   Writer stated that if he went to the ED, it does not mean he will certainly be admitted. Also - they can inform ED that family needs to be included in assessment and planning, as patient is not a good historian of his symptoms or condition.     In mentioning that appt on Thursday with Dr. Cox she will go over the PET scan - Sohan reports that they have seen the report, and know that cancer is throughout pt's body.     Writer brought up hospice - that they can meet with hospice service to understand the services prior to actually signing up. Suggested that to keep pt at home, they may need to have another family member move in for a while, as home hospice does not provide 24 hour care. Sohan says several times that they understand they may need to choose another type of care location/facility for him. It takes two of them to " "carry pt to shower.     Writer also suggested Palliative Care referral. Staff is changing, so unclear how scheduling is working, but Jaki or Dr. Lal can assist with symptom management, as well as end of life care decision making. He was open to this referral.     Sohan verbalized appreciation for support. He is very concerned. \"Avi is dying, and it (caring for him) is killing my mom.\"    Collaborating with Jaki Mello RN, Cancer Care  Writer will communicate with team when/if I hear anything further from home care agencies.     Please consider:   Palliative Care consult  Stronger pain medication for ability to sleep at night       "

## 2021-06-14 NOTE — TELEPHONE ENCOUNTER
Called and talked to Louisa today. She said they both slept really well last night.  She said Avi had pain this morning but napping comfortably now. They will be at his appt tomorrow with Dr. Cox. Son and grandson are driving with patient and Louisa here/MARY Mello RN

## 2021-06-14 NOTE — TELEPHONE ENCOUNTER
Navigator called and left messages for both pt's wife, Louisa, as well as step son, Sohan Parks. Offered support for the patient's transition to hospice. Left callback number.

## 2021-06-14 NOTE — PROGRESS NOTES
Pt was transported to 2nd floor hospital room 210 at [facility] after ns started at 75 per hour and dilaudid 0.5mb given at 1530. Pt much more comfortable after dilaudid. Report given to nurse Pickering. Dr Cox to order hospice or comfort care orders so pt family can visit him in the hospital. Pt 2 sons accompanied him here during visit. Cari Coe RN

## 2021-06-14 NOTE — TELEPHONE ENCOUNTER
Reason for Call:  Other      Detailed comments: Needing Death Certificate signed by PCP as soon as possible  Sent thru the [facility]  [facility]arl[facility]  Home [phone]        Call taken on 2021 at 10:27 AM by Laura L Goldberg

## 2021-06-14 NOTE — TELEPHONE ENCOUNTER
"Navigator became aware of this pt via request from clinic staff, Moni Pink, who had heard from Louisa's son.  Writer spoke with pt's wife, Louisa, who is very articulate about the patient's and her needs for his care.     Home care needs:  Pt has taken a significant turn for the worse in the last couple of weeks. Often he is very weak, needing help to get the bathroom and cannot shower without significant assistance. His memory/consciousness is variable. Unclear if this is associated with the cancer or the Parkinson's. She reports today is a bad day. He required help to the bathroom, and she could not handle what he would need to get a shower. Some days he does better. Her son, Sohan, is taking him to his PET scan today. She is exhausted from caring for patient at home, and the adult children are concerned for her.     Writer requests Home Care referral for RN and HHA. Writer called [facility] and learned that they cannot start care until mid-next week, and other agencies are the same. Please indicate start of care as 12/29/2020 or later, or orders will be denied. Indications for RN: Home safety, mental status changes, weakness, care management assessment.    Pain:   Louisa reports that pt's pain is quite variable. Most of the time the Tylenol regimen works well (6 Tylenol/day). Sometimes it does not, and the patient has significant pain. When asked if they have anything stronger for breakthrough pain, she states that her understanding is that \"he would have to go to the hospital\". She does not want him \"to be alone in the hospital\", and so they have not done that. She wants to be clear that this pain is intermittent. Sometimes patient can be up and around, in the kitchen cooking with her, etc. Writer assured her I would pass along this issue.     Nutrition:   She states that since pt is much more weak, he sits most of day. \"He has no appetite, but neither do I,\"  Which she partically attributes to less exercise. " She tries to be sure he gets 2 cans of Ensure each day.     Writer introduced self and navigation role. Will email navigation introduction letter. Described resources within cancer care program such as SW, Psych, Palliative Care, and others. Included in conversation holistic/integrative approaches, which she states they have included in their well being strategies. Pt has taken advantage of massage, chiropractic, and acupuncture, however most of these are not available due to Covid, or pt has missed appts based on his condition.     Will continue to follow pt.

## 2021-06-14 NOTE — TELEPHONE ENCOUNTER
"RN talked to Louisa and she said Avi's pain is controlled right now with (2) Tylenol three times a day and a heating pad. She said he is up walking and was sitting up watching tv when RN called. She said she felt comfortable with patient at home today.     RN did instruct her to call 911 if pain out of control, if extreme weakness or other concerns. She said she would do so.    She asked about doing a phone visit on Thurs and having her boys attend the appt in person on Thurs. RN strongly encouraged her to bring him to appt on Thurs so he can have physical assessment by Dr. Cox. She will have her son, Sohan, help get him in car and to his appt. RN told her if they need help getting him out of the car, once he gets here, we can help. RN feels Louisa needs to be present, in person, at the visit on Thurs, and her sons can be present. Dr. Cox ok'd this.    RN instructed Louisa to call if pain worsens or other concerns.  RN will call to check in on patient on Wed.    Home Health referral written by Dr. Cox.    RN called Louisa back.  Patient is sleeping comfortably right now. She asked him early today if he felt he needed to go to ER due to pain and he told her no, the pain was tolerable.    Louisa feels that the tylenol, heat, ice, and bengay are helping right now and doesn't feel he needs anything stronger.    Louisa reports pain reports pain intermittently to R scapula area, L lower abd (not using bengay there) and lower mid back.    Louisa said Sohan, her son, is \"worked up\" because he feels Louisa isn't sleeping. Louisa said they had the best night of sleep last night than they have had in a long time.     Louisa will call if pain worsens and the above measures aren't working. RN will call and check in on them on Wed/J. ROBERTO Mello   "

## 2021-06-14 NOTE — TELEPHONE ENCOUNTER
Pt mario Pinon called and was very concerned about Avi and his status. Avi is extremely weak and in a lot of pain. Sohan and his brother have been carrying him to the bathroom and taking turns staying there at night. Sohan thinks his mom is overwhelmed and minimizing the pain , she is afraid if Avi is hospitalized she will not be able to be with him in the end. Sohan feels that pt is a few days away from dying with the condition he is in. Pt Avi will be coming in at 3 today to see Dr Cox. Son will need help to get him out of the car into a wheelchair and will accompany him to see the doctor today. Hospice has been talked about very briefly but no appt for intake set up yet according to son. Family worried about the holiday weekend and pt not getting the help he needs because of that. Will update Dr Cox before appt. Cari Coe RN

## 2021-06-14 NOTE — PROGRESS NOTES
[facility] Hematology and Oncology Progress Note    Patient: Avi Villafana  MRN: [id number]  Date of Service: 12/31/2020        Reason for Visit    Chief Complaint   Patient presents with     HE Cancer       Assessment and Plan  Cancer Staging  Adenocarcinoma of esophagus (H)  Staging form: Esophagus - Adenocarcinoma, AJCC 8th Edition  - Clinical stage from 8/23/2019: Stage III (cT2, cN1, cM0, G2) - Signed by Gena Cowan MD on 8/23/2019      ECOG Performance   ECOG Performance Status: 3     Distress Assessment  Distress Assessment Score: Extreme distress    Pain       #.  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  Cancer related pain, diffuse.  #.  Acute kidney injury-likely prerenal from poor oral intake and dehydration or possibly obstructive uropathy.  #.  New mild left hydronephrosis with no apparent cause  #.  Parkinson disease  #.  Forgetfulness  #.  DNR     I reviewed the PET scan report and images and go over the area of abnormalities with him and his children in detail.  I explained to them that most likely recurrent of esophageal cancer.  PSA is normal.  There is a large burden of metastatic cancer.  His current performance status is 3-4.  I explained to them that additional work-up such as biopsy will only confirm the diagnosis.  Chemotherapy will be the option for metastatic esophageal cancer.  His current performance status is not adequate for consideration of chemotherapy at this point.  He has significant amount of pain throughout his body from most likely metastatic bone and lymph node disease.  I offered symptom focusd care under hospice.  Avi was very tearful, so as his wife, Louisa.  He voiced understanding the current clinical situation.     Because of his significant amount of pain, dehydration, I requested admission to the hospital for symptom management. I gave him Dilaudid IV 0.5 mg in the clinic and he noted good  improvement in pain.  I started gentle IV hydration.   Requested hospice evaluation.  He already have a setup with [facility] for home care and possibly transition to [facility] hospice services a potential option as well.     Addressed the CODE STATUS today.  Elected DNR.    Discussed with Dr. Sanabria, hospitalist and she kindly accepted the patient.    Problem List    1. Adenocarcinoma of esophagus (H)  HM1(CBC and Differential)    Comprehensive Metabolic Panel    PSA, Diagnostic (Prostatic-Specific Antigen)    HM1(CBC and Differential)    Comprehensive Metabolic Panel    CANCELED: PSA, Diagnostic (Prostatic-Specific Antigen)   2. Pain  HM1(CBC and Differential)    Comprehensive Metabolic Panel    PSA, Diagnostic (Prostatic-Specific Antigen)    HM1(CBC and Differential)    Comprehensive Metabolic Panel    CANCELED: PSA, Diagnostic (Prostatic-Specific Antigen)   3. Abnormal findings on diagnostic imaging of other abdominal regions, including retroperitoneum   PSA, Diagnostic (Prostatic-Specific Antigen)    CANCELED: PSA, Diagnostic (Prostatic-Specific Antigen)   4. Abnormal findings on diagnostic imaging of limbs   PSA, Diagnostic (Prostatic-Specific Antigen)   5. Bone lesion  PSA, Diagnostic (Prostatic-Specific Antigen)      ______________________________________________________________________________  Diagnosis  2019-admitted to bone invasive moderately differentiated adenocarcinoma of GE junction.  EUS findin mm in maximal thickness, T2.  Celiac axis was normal.  Multiple benign cysts in the liver.  A few regional lymph nodes were suspicious and 1 of the gastrohepatic lymph node was positive for metastatic adenocarcinoma.   -2019-PET scan showed distal esophageal carcinoma with paraesophageal and gastrohepatic ligament lymph node metastases above the level of celiac axis.  No evidence of distant metastases.   - no loss of MMR. HER2- negative. PDL1 CPS 2     12/10/2019- PET scan showed  marked interval response to therapy with minimal residual hypermetabolism at GE junction (SUV max 4.2, previously 26).    1/13/2020- EGD showed possible reflux or radiation induced esophagitis.  Biopsy taken at GE junction.  5 mm stomach ulcer, a few in the antrum.  No residual tumor noted in the distal esophagus/GE junction or gastric cardia.  The stomach biopsy showed ulcerative reactive gastropathy.  However GE junction biopsy showed focal residual moderately differentiated adenocarcinoma in the area of erosion/ulceration.    Treatment to date  9/5/2019-10/14/2019: concurrent chemotherapy and radiation 5040 cGy/28fx (carboplatin/paclitaxel- received 6 weekly doses)  2/26/2020- en bloc resection of the distal esophagus and gastric cardia by Dr. Alexandra. yPT0 N0    History of Present Illness    Avi presented today accompanied by his son, Joel and mario Parry.    There were multiple communication in the last few days because of Avi's significant clinical decline.  Admitted that his pain are everywhere in variable spot, or between the shoulder blades, upper back.  Extremely weak and need to assist for ADLs at home.  They have communicated with [facility] for home care starting tomorrow but unable to get adequate assistance from the family member anymore. His wife, Louisa is very overwhelmed with the amount of care he needs at this point.       Pain Status       Review of Systems    Oncology Nurse Assessment/CMA Intake: Constitutional  Constitutional (WDL): Exceptions to WDL  Fatigue: Fatigue not relieved by rest - Limiting instrumental ADL  Weight Loss: 10 - <20% from baseline, nutritional support indicated  Neurosensory  Neurosensory (WDL): Exceptions to WDL  Ataxia: Severe symptoms, limiting self care ADL, mechanical assistance indicated(sons carrying to the bathroom)  Eye   Eye Disorder (WDL): All eye disorder elements are within defined limits  Ear  Ear Disorder (WDL): All ear disorder elements are  within defined limits  Cardiovascular  Cardiovascular (WDL): All cardiovascular elements are within defined limits  Pulmonary  Respiratory (WDL): Exceptions to WDL  Dyspnea: Shortness of breath with moderate exertion  Gastrointestinal  Gastrointestinal (WDL): Exceptions to WDL  Anorexia: Oral intake altered without significant weight loss or malnutrition, oral nutritional supplements indicated  Genitourinary  Genitourinary (WDL): All genitourinary elements are within defined limits  Lymphatic  Lymph (WDL): All lymph disorder elements are within defined limits  Musculoskeletal and Connective Tissue  Musculoskeletal and Connetive Tissue Disorders (WDL): Exceptions to WDL  Arthralgia: Severe pain, limiting self care ADL  Bone Pain: Severe pain, limiting self care ADL(profound pain in back,, shoulders, chest)  Muscle Weakness : Limiting self care ADL, disabling  Myalgia: Severe pain, limiting self care ADL  Integumentary  Integumentary (WDL): All integumentary elements are within defined limits  Patient Coping  Patient Coping: Open/discussion;Accepting  Distress Assessment  Distress Assessment Score: Extreme distress  Accompanied by  Accompanied by: Alone  Oral Chemo Adherence         Past History  Past Medical History:   Diagnosis Date     Adenocarcinoma of esophagus (H) 8/9/2019     Anxiety      BPH (benign prostatic hyperplasia)      Essential hypertension      Family history of myocardial infarction     mom 50's     GERD (gastroesophageal reflux disease)      High cholesterol      History of anesthesia complications     memory problems-would like to discuss TIVA anesthesia     History of transfusion      Iron deficiency anemia due to chronic blood loss      Lipid disorder      Low back pain      Orthostatic dizziness      Parkinson disease (H)      Profound anemia      Sacroiliitis (H)        Physical Exam    Recent Vitals 12/31/2020   Weight -   BSA (m2) -   /77   Pulse 73   Temp 98   Temp src 1   SpO2 93    Some recent data might be hidden     General: Looks tired and confused.  Significant weight loss noted.  HEENT: Head: Normal, normocephalic, atraumatic.  Eye: Normal external eye, conjunctiva, lids cornea, MINH.  Ears:  Non-tender.  Nose: Normal external nose, mucus membranes and septum.  Pharynx: Dental Hygiene adequate. Normal buccal mucosa. Normal pharynx.  Tacky mucous membrane.  Neck / Thyroid: Supple, no masses, nodes, nodules or enlargement.  Lymphatics: No abnormally enlarged lymph nodes.  Chest: Normal chest wall and respirations. Clear to auscultation.  Heart: S1 S2 RRR, no murmur.   Abdomen: abdomen is soft without significant tenderness, masses, organomegaly or guarding  Extremities: normal strength, tone, and muscle mass  Skin: normal. no rash or abnormalities  CNS: non focal.    Lab Results    Recent Results (from the past 168 hour(s))   PSA, Diagnostic (Prostatic-Specific Antigen)   Result Value Ref Range    PSA 0.6 0.0 - 6.5 ng/mL   Comprehensive Metabolic Panel   Result Value Ref Range    Sodium 141 136 - 145 mmol/L    Potassium 4.4 3.5 - 5.0 mmol/L    Chloride 104 98 - 107 mmol/L    CO2 23 22 - 31 mmol/L    Anion Gap, Calculation 14 5 - 18 mmol/L    Glucose 84 70 - 125 mg/dL    BUN 35 (H) 8 - 28 mg/dL    Creatinine 2.75 (H) 0.70 - 1.30 mg/dL    GFR MDRD Af Amer 27 (L) >60 mL/min/1.73m2    GFR MDRD Non Af Amer 22 (L) >60 mL/min/1.73m2    Bilirubin, Total 0.6 0.0 - 1.0 mg/dL    Calcium 8.3 (L) 8.5 - 10.5 mg/dL    Protein, Total 6.6 6.0 - 8.0 g/dL    Albumin 3.2 (L) 3.5 - 5.0 g/dL    Alkaline Phosphatase 289 (H) 45 - 120 U/L    AST 20 0 - 40 U/L    ALT <9 0 - 45 U/L   HM1 (CBC with Diff)   Result Value Ref Range    WBC 7.5 4.0 - 11.0 thou/uL    RBC 4.58 4.40 - 6.20 mill/uL    Hemoglobin 12.9 (L) 14.0 - 18.0 g/dL    Hematocrit 40.3 40.0 - 54.0 %    MCV 88 80 - 100 fL    MCH 28.2 27.0 - 34.0 pg    MCHC 32.0 32.0 - 36.0 g/dL    RDW 15.0 (H) 11.0 - 14.5 %    Platelets 137 (L) 140 - 440 thou/uL    MPV  13.0 (H) 8.5 - 12.5 fL    Neutrophils % 72 (H) 50 - 70 %    Lymphocytes % 15 (L) 20 - 40 %    Monocytes % 9 2 - 10 %    Eosinophils % 1 0 - 6 %    Basophils % 1 0 - 2 %    Immature Granulocyte % 2 (H) <=0 %    Neutrophils Absolute 5.4 2.0 - 7.7 thou/uL    Lymphocytes Absolute 1.1 0.8 - 4.4 thou/uL    Monocytes Absolute 0.7 0.0 - 0.9 thou/uL    Eosinophils Absolute 0.1 0.0 - 0.4 thou/uL    Basophils Absolute 0.0 0.0 - 0.2 thou/uL    Immature Granulocyte Absolute 0.2 (H) <=0.0 thou/uL       Imaging    Ct Chest Abdomen Pelvis With Oral With Iv Cont    Result Date: 12/15/2020  EXAM: CT CHEST ABDOMEN PELVIS W ORAL W IV CONTRAST LOCATION: [facility] DATE/TIME: 12/15/2020 2:23 PM INDICATION: Follow-up GE junction cancer status post chemotherapy and radiation and resection. Weight loss. Early satiety. Elevated alkaline phosphatase. COMPARISON: 09/01/2020 CT. PET scan 12/10/2019 TECHNIQUE: CT scan of the chest, abdomen, and pelvis was performed before and after injection of IV contrast. Multiplanar reformats were obtained. Dose reduction techniques were used. CONTRAST: Iohexol (Omni) 100 mL FINDINGS: LUNGS AND PLEURA: Small right pleural effusion and very tiny sliver of a left pleural effusion with mild atelectasis both lung bases is stable. Lungs otherwise clear. MEDIASTINUM/AXILLAE: Postop distal esophagectomy with gastric pull-through. Nothing for local tumor recurrence. No lymphadenopathy some stable tiny lymph nodes in the upper right paratracheal mediastinum largest measures 8 mm on image 36. HEPATOBILIARY: Several simple cysts. No new liver lesions. No bile duct dilatation. PANCREAS: Normal. SPLEEN: Normal. ADRENAL GLANDS: Normal. KIDNEYS/BLADDER: Mild left hydronephrosis is new with no apparent cause. Transition point appears to be some upper retroperitoneal inflammatory stranding in the periaortic soft tissues but no masses. Parapelvic cysts right kidney unchanged. 1 mm nonobstructing stone  mid left kidney. BOWEL: Normal. LYMPH NODES: No lymphadenopathy. VASCULATURE: Moderate plaque. No aneurysms. PELVIC ORGANS: Normal. MUSCULOSKELETAL: There are new several faint sclerotic bone lesions throughout the visualized skeleton should represent new skeletal metastases.     1.  New faint sclerotic bone lesions throughout the visualized skeleton consistent with new diffuse skeletal metastases. 2.  Postop change distal esophagus with gastric pull-through with nothing for local tumor recurrence and no other findings concerning for distant metastatic disease. 3.  New mild left hydronephrosis with no apparent cause. Transition point in the retroperitoneum with or some faint generalized soft tissue stranding suggestive of edema. Given the mild obstruction to the ureter, however, this potentially could represent  some infiltrative tumor. 4.  Consider urologic consultation. NOTE: ABNORMAL REPORT THE DICTATION ABOVE DESCRIBES AN ABNORMALITY FOR WHICH FOLLOW-UP IS NEEDED.     Nm Pet Ct Whole Body    Result Date: 12/23/2020  EXAM: NM PET CT WHOLE BODY LOCATION: [facility] DATE/TIME: 12/23/2020 2:32 PM INDICATION: Subsequent treatment planning and restaging for malignant neoplasm of lower third of esophagus. Status post chemoradiation completed in October 2019. COMPARISON: CT of the chest abdomen pelvis dated 12/18/2020 TECHNIQUE: Serum glucose level 90 mg/dL. One hour post intravenous administration of 9.1 mCi F-18 FDG, PET imaging was performed from the skull vertex to feet utilizing attenuation correction with concurrent axial CT and PET/CT image fusion. Dose reduction techniques were used. FINDINGS: Development of mildly FDG avid right upper paratracheal station 2R (max SUV 5.3), subcarinal station 7 (max SUV 3.7), right hilar station 10 (max SUV 3.7) and bilateral retroperitoneal (max SUV 5.5) lymph nodes with innumerable osseous lesions throughout the axial and proximal appendicular skeleton  including examples in the right skull base (max SUV 5.7) sternum (max SUV 10.9) diffusely throughout the pelvis (max SUV 14.8 in the left posterior iliac bone), and right greater than left proximal femur (max SUV 10.4) suspicious for widely disseminated disease recurrence. Mild senescent intracranial changes. Mild carotid artery bifurcation calcification. Right chest port with tip terminating near the superior cavoatrial junction. Esophagectomy with gastric pull-through. Right greater than left non-FDG avid pleural effusions. Liver cysts. Cholelithiasis. Moderate left hydronephrosis. Punctate nonobstructing renal calculi. Pelvic phleboliths. Multilevel degenerative changes of the spine.     Findings suspicious for widely disseminated disease recurrence involving lymph nodes above and below the diaphragm as well as innumerable lesions throughout the osseous structures.      Signed by: Spencer Cox MD

## 2021-06-14 NOTE — TELEPHONE ENCOUNTER
Requesting new verbal order for start of care assessment to be completed on 1/2/21 due to agency capacity and patient/family agreeable.     This is outside of the original 24 hour referral, requiring a new verbal order.      Disciplines ordered: , OMA    Thank you    Unruly Peralta, RN  Clinical Coordinator  [facility]

## 2021-06-16 NOTE — TELEPHONE ENCOUNTER
Telephone Encounter by Deja Jones RN at 11/15/2019  9:36 AM     Author: Deja Jones RN Service: -- Author Type: Registered Nurse    Filed: 11/15/2019  9:37 AM Encounter Date: 11/14/2019 Status: Signed    : Deja Jones RN (Registered Nurse)       Cancer Care Refill Protocol Passed   omeprazole (PRILOSEC) 20 MG capsule [Pharmacy Med Name: OMEPRAZOLE 20MG CAPSULES]   Rerun Protocol (11/14/2019 8:02 PM)      Cancer Care visit in the last 12 months      Last office visit: 10/24/2019 Juancho Morales CNP Next office visit within 3 mo: 11/26/2019 Juancho Morales CNP  Last MTM visit: Visit date not found    Prescriber or current provider: Juancho Morales CNP  Last diagnosis associated with med order:   1. Melena  - omeprazole (PRILOSEC) 20 MG capsule [Pharmacy Med Name: OMEPRAZOLE 20MG CAPSULES]; TAKE 1 CAPSULE BY MOUTH TWICE DAILY BEFORE MEALS  Dispense: 90 capsule; Refill: 0

## 2021-06-17 NOTE — PATIENT INSTRUCTIONS - HE
Patient Instructions by Jenna Rashid RN at 9/5/2019  9:15 AM     Author: Jenna Rashid RN Service: -- Author Type: Registered Nurse    Filed: 9/5/2019  2:39 PM Encounter Date: 9/5/2019 Status: Signed    : Jenna Rashid RN (Registered Nurse)       Patient Education     Discharge Instructions for Chemotherapy  Your healthcare provider prescribed a type of medicine therapy for you called chemotherapy. Healthcare providers prescribe chemotherapy for many different types of illnesses, including cancer. There are many types of chemotherapy. This sheet provides general guidelines on how you can take care of yourself after your chemotherapy.  Mouth care  Dont be discouraged if you get mouth sores, even if you are following all your healthcare providers instructions. Many people get mouth sores as a side effect of chemotherapy. Heres what you can do to prevent mouth sores:    Keep your mouth clean. Brush your teeth with a soft-bristle toothbrush after every meal.    Ask if you should use a toothpaste with fluoride, or a mixture of 1 teaspoon of salt in 8-ounces of water to brush your teeth.     Use an oral swab or special soft toothbrush if your gums bleed during regular brushing.    Don't use dental floss if it causes your gums to bleed.    Use any mouthwashes given to you as directed.    If you cant tolerate regular methods, use salt and baking soda to clean your mouth. Mix 1 teaspoon of salt and 1 teaspoon of baking soda in 1 quart of warm water. Swish and spit.    If you wear dentures, you may be told to wear them only when you eat, ask your healthcare provider. Clean dentures twice a day and soak in antimicrobial solution when you aren't wearing them. Rinse your mouth after each meal.     Watch your mouth and tongue for white patches. This may be a sign of a type of yeast infection (thrush), a common side effect of chemotherapy. Be sure to tell your healthcare provider about these patches. Medicine can  be prescribed to treat it.  Other home care  Here's what else you can do:    Try to exercise. Exercise keeps you strong and keeps your heart and lungs active. Walking and yoga are good types of exercise.     Keep clean. During chemotherapy, your body cant fight infection very well. Take short baths or showers.  ? Wash your hands before you eat and after going to the bathroom.  ? Use moisturizing soap. Chemotherapy can make your skin dry.  ? Apply moisturizing lotion several times a day to help relieve dry skin.  ? Dont take very hot or very cold showers or baths.    Dont be surprised if your chemotherapy causes slight burns to your skin--usually on the hands and feet. Some medicines used in high doses cause this to happen. Ask for a special cream to help relieve the burn and protect your skin.    Avoid people who are sick with illnesses and diseases you could catch, such as colds, flu, measles, or chicken pox as well as people who have recently had vaccinations for these illnesses.     Let your healthcare provider know if your throat is sore. You may have an infection that needs treatment.    Remember, many patients feel sick and lose their appetites during treatment. Eat small meals several times a day to keep your strength up:  ? Choose bland foods with little taste or smell if you are reacting strongly to food.  ? Be sure to cook all food thoroughly. This kills bacteria and helps you avoid infection.  ? Eat foods that are soft. Soft foods are less likely to cause stomach irritation.  ? Try to eat a variety of foods for a well-balanced diet. Drink plenty of fluids and eat foods with fiber to avoid constipation.   When to call your healthcare provider  Call your healthcare provider right away if you have any of the following:    Unexplained bleeding    Trouble concentrating    Ongoing fatigue    Shortness of breath, wheezing, trouble breathing, or bad cough    Rapid, irregular heartbeat, or chest pain    Dizziness,  lightheadedness    Constant feeling of being cold    Hives or a cut or rash that swells, turns red, feels hot or painful, or begins to ooze    Burning when you urinate    Fever of 100.4 F (38 C) or higher, or as directed by your healthcare provider   Date Last Reviewed: 5/1/2016 2000-2017 [employer]. [address] [place] [address]. All rights reserved. This information is not intended as a substitute for professional medical care. Always follow your healthcare professional's instructions.

## 2021-06-17 NOTE — PATIENT INSTRUCTIONS - HE
Patient Instructions by Spencer Cox MD at 8/16/2019  2:45 PM     Author: Spencer Cox MD Service: -- Author Type: Physician    Filed: 8/16/2019  3:56 PM Encounter Date: 8/16/2019 Status: Signed    : Spencer Cox MD (Physician)         Paclitaxel injection  Brand Names: Onxol, Taxol  What is this medicine?  PACLITAXEL (CYNDI li TAX el) is a chemotherapy drug. It targets fast dividing cells, like cancer cells, and causes these cells to die. This medicine is used to treat ovarian cancer, breast cancer, and other cancers.  How should I use this medicine?  This drug is given as an infusion into a vein. It is administered in a hospital or clinic by a specially trained health care professional.  Talk to your pediatrician regarding the use of this medicine in children. Special care may be needed.  What side effects may I notice from receiving this medicine?  Side effects that you should report to your doctor or health care professional as soon as possible:    allergic reactions like skin rash, itching or hives, swelling of the face, lips, or tongue    low blood counts - This drug may decrease the number of white blood cells, red blood cells and platelets. You may be at increased risk for infections and bleeding.    signs of infection - fever or chills, cough, sore throat, pain or difficulty passing urine    signs of decreased platelets or bleeding - bruising, pinpoint red spots on the skin, black, tarry stools, nosebleeds    signs of decreased red blood cells - unusually weak or tired, fainting spells, lightheadedness    breathing problems    chest pain    high or low blood pressure    mouth sores    nausea and vomiting    pain, swelling, redness or irritation at the injection site    pain, tingling, numbness in the hands or feet    slow or irregular heartbeat    swelling of the ankle, feet, hands  Side effects that usually do not require medical attention (report to your doctor or health care  professional if they continue or are bothersome):    bone pain    complete hair loss including hair on your head, underarms, pubic hair, eyebrows, and eyelashes    changes in the color of fingernails    diarrhea    loosening of the fingernails    loss of appetite    muscle or joint pain    red flush to skin    sweating  What may interact with this medicine?  Do not take this medicine with any of the following medications:    disulfiram    metronidazole  This medicine may also interact with the following medications:    cyclosporine    diazepam    ketoconazole    medicines to increase blood counts like filgrastim, pegfilgrastim, sargramostim    other chemotherapy drugs like cisplatin, doxorubicin, epirubicin, etoposide, teniposide, vincristine    quinidine    testosterone    vaccines    verapamil  Talk to your doctor or health care professional before taking any of these medicines:    acetaminophen    aspirin    ibuprofen    ketoprofen    naproxen  What if I miss a dose?  It is important not to miss your dose. Call your doctor or health care professional if you are unable to keep an appointment.  Where should I keep my medicine?  This drug is given in a hospital or clinic and will not be stored at home.  What should I tell my health care provider before I take this medicine?  They need to know if you have any of these conditions:    blood disorders    irregular heartbeat    infection (especially a virus infection such as chickenpox, cold sores, or herpes)    liver disease    previous or ongoing radiation therapy    an unusual or allergic reaction to paclitaxel, alcohol, polyoxyethylated castor oil, other chemotherapy agents, other medicines, foods, dyes, or preservatives    pregnant or trying to get pregnant    breast-feeding  What should I watch for while using this medicine?  Your condition will be monitored carefully while you are receiving this medicine. You will need important blood work done while you are  taking this medicine.  This medicine can cause serious allergic reactions. To reduce your risk you will need to take other medicine(s) before treatment with this medicine. If you experience allergic reactions like skin rash, itching or hives, swelling of the face, lips, or tongue, tell your doctor or health care professional right away.  In some cases, you may be given additional medicines to help with side effects. Follow all directions for their use.  This drug may make you feel generally unwell. This is not uncommon, as chemotherapy can affect healthy cells as well as cancer cells. Report any side effects. Continue your course of treatment even though you feel ill unless your doctor tells you to stop.  Call your doctor or health care professional for advice if you get a fever, chills or sore throat, or other symptoms of a cold or flu. Do not treat yourself. This drug decreases your body's ability to fight infections. Try to avoid being around people who are sick.  This medicine may increase your risk to bruise or bleed. Call your doctor or health care professional if you notice any unusual bleeding.  Be careful brushing and flossing your teeth or using a toothpick because you may get an infection or bleed more easily. If you have any dental work done, tell your dentist you are receiving this medicine.  Avoid taking products that contain aspirin, acetaminophen, ibuprofen, naproxen, or ketoprofen unless instructed by your doctor. These medicines may hide a fever.  Do not become pregnant while taking this medicine. Women should inform their doctor if they wish to become pregnant or think they might be pregnant. There is a potential for serious side effects to an unborn child. Talk to your health care professional or pharmacist for more information. Do not breast-feed an infant while taking this medicine.  Men are advised not to father a child while receiving this medicine.  This product may contain alcohol. Ask your  pharmacist or healthcare provider if this medicine contains alcohol. Be sure to tell all healthcare providers you are taking this medicine. Certain medicines, like metronidazole and disulfiram, can cause an unpleasant reaction when taken with alcohol. The reaction includes flushing, headache, nausea, vomiting, sweating, and increased thirst. The reaction can last from 30 minutes to several hours.  NOTE:This sheet is a summary. It may not cover all possible information. If you have questions about this medicine, talk to your doctor, pharmacist, or health care provider. Copyright  2018 [employer]           Carboplatin injection  Brand Name: Paraplatin  What is this medicine?  CARBOPLATIN (KAVITA magalys edwin tin) is a chemotherapy drug. It targets fast dividing cells, like cancer cells, and causes these cells to die. This medicine is used to treat ovarian cancer and many other cancers.  How should I use this medicine?  This drug is usually given as an infusion into a vein. It is administered in a hospital or clinic by a specially trained health care professional.  Talk to your pediatrician regarding the use of this medicine in children. Special care may be needed.  What side effects may I notice from receiving this medicine?  Side effects that you should report to your doctor or health care professional as soon as possible:    allergic reactions like skin rash, itching or hives, swelling of the face, lips, or tongue    signs of infection - fever or chills, cough, sore throat, pain or difficulty passing urine    signs of decreased platelets or bleeding - bruising, pinpoint red spots on the skin, black, tarry stools, nosebleeds    signs of decreased red blood cells - unusually weak or tired, fainting spells, lightheadedness    breathing problems    changes in hearing    changes in vision    chest pain    high blood pressure    low blood counts - This drug may decrease the number of white blood cells, red blood cells and  platelets. You may be at increased risk for infections and bleeding.    nausea and vomiting    pain, swelling, redness or irritation at the injection site    pain, tingling, numbness in the hands or feet    problems with balance, talking, walking    trouble passing urine or change in the amount of urine  Side effects that usually do not require medical attention (report to your doctor or health care professional if they continue or are bothersome):    hair loss    loss of appetite    metallic taste in the mouth or changes in taste  What may interact with this medicine?    medicines for seizures    medicines to increase blood counts like filgrastim, pegfilgrastim, sargramostim    some antibiotics like amikacin, gentamicin, neomycin, streptomycin, tobramycin    vaccines  Talk to your doctor or health care professional before taking any of these medicines:    acetaminophen    aspirin    ibuprofen    ketoprofen    naproxen  What if I miss a dose?  It is important not to miss a dose. Call your doctor or health care professional if you are unable to keep an appointment.  Where should I keep my medicine?  This drug is given in a hospital or clinic and will not be stored at home.  What should I tell my health care provider before I take this medicine?  They need to know if you have any of these conditions:    blood disorders    hearing problems    kidney disease    recent or ongoing radiation therapy    an unusual or allergic reaction to carboplatin, cisplatin, other chemotherapy, other medicines, foods, dyes, or preservatives    pregnant or trying to get pregnant    breast-feeding  What should I watch for while using this medicine?  Your condition will be monitored carefully while you are receiving this medicine. You will need important blood work done while you are taking this medicine.  This drug may make you feel generally unwell. This is not uncommon, as chemotherapy can affect healthy cells as well as cancer cells.  Report any side effects. Continue your course of treatment even though you feel ill unless your doctor tells you to stop.  In some cases, you may be given additional medicines to help with side effects. Follow all directions for their use.  Call your doctor or health care professional for advice if you get a fever, chills or sore throat, or other symptoms of a cold or flu. Do not treat yourself. This drug decreases your body's ability to fight infections. Try to avoid being around people who are sick.  This medicine may increase your risk to bruise or bleed. Call your doctor or health care professional if you notice any unusual bleeding.  Be careful brushing and flossing your teeth or using a toothpick because you may get an infection or bleed more easily. If you have any dental work done, tell your dentist you are receiving this medicine.  Avoid taking products that contain aspirin, acetaminophen, ibuprofen, naproxen, or ketoprofen unless instructed by your doctor. These medicines may hide a fever.  Do not become pregnant while taking this medicine. Women should inform their doctor if they wish to become pregnant or think they might be pregnant. There is a potential for serious side effects to an unborn child. Talk to your health care professional or pharmacist for more information. Do not breast-feed an infant while taking this medicine.  NOTE:This sheet is a summary. It may not cover all possible information. If you have questions about this medicine, talk to your doctor, pharmacist, or health care provider. Copyright  2018 [employer]

## 2021-06-19 NOTE — LETTER
Letter by Shyla Kan RN at      Author: Shyla Kan RN Service: -- Author Type: --    Filed:  Encounter Date: 8/12/2019 Status: (Other)       Dear Avi Villafana    Thank you for choosing [facility] for your care.  We are committed to providing you with the highest quality and compassionate healthcare services.  The following information pertains to your first appointment with our clinic.    Date/Time of appointment:  Friday August 16th at 2:30 pm.  This allows time to complete forms, possible labs and nursing assessment.     Name of your Physician: Spencer Cox MD    What to bring to your appointment:    Completed Patient History/Initial Nursing Assessment and Medication/Allergy List (these forms were sent to you).    Any paperwork or films from your physician that we have asked you to bring.    Your current insurance card(s).    Parking:    Please refer to the map included to direct you.  The [facility] is located at the [address] end of [facility] in [place], [place].      After turning onto [address] from [facility], take a right turn at the first stop sign.  We have designated parking on the left, identified as parking for Cancer Care patients (Lot D).     The Code to Enter Lot D is: 0801. This code changes monthly and will always coincide with the current month followed by 01. For example August will be 0801.  The month will continue to change but the 01 will remain constant.  If lot D is full please use Parking Lot A, directly across the street.    Please enter the Cancer Care Center on the north end of the \[facility]\"".  You will see a sign on the building.        For Medical Oncology or Hematology appointments, please take the elevator to the second floor to check in.   For Radiation Oncology appointments, please go straight through the double doors and check in.     Also please note appointments can last 1.5-2 hours.      We hope these instructions are helpful to  you.  If you have any questions or concerns, please call us at [phone].  It is our pleasure to assist you.    Warm Regards,  Shyla Kan  Nurse Navigator  [phone]

## 2021-06-20 NOTE — LETTER
"Letter by Maribel Jain CNP at      Author: Maribel Jain CNP Service: -- Author Type: --    Filed:  Encounter Date: 3/11/2020 Status: (Other)         Patient: Avi Villafana   MR Number: [id number]   YOB: 1940   Date of Visit: 3/11/2020       [facility]      NAME:  Avi Villafana             :  1940    MRN: [id number]    CODE STATUS:  FULL CODE    FACILITY: [facility] [[id number]]         CHIEF COMPLAIN/REASON FOR VISIT:  Chief Complaint   Patient presents with   ? Review Of Multiple Medical Conditions     see HPI       HISTORY OF PRESENT ILLNESS: Avi Villafana is a 80 y.o. male being seen seen today on the TCU for review of multiple medical condiitons s/p hospitalization for esophageal CA. He was at [facility] from  to 3/9 2020 . Per his EMR, \"with a medical history significant for Parkinson disease, dyslipidemia, anxiety and depression who underwent planned esophagogastrectomy for treatment of adenocarcinoma of the esophagus on .  He is discharged to a skilled nursing facility for ongoing rehabilitative therapy.\". He is up in a chair in common area looking at [employer] new paper. Reports  and lives in [place]. He does have Parkinsons, frozen face like mask and speech hesitant. He reports some pain to abdominal incisional site, but med regime manages pain.Nursing reported he had a difficult night due to pain and was up/awake a a lot. Sleepy this am when seen, did get oxycodone from RN on duty and some voltaren ointment to back.      Allergies   Allergen Reactions   ? Latex      Added based on information entered during case entry, please review and add reactions, type, and severity as needed   ? Latex Rash   :     Current Outpatient Medications   Medication Sig   ? acetaminophen (TYLENOL) 500 MG tablet Take 2 tablets (1,000 mg total) by mouth every 6 (six) hours as needed for pain or fever.   ? bisacodyL (DULCOLAX) 10 mg " suppository Insert suppository rectally daily as needed for treatment of constipation.   ? carbidopa-levodopa (SINEMET)  mg per tablet Take 1 tablet by mouth 3 (three) times a day. Take at 7am, noon, and 5-7pm   ? escitalopram oxalate (LEXAPRO) 5 MG tablet Take 1 tablet by mouth daily.   ? finasteride (PROSCAR) 5 mg tablet Take 5 mg by mouth daily.   ? lidocaine-prilocaine (EMLA) cream Apply 1 application topically as needed. Apply to port 1/2 to 1 hour prior to accessing port.   ? magnesium hydroxide (MILK OF MAG) 400 mg/5 mL Susp suspension Take 30 mL by mouth daily as needed.   ? omeprazole 20 mg TbEC Take 20 mg by mouth daily.   ? ondansetron (ZOFRAN) 4 MG tablet Take 4 mg by mouth every 6 (six) hours as needed for nausea.   ? oxyCODONE (ROXICODONE) 5 MG immediate release tablet 0.5 tablets (2.5 mg total) by J-Tube route every 8 (eight) hours as needed for pain.   ? potassium citrate (UROCIT-K) 10 mEq (1,080 mg) SR tablet Take 20 mEq by mouth 2 (two) times a day.   ? QUEtiapine (SEROQUEL) 25 MG tablet Take 0.5 tablets (12.5 mg total) by mouth at bedtime as needed (agitation).   ? rosuvastatin (CRESTOR) 20 MG tablet Take 20 mg by mouth at bedtime.    ? tamsulosin (FLOMAX) 0.4 mg Cp24 Take 0.4 mg by mouth Daily after breakfast.                REVIEW OF SYSTEMS:    Currently, no fever, chills, or rigors. Does not have any visual or hearing problems. Denies any chest pain, headaches, palpitations, lightheadedness, dizziness, shortness of breath, or cough. Appetite is good. Denies any GERD symptoms. Denies any difficulty with swallowing, nausea, or vomiting.  Denies any abdominal pain, diarrhea or constipation. Denies any urinary symptoms. No insomnia. No active bleeding. No rash.       PHYSICAL EXAMINATION:  Vitals:    03/11/20 1805   BP: 119/78   Pulse: 78   Temp: 98.6  F (37  C)   Weight: 190 lb (86.2 kg)         GENERAL: Awake, Alert, oriented x3, not in any form of acute distress, answers questions  appropriately, follows simple commands, conversant  HEENT: Head is normocephalic with normal hair distribution. No evidence of trauma. Ears: No acute purulent discharge. Eyes: Conjunctivae pink with no scleral jaundice. Nose: Normal mucosa and septum. NECK: Supple with no cervical or supraclavicular lymphadenopathy. Trachea is midline.   CHEST: No tenderness or deformity, no crepitus  LUNG: Clear to auscultation with good chest expansion. There are no crackles or wheezes, normal AP diameter.  BACK: No kyphosis of the thoracic spine. Symmetric, no curvature, ROM normal, no CVA tenderness, no spinal tenderness   CVS: There is good S1  S2,rhythm is regular.  ABDOMEN: Soft, + BS.UMBILICUS INCISION WITH STABLES, NO DRAINAGE, RIGHT FLANK AREA DRESSING S/P ct, gTUBE IN PLACE.  EXTREMITIES: Atraumatic. Full range of motion on both upper and lower extremities, there is no tenderness to palpation, no pedal edema, no cyanosis or clubbing, no calf tenderness, normal cap refill, no joint swelling.  SKIN: Warm and dry, no erythema noted, no rashes or lesions.  NEUROLOGICAL: The patient is oriented to person, place and time. Generalized weakness, Parkinsons, hesitant speech  Fine tremor        LABS:    Lab Results   Component Value Date    WBC 9.5 02/28/2020    HGB 10.6 (L) 02/28/2020    HCT 32.0 (L) 02/28/2020    MCV 90 02/28/2020     03/03/2020       Results for orders placed or performed in visit on 02/10/20   Basic Metabolic Panel   Result Value Ref Range    Sodium 143 136 - 145 mmol/L    Potassium 4.3 3.5 - 5.0 mmol/L    Chloride 108 (H) 98 - 107 mmol/L    CO2 25 22 - 31 mmol/L    Anion Gap, Calculation 10 5 - 18 mmol/L    Glucose 87 70 - 125 mg/dL    Calcium 9.0 8.5 - 10.5 mg/dL    BUN 20 8 - 28 mg/dL    Creatinine 1.12 0.70 - 1.30 mg/dL    GFR MDRD Af Amer >60 >60 mL/min/1.73m2    GFR MDRD Non Af Amer >60 >60 mL/min/1.73m2           No results found for: HGBA1C  No results found for: ZZBHLBBE39PM  Lab Results    Component Value Date    [id number] 323 08/06/2019       ASSESSMENT/PLAN:  1. Malignant neoplasm of esophagus, unspecified location (H)    2. Pain      1. Esophageal CA: S/P surgery, weakened condition. Will need SN for pain management, wound management and chronic medical condition management. PT/OT for therapies due to ongoing therapies due to decondtioned state. FU with surgeon and oncology.    2. Pain: Increase in pain, we are adding vistaril to scheduled Tylenol for better coverage. Also voltaren gel to right back flank area. Nursing to monitor as he has prn oxycodone ordered as well.      Electronically signed by:  Maribel Jain CNP  This progress note was completed using Dragon software and there may be grammatical errors.

## 2021-06-20 NOTE — LETTER
Letter by Maribel Jain CNP at      Author: Maribel Jain CNP Service: -- Author Type: --    Filed:  Encounter Date: 3/25/2020 Status: (Other)         Patient: Avi Villafana   MR Number: [id number]   YOB: 1940   Date of Visit: 3/25/2020     [facility]      NAME:  Avi Villafana             :  1940  MRN: [id number]  CODE STATUS:  FULL CODE    VISIT TYPE: DISCHARGE SUMMARY  FACILYTY: [facility] [[id number]]    HOSPITALIZATION:   [facility] from  to 3/9 2020                 PRIMARY CARE PROVIDER: Tad Tovar MD    DISCHARGE DIAGNOSIS:      1. Adenocarcinoma of esophagus (H)    2. Malignant neoplasm of esophagus, unspecified location (H)    3. Pain         DISCHARGE MEDICATIONS:         Medication List          Accurate as of 2020  7:25 PM. If you have any questions, ask your nurse or doctor.            CHANGE how you take these medications    acetaminophen 500 MG tablet  Commonly known as:  TYLENOL  Take 2 tablets (1,000 mg total) by mouth every 6 (six) hours as needed for pain or fever.  What changed:      when to take this    additional instructions        CONTINUE taking these medications    bisacodyL 10 mg suppository  Commonly known as:  DULCOLAX  Insert suppository rectally daily as needed for treatment of constipation.     carbidopa-levodopa  mg per tablet  Commonly known as:  SINEMET     diclofenac sodium 1 % Gel  Commonly known as:  VOLTAREN     finasteride 5 mg tablet  Commonly known as:  PROSCAR     hydrOXYzine pamoate 25 MG capsule  Commonly known as:  VISTARIL     Lexapro 5 MG tablet  Generic drug:  escitalopram oxalate     lidocaine-prilocaine cream  Commonly known as:  EMLA     magnesium hydroxide 400 mg/5 mL Susp suspension  Commonly known as:  MILK OF MAG  Take 30 mL by mouth daily as needed.     omeprazole 20 mg Tbec     ondansetron 4 MG tablet  Commonly known as:  ZOFRAN     oxyCODONE 5 MG immediate  "release tablet  Commonly known as:  ROXICODONE  0.5 tablets (2.5 mg total) by J-Tube route every 8 (eight) hours as needed for pain.     potassium citrate 10 mEq (1,080 mg) SR tablet  Commonly known as:  UROCIT-K     QUEtiapine 25 MG tablet  Commonly known as:  SEROquel  Take 0.5 tablets (12.5 mg total) by mouth at bedtime as needed (agitation).     rosuvastatin 20 MG tablet  Commonly known as:  CRESTOR     tamsulosin 0.4 mg Cap  Commonly known as:  FLOMAX            HISTORY OF PRESENT ILLNESS: Avi Villafana is a 80 y.o. male being seen for a face to face visit for an anticipated am dc.He requires [facility] for RN support.  His wife will need Gtube training prior to dc, but facility on [facility] as per MD so was not been able to train.. Nursing to train in am.  He was at [facility] from 2/26 to 3/9 2020 . Per his EMR, \"with a medical history significant for Parkinson disease, dyslipidemia, anxiety and depression who underwent planned esophagogastrectomy for treatment of adenocarcinoma of the esophagus on 2/26.  He is discharged to a skilled nursing facility for ongoing rehabilitative therapy.\". He is up in a chair in common area looking at todays new paper. Reports  and lives in [place]. He does have Parkinsons, frozen face like mask and speech hesitant. He reports pain improvement, is up as larry ambulating.    SKILLED NURSING FACILITY COURSE:  During this TCU stay, patient completed all anticipated goals of therapy.      PHYSICAL EXAMINATION:    Vitals:    03/25/20 1854   BP: 127/80   Pulse: 82   Temp: 98.5  F (36.9  C)   Weight: 189 lb 3.2 oz (85.8 kg)         GENERAL: Awake, Alert, oriented x3, not in any form of acute distress, answers questions appropriately, follows simple commands, conversant  HEENT: Head is normocephalic with normal hair distribution. No evidence of trauma. Ears: No acute purulent discharge. Eyes: Conjunctivae pink with no scleral jaundice. Nose: Normal mucosa and septum. NECK: " Supple with no cervical or supraclavicular lymphadenopathy. Trachea is midline.   CHEST: No tenderness or deformity, no crepitus  LUNG: Clear to auscultation with good chest expansion. There are no crackles or wheezes, normal AP diameter.  BACK: No kyphosis of the thoracic spine. Symmetric, no curvature, ROM normal, no CVA tenderness, no spinal tenderness   CVS: There is good S1  S2,  rhythm is regular.  ABDOMEN: Midline umbilicus incision well healed and aproximated, he has Gtube on left side of abdomen, dressed.  EXTREMITIES: Atraumatic. Full range of motion on both upper and lower extremities, there is no tenderness to palpation, no pedal edema, no cyanosis or clubbing, no calf tenderness, normal cap refill, no joint swelling.  SKIN: Warm and dry, no erythema noted, no rashes or lesions.  NEUROLOGICAL: The patient is oriented to person, placeStrength and sensation are grossly intact. Face is symmetric.      LABS:  All labs reviewed in the nursing home record.        DISCHARGE PLAN: I certify that this patient is under Dr. Rueda's care, seen by the NP, and had a face-to-face encounter that meets the physician face-to-face encounter requirements.  The encounter was in whole, or part related to the primary reason for home health.  The Patient is homebound due to: General deconditioned state sp surgery for esouphegeal CA and i it is  taxing and it will take a considerable amount of effort for patient to leave the home.  He is dependent on others for transportation.  The patient is confined to his home and needs intermittent skilled nursin RN,  The patient has been under the care of Dr. Rueda/NP and Dr. Rueda  initiated the establishment of the plan of care.        Patient to be followed by home care for physical therapy to eval and treat for strengthening, balance, endurance, and safety with mobility, and ambulation.  Patient to be followed by home care for occupational therapy to eval and treat for strengthening,  ADL needs, adaptive equipment, and safety.  Patient to be followed by home care for nursing services for medication set up and teaching, symptom and disease processes monitoring and education.    Patient to be followed by home care for home health aid services for bathing and ADL needs.  Planned discharge.  All therapy goals have been met.  Family will assist with discharge and transportation.        Patient will follow up with PCP within 7- days after discharge for medication mangagment and appropriate lab studies.    Post Discharge Medication Reconciliation Status: discharge medications reconciled and changed, per note/orders (see AVS)  Electronically signed by:  Maribel Jain CNP  This progress note was completed using Dragon software and there may be grammatical errors.      For documentation purposes, chart review, medication management, and discharge coordination of care was greater than 35 minutes

## 2021-06-20 NOTE — LETTER
Letter by Yumiko Rueda MBBS at      Author: Yumiko Rueda MBBS Service: -- Author Type: --    Filed:  Encounter Date: 3/17/2020 Status: (Other)         Patient: Avi Villafana   MR Number: [id number]   YOB: 1940   Date of Visit: 3/17/2020       [facility] Admission note      Patient: Avi Villafana  MRN: [id number]  Date of Service: 3/17/2020      [facility] [[id number]]  Reason for Visit     Chief Complaint   Patient presents with   ? H & P     Evaluation urgent secondary to profound hypotensive episode as well as wandering and confusional episodes reported with attempts at elopement  Code Status     Full code    Assessment     -Acute episode of hypotension with profoundly low blood pressures noted.  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 coli treated with antibiotics  -Hypophosphatemia corrected in the hospital  -History of Parkinson's disease  -Anxiety with depression  -BPH  -Hyperlipidemia  -Protein calorie malnutrition currently has a feeding tube  -Generalized weakness  -History of chronic low back pain  --History of anxiety and depression  -Gait instability patient remains a high fall risk    Plan     Patient has been admitted to the TCU  for strengthening and rehab.  He is status post esophagogastrectomy and feeding tube jejunostomy pyloroplasty and diagnostic laparoscopy due to underlying history of esophageal cancer status post chemoradiation.  He will do an outpatient follow-up with his surgeon.  UTI has been treated  He had electrolyte abnormalities  including low phosphorus which has been corrected.  Delirium was felt to be secondary to disruption sleep-wake cycle with recent surgery.    He was given Seroquel in the hospital.  Hypoxic respiratory failure has resolved he required 1 L of oxygen in the hospital.  Postoperative pain management was optimized and he has been discharged to the TCU for strengthening and rehab   for anxiety and depression he remains on Lexapro  Unfortunately he had to be seen urgently today because of severe hypertensive episode.  Blood pressures were low in the systolics in the 70s he became dizzy lightheaded and felt he was going to pass out.  Patient was brought back to his room and fluids were pushed with more flushing through his G-tube.  Recheck blood pressures improved to 92/56 with improvement noted.  He is noted to be hypotensive since admission.  Suspect partly due to deconditioning.  He has an underlying history of Parkinson's.  Medication review was done and he a is not on any antihypertensives   however he is on Flomax and finasteride.  We will shift administration of these medications to the evening.  Consider YOSSI hoses if blood pressures continue to be persistent low.  Staff also requested to check orthostatic blood pressures.  Check stat labs to rule out any infection  Also continue to monitor mood and behaviors closely.  He does have confusion episodes with elopement risk and has a wander guard placed on him right now  Cognitive scores were reviewed with therapy and his BMs was 11/15 slums is 19/30 indicating mild to moderate impairment.  He does not carry a formal diagnosis dementia.  Continue to monitor mood and behaviors.  If he continues to be impaired consider adding psychotropic medications to his regimen.  He was delirious in the hospital and was advised to maintain his sleep-wake cycle  Also advised to minimize narcotics  Seroquel 12.5 mg available at bedtime as needed staff to update if this is effective or  not  He does remain a falls risk and is ambulating using a walker continue with his PT OT and rehab      History     Patient is a very pleasant 80 y.o. male who is admitted to TCU  Patient was admitted to the hospital and underwent diagnostic laparoscopic esophagogastrectomy on 2/26/2020.  Patient did well postoperatively.  And has been discharged with a G-tube in place.    Postoperative course however complicated by delirium requiring sitter.  Eventually medical status has stabilized and he has been discharged to the TCU.  Was given Seroquel in the hospital for delirium  Unfortunately is not doing well in the TCU staff requested an evaluation because of sundowning with persistent elopement events.  He frequently will be found wandering.  He has been trying to  his belongings and leave and has no recollection of those events these are mainly happening in the evening.    He has underlying history of Parkinson's and continues with his Sinemet.    He also had developed acute cystitis with work-up revealing more than 100,000 colonies of E. coli he was given IV antibiotics in the hospital.  Had some acute pulmonary insufficiency which resolved he required 1 L of oxygen in the hospital    Also had an acute episode of hypertension  Patient is not on any antihypertensives and became dizzy and lightheaded and was ready to pass out.  Blood pressures were quite low he was given aggressive fluid hydration recheck blood pressures were 93/57      Past Medical History     Active Ambulatory (Non-Hospital) Problems    Diagnosis   ? Pain   ? ACP (advance care planning)   ? Delirium   ? Esophageal cancer (H)   ? Adverse effect of other drugs, medicaments and biological substances, initial encounter   ? Encounter for antineoplastic chemotherapy   ? Iron deficiency anemia due to chronic blood loss   ? Adverse drug effect   ? Adenocarcinoma of esophagus (H)   ? Profound anemia   ? Orthostatic dizziness   ? BPH (benign prostatic  hyperplasia)   ? Parkinsonism (H)   ? Sacroiliitis (H)   ? Low back pain   ? Lipid disorder   ? Essential hypertension   ? Anxiety   ? GERD (gastroesophageal reflux disease)     Past Medical History:   Diagnosis Date   ? Adenocarcinoma of esophagus (H) 8/9/2019   ? Anxiety    ? BPH (benign prostatic hyperplasia)    ? Essential hypertension    ? Family history of myocardial infarction    ? GERD (gastroesophageal reflux disease)    ? High cholesterol    ? History of anesthesia complications    ? History of transfusion    ? Iron deficiency anemia due to chronic blood loss    ? Lipid disorder    ? Low back pain    ? Orthostatic dizziness    ? Parkinson disease (H)    ? Profound anemia    ? Sacroiliitis (H)        Past Social History     Reviewed, and he  reports that he quit smoking about 37 years ago. He has never used smokeless tobacco. He reports current alcohol use of about 3.0 standard drinks of alcohol per week. He reports that he does not use drugs.    Family History     Reviewed, and family history includes Heart disease in his mother.    Medication List   Post Discharge Medication Reconciliation Status: discharge medications reconciled and changed, per note/orders (see AVS)   Current Outpatient Medications on File Prior to Visit   Medication Sig Dispense Refill   ? acetaminophen (TYLENOL) 500 MG tablet Take 2 tablets (1,000 mg total) by mouth every 6 (six) hours as needed for pain or fever.  0   ? bisacodyL (DULCOLAX) 10 mg suppository Insert suppository rectally daily as needed for treatment of constipation.  0   ? carbidopa-levodopa (SINEMET)  mg per tablet Take 1 tablet by mouth 3 (three) times a day. Take at 7am, noon, and 5-7pm     ? escitalopram oxalate (LEXAPRO) 5 MG tablet Take 1 tablet by mouth daily.     ? finasteride (PROSCAR) 5 mg tablet Take 5 mg by mouth daily.     ? lidocaine-prilocaine (EMLA) cream Apply 1 application topically as needed. Apply to port 1/2 to 1 hour prior to accessing  port.     ? magnesium hydroxide (MILK OF MAG) 400 mg/5 mL Susp suspension Take 30 mL by mouth daily as needed.  0   ? omeprazole 20 mg TbEC Take 20 mg by mouth daily.     ? ondansetron (ZOFRAN) 4 MG tablet Take 4 mg by mouth every 6 (six) hours as needed for nausea.     ? oxyCODONE (ROXICODONE) 5 MG immediate release tablet 0.5 tablets (2.5 mg total) by J-Tube route every 8 (eight) hours as needed for pain. 13 tablet 0   ? potassium citrate (UROCIT-K) 10 mEq (1,080 mg) SR tablet Take 20 mEq by mouth 2 (two) times a day.     ? QUEtiapine (SEROQUEL) 25 MG tablet Take 0.5 tablets (12.5 mg total) by mouth at bedtime as needed (agitation).  0   ? rosuvastatin (CRESTOR) 20 MG tablet Take 20 mg by mouth at bedtime.      ? tamsulosin (FLOMAX) 0.4 mg Cp24 Take 0.4 mg by mouth Daily after breakfast.              No current facility-administered medications on file prior to visit.        Allergies     Allergies   Allergen Reactions   ? Latex      Added based on information entered during case entry, please review and add reactions, type, and severity as needed   ? Latex Rash       Review of Systems   A comprehensive review of 14 systems was done. Pertinent findings noted here and in history of present illness. All the rest negative.  Constitutional: Negative.  Negative for fever, chills, he has activity change, appetite change and fatigue.   HENT: Negative for congestion and facial swelling.    Eyes: Negative for photophobia, redness and visual disturbance.   Respiratory: Negative for cough and chest tightness.    Cardiovascular: Negative for chest pain, palpitations and leg swelling.   Gastrointestinal: Negative for nausea, diarrhea, constipation, blood in stool and abdominal distention.   Genitourinary: Negative.    Musculoskeletal: Negative.  Ambulating using a walker but reporting dizziness and lightheadedness  Skin: Negative.    Neurological: Negative for dizziness, tremors, syncope, weakness, light-headedness and  headaches.   Hematological: Does not bruise/bleed easily.   Psychiatric/Behavioral: Negative.  Recall issues noted some confusion      Physical Exam     Recent Vitals 3/11/2020   Height -   Weight 190 lbs   BSA (m2) 2.09 m2   /78   Pulse 78   Temp 98.6   Temp src -   SpO2 -   Some recent data might be hidden     Initial blood pressures when patient became symptomatic were low in the 70s systolic recheck was 93/57 post fluid administration  Constitutional: Oriented to person, place, and time and appears well-developed.   HEENT:  Normocephalic and atraumatic.  Eyes: Conjunctivae and EOM are normal. Pupils are equal, round, and reactive to light. No discharge.  No scleral icterus. Nose normal. Mouth/Throat: Oropharynx is clear and moist. No oropharyngeal exudate.    NECK: Normal range of motion. Neck supple. No JVD present. No tracheal deviation present. No thyromegaly present.   CARDIOVASCULAR: Normal rate, regular rhythm and intact distal pulses.  Exam reveals no gallop and no friction rub.  Systolic murmur present.  PULMONARY: Effort normal and breath sounds normal. No respiratory distress.No Wheezing or rales.  ABDOMEN: Soft. Bowel sounds are normal. No distension and no mass.  There is no tenderness. There is no rebound and no guarding. No HSM.  Midline surgical incision intact with staples noted   GJ tube noted in the left upper quadrant  MUSCULOSKELETAL: Normal range of motion. No edema and no tenderness. Mild kyphosis, no tenderness.  LYMPH NODES: Has no cervical, supraclavicular, axillary and groin adenopathy.   NEUROLOGICAL: Alert and oriented to person, place, and time. No cranial nerve deficit.  Normal muscle tone. Coordination normal.   GENITOURINARY: Deferred exam.  SKIN: Skin is warm and dry. No rash noted. No erythema. No pallor.   EXTREMITIES: No cyanosis, no clubbing, no edema. No Deformity.  PSYCHIATRIC: Normal mood, affect and behavior.  Impaired recall; has episodes of confusion and  sundowning in the evening with multiple elopement events reported      Lab Results     Recent Results (from the past 240 hour(s))   Phosphorus    Collection Time: 03/07/20  3:48 PM   Result Value Ref Range    Phosphorus 3.0 2.5 - 4.5 mg/dL   Phosphorus Level > 2.4 no replacement required    Collection Time: 03/08/20  6:47 AM   Result Value Ref Range    Phosphorus 2.6 2.5 - 4.5 mg/dL   Phosphorus Level > 2.4 no replacement required    Collection Time: 03/09/20  7:51 AM   Result Value Ref Range    Phosphorus 2.5 2.5 - 4.5 mg/dL   Creatinine    Collection Time: 03/09/20  7:51 AM   Result Value Ref Range    Creatinine 0.75 0.70 - 1.30 mg/dL    GFR MDRD Af Amer >60 >60 mL/min/1.73m2    GFR MDRD Non Af Amer >60 >60 mL/min/1.73m2            Imaging Results     Xr Chest 1 View Portable    Result Date: 3/5/2020  EXAM: XR CHEST 1 VIEW PORTABLE LOCATION: [facility] DATE/TIME: 3/5/2020 6:29 AM INDICATION: Postop follow-up. Esophageal gastrectomy. Chest tubes. COMPARISON: 03/04/2020. FINDINGS: Upright portable chest. 2 right chest tubes remain in place. There is a small right apical pneumothorax which is not completely included on the image but measures approximately 1 cm. Right chest wall port. The heart is enlarged. There are postoperative changes of gastric pull-through. Mild bibasilar infiltrates have increased in the interval. The heart is at the upper limits normal in size. No pulmonary edema.     Small right apical pneumothorax.       Xr Chest 1 View Portable    Result Date: 3/4/2020  EXAM: XR CHEST 1 VIEW PORTABLE LOCATION: [facility] DATE/TIME: 3/4/2020 9:18 AM INDICATION: Postop follow-up. Esophageal gastrectomy. Chest tubes. COMPARISON: 03/03/2020     No change in right-sided chest tube or right subclavian Chemo-Port. Heart and mediastinal size are normal. Pulmonary vascular congestion is similar to prior study. There remains some patchy opacity at  the left lung base, similar to prior study.  Question of a trace left pleural effusion, stable.    Xr Chest 1 View Portable    Result Date: 3/3/2020  EXAM: XR CHEST 1 VIEW PORTABLE LOCATION: [facility] DATE/TIME: 3/3/2020 8:05 AM INDICATION: Postop follow-up. Esophageal gastrectomy. Chest tubes. COMPARISON: 03/02/2020 and older studies.     Postthoracotomy changes on the right and gastric pull-through. 3 right-sided chest tubes subclavian Port-A-Cath. No evidence of a pneumothorax. Minimal atelectasis in the left base with likely trace effusion, unchanged. Right lung is clear. Heart and pulmonary vascularity are normal.    Xr Chest 1 View Portable    Result Date: 3/2/2020  EXAM: XR CHEST 1 VIEW PORTABLE LOCATION: [facility] DATE/TIME: 3/2/2020 9:54 AM INDICATION: Postop follow-up. Esophageal gastrectomy. Chest tubes. COMPARISON: 3/1/2020.     Postop change in the chest. 2 right chest tubes in place. No pneumothorax. Port-A-Cath tip in the SVC. Some minimal atelectasis left lower lobe. Lungs otherwise clear and expanded.    Xr Chest 1 View Portable    Result Date: 3/1/2020  EXAM: XR CHEST 1 VIEW PORTABLE LOCATION: [facility] DATE/TIME: 3/1/2020 5:55 AM INDICATION: chest tubes COMPARISON: 02/29/2020     Right Port-A-Cath in SVC. 2 right chest tubes. Recent postsurgical change. Atelectasis or infiltrate left lung base. Small left effusion.    Xr Chest 1 View Portable    Result Date: 2/29/2020  EXAM: XR CHEST 1 VIEW PORTABLE LOCATION: [facility] DATE/TIME: 2/29/2020 8:49 AM INDICATION: chest tubes COMPARISON: 02/27/2020     There is a right chest wall Port-A-Cath. 2 right lateral approach chest tubes have not changed in position. No pneumothorax. Minimal right pleural thickening is again seen. No definite right pleural fluid. Atelectasis in the medial right lower lung has not changed. There is a small volume of left pleural fluid with atelectasis. Normal size of the heart.     Xr Chest 1 View Portable    Result Date:  2/27/2020  EXAM: XR CHEST 1 VIEW PORTABLE LOCATION: [facility] DATE/TIME: 2/27/2020 5:54 AM INDICATION: Chest tube. COMPARISON: 02/26/2020     Stable cardiomediastinal silhouette. Enteric tube, right Port-A-Cath and 2 right chest tubes. Pulmonary venous congestion. Bilateral lower lung atelectasis or infiltrate. Small left effusion. Tiny right basilar pneumothorax difficult to exclude. Soft tissue emphysema right chest.    Xr Gastrografin Esophagram    Result Date: 3/2/2020  EXAM: XR GASTROGRAFIN ESOPHAGRAM LOCATION: [facility] DATE/TIME: 3/2/2020 12:20 PM INDICATION: Postop distal esophagectomy and partial gastrectomy with gastric pull-through. Evaluate anastomosis for leaks. COMPARISON: None. TECHNIQUE: Routine. FINDINGS: FLUOROSCOPIC TIME: 1.4 minutes NUMBER OF IMAGES: 9 ESOPHAGUS: Postop change of distal esophagectomy with gastric pull-through. No leaks at the esophagogastric anastomosis. Contrast flows through the remainder of the stomach into the proximal duodenum.     CONCLUSION: 1.  Postop change. 2.  No leaks at the anastomosis.    Xr Chest 1 View    Result Date: 2/26/2020  EXAM: XR CHEST 1 VIEW LOCATION: [facility] DATE/TIME: 2/26/2020 3:06 PM INDICATION: CT. COMPARISON: 8/26/2019     There is a right Port-A-Cath, an NG tube with its tip in the stomach, and two right-sided chest tubes. The patient had an esophagogastrectomy, there is a small amount of free air under the right diaphragm. A pneumothorax is not seen. There is  slight blunting of both costophrenic angles. The right lung is clear. There is some left retrocardiac opacity partially obscuring the diaphragm. This may be atelectasis rather than infiltrate.    Xr Ng Tube Insertion    Result Date: 2/27/2020  EXAM: XR NG TUBE INSERTION LOCATION: [facility] DATE/TIME: 2/27/2020 1:37 PM INDICATION: Status post esophagectomy with gastric pull-through. NG tube follow-up. Patient requires gastric decompression to  prevent aspiration. COMPARISON: None. TECHNIQUE: Routine. FINDINGS: Enteric tube placed without complication.?Tip in the intrathoracic stomach at the level of the gastric body. FLUOROSCOPIC TIME: 1.0 minutes. NUMBER OF IMAGES: 1. Reference CPT Code: 72418             ZAFAR Musa

## 2021-06-20 NOTE — LETTER
"Letter by Maribel Jain CNP at      Author: Maribel Jain CNP Service: -- Author Type: --    Filed:  Encounter Date: 3/10/2020 Status: (Other)         Patient: Avi Villafana   MR Number: [id number]   YOB: 1940   Date of Visit: 3/10/2020       [facility]      NAME:  Avi Villafana             :  1940    MRN: [id number]    CODE STATUS:  FULL CODE    FACILITY: [facility] [[id number]]         CHIEF COMPLAIN/REASON FOR VISIT:  Chief Complaint   Patient presents with   ? Review Of Multiple Medical Conditions     see HPI       HISTORY OF PRESENT ILLNESS: Avi Villafana is a 80 y.o. male being seen seen today on the TCU for review of multiple medical condiitons s/p hospitalization for esophageal CA. He was at [facility] from  to 3/9 2020 . Per his EMR, \"with a medical history significant for Parkinson disease, dyslipidemia, anxiety and depression who underwent planned esophagogastrectomy for treatment of adenocarcinoma of the esophagus on .  He is discharged to a skilled nursing facility for ongoing rehabilitative therapy.\". He is up in a chair in common area looking at [employer] new paper. Reports  and lives in [place]. He does have Parkinsons, frozen face like mask and speech hesitant. He reports some pain to abdominal incisional site, but med regime manages pain. We reviewed MCS role in his care on TCU, TCU routines, med review and pain management. Also discussed code status and ACP as this is an expected need to have POLST signed on the TCU.    Allergies   Allergen Reactions   ? Latex      Added based on information entered during case entry, please review and add reactions, type, and severity as needed   ? Latex Rash   :     Current Outpatient Medications   Medication Sig   ? acetaminophen (TYLENOL) 500 MG tablet Take 2 tablets (1,000 mg total) by mouth every 6 (six) hours as needed for pain or fever.   ? bisacodyL (DULCOLAX) " 10 mg suppository Insert suppository rectally daily as needed for treatment of constipation.   ? carbidopa-levodopa (SINEMET)  mg per tablet Take 1 tablet by mouth 3 (three) times a day. Take at 7am, noon, and 5-7pm   ? escitalopram oxalate (LEXAPRO) 5 MG tablet Take 1 tablet by mouth daily.   ? finasteride (PROSCAR) 5 mg tablet Take 5 mg by mouth daily.   ? lidocaine-prilocaine (EMLA) cream Apply 1 application topically as needed. Apply to port 1/2 to 1 hour prior to accessing port.   ? magnesium hydroxide (MILK OF MAG) 400 mg/5 mL Susp suspension Take 30 mL by mouth daily as needed.   ? omeprazole 20 mg TbEC Take 20 mg by mouth daily.   ? ondansetron (ZOFRAN) 4 MG tablet Take 4 mg by mouth every 6 (six) hours as needed for nausea.   ? oxyCODONE (ROXICODONE) 5 MG immediate release tablet 0.5 tablets (2.5 mg total) by J-Tube route every 8 (eight) hours as needed for pain.   ? potassium citrate (UROCIT-K) 10 mEq (1,080 mg) SR tablet Take 20 mEq by mouth 2 (two) times a day.   ? QUEtiapine (SEROQUEL) 25 MG tablet Take 0.5 tablets (12.5 mg total) by mouth at bedtime as needed (agitation).   ? rosuvastatin (CRESTOR) 20 MG tablet Take 20 mg by mouth at bedtime.    ? tamsulosin (FLOMAX) 0.4 mg Cp24 Take 0.4 mg by mouth Daily after breakfast.                REVIEW OF SYSTEMS:    Currently, no fever, chills, or rigors. Does not have any visual or hearing problems. Denies any chest pain, headaches, palpitations, lightheadedness, dizziness, shortness of breath, or cough. Appetite is good. Denies any GERD symptoms. Denies any difficulty with swallowing, nausea, or vomiting.  Denies any abdominal pain, diarrhea or constipation. Denies any urinary symptoms. No insomnia. No active bleeding. No rash.       PHYSICAL EXAMINATION:  Vitals:    03/11/20 0402   BP: 121/71   Pulse: 88   Temp: 98.1  F (36.7  C)   Weight: 194 lb (88 kg)         GENERAL: Awake, Alert, oriented x3, not in any form of acute distress, answers questions  appropriately, follows simple commands, conversant  HEENT: Head is normocephalic with normal hair distribution. No evidence of trauma. Ears: No acute purulent discharge. Eyes: Conjunctivae pink with no scleral jaundice. Nose: Normal mucosa and septum. NECK: Supple with no cervical or supraclavicular lymphadenopathy. Trachea is midline.   CHEST: No tenderness or deformity, no crepitus  LUNG: Clear to auscultation with good chest expansion. There are no crackles or wheezes, normal AP diameter.  BACK: No kyphosis of the thoracic spine. Symmetric, no curvature, ROM normal, no CVA tenderness, no spinal tenderness   CVS: There is good S1  S2,rhythm is regular.  ABDOMEN: Soft, + BS.UMBILICUS INCISION WITH STABLES, NO DRAINAGE, RIGHT FLANK AREA DRESSING S/P ct, gTUBE IN PLACE.  EXTREMITIES: Atraumatic. Full range of motion on both upper and lower extremities, there is no tenderness to palpation, no pedal edema, no cyanosis or clubbing, no calf tenderness, normal cap refill, no joint swelling.  SKIN: Warm and dry, no erythema noted, no rashes or lesions.  NEUROLOGICAL: The patient is oriented to person, place and time. Generalized weakness, Parkinsons, hesitant speech  Fine tremor        LABS:    Lab Results   Component Value Date    WBC 9.5 02/28/2020    HGB 10.6 (L) 02/28/2020    HCT 32.0 (L) 02/28/2020    MCV 90 02/28/2020     03/03/2020       Results for orders placed or performed in visit on 02/10/20   Basic Metabolic Panel   Result Value Ref Range    Sodium 143 136 - 145 mmol/L    Potassium 4.3 3.5 - 5.0 mmol/L    Chloride 108 (H) 98 - 107 mmol/L    CO2 25 22 - 31 mmol/L    Anion Gap, Calculation 10 5 - 18 mmol/L    Glucose 87 70 - 125 mg/dL    Calcium 9.0 8.5 - 10.5 mg/dL    BUN 20 8 - 28 mg/dL    Creatinine 1.12 0.70 - 1.30 mg/dL    GFR MDRD Af Amer >60 >60 mL/min/1.73m2    GFR MDRD Non Af Amer >60 >60 mL/min/1.73m2           No results found for: HGBA1C  No results found for: GZYVXYTW24YP  Lab Results    Component Value Date    ZDVQVAAJ08 323 08/06/2019       ASSESSMENT/PLAN:  1. Adenocarcinoma of esophagus (H)    2. Parkinsonism, unspecified Parkinsonism type (H)    3. ACP (advance care planning)      1. Esophageal CA: S/P surgery, weakened condition. Will need SN for pain management, wound management and chronic medical condition management. PT/OT for therapies due to ongoing therapies due to decondtioned state. FU with surgeon and oncology.    2. Parkinsons: FU with current med management, is on sinemet, fine tremor notes, hesitant speech. As per number 1, will need therapies due to deconditioned state.    3. ACP: Reviewed ACP with pt today and POLST signed as per pt desires to be full code        Electronically signed by:  Maribel Jain CNP  This progress note was completed using Dragon software and there may be grammatical errors.    45  minutes spent of which greater than  75% was face to face communication with the patient about above plan of care which included Code status, MCS role in his care on the TCU, pain management and TCU routines this am.

## 2021-06-28 NOTE — PROGRESS NOTES
Progress Notes by Maribel Jain CNP at 3/25/2020  9:14 AM     Author: Maribel Jain CNP Service: -- Author Type: Nurse Practitioner    Filed: 3/25/2020  7:35 PM Encounter Date: 3/25/2020 Status: Attested    : Maribel Jain CNP (Nurse Practitioner) Cosigner: Yumiko Rueda MBBS at 3/26/2020  9:15 AM    Attestation signed by Yumiko Rueda MBBS at 3/26/2020  9:15 AM    Agree with discharge note                [facility]      NAME:  Avi Villafana             :  1940  MRN: [id number]  CODE STATUS:  FULL CODE    VISIT TYPE: DISCHARGE SUMMARY  FACILYTY: [facility] [[id number]]    HOSPITALIZATION:   [facility] from  to 3/9 2020                 PRIMARY CARE PROVIDER: Tad Tovar MD    DISCHARGE DIAGNOSIS:      1. Adenocarcinoma of esophagus (H)    2. Malignant neoplasm of esophagus, unspecified location (H)    3. Pain         DISCHARGE MEDICATIONS:         Medication List          Accurate as of 2020  7:25 PM. If you have any questions, ask your nurse or doctor.            CHANGE how you take these medications    acetaminophen 500 MG tablet  Commonly known as:  TYLENOL  Take 2 tablets (1,000 mg total) by mouth every 6 (six) hours as needed for pain or fever.  What changed:      when to take this    additional instructions        CONTINUE taking these medications    bisacodyL 10 mg suppository  Commonly known as:  DULCOLAX  Insert suppository rectally daily as needed for treatment of constipation.     carbidopa-levodopa  mg per tablet  Commonly known as:  SINEMET     diclofenac sodium 1 % Gel  Commonly known as:  VOLTAREN     finasteride 5 mg tablet  Commonly known as:  PROSCAR     hydrOXYzine pamoate 25 MG capsule  Commonly known as:  VISTARIL     Lexapro 5 MG tablet  Generic drug:  escitalopram oxalate     lidocaine-prilocaine cream  Commonly known as:  EMLA     magnesium hydroxide 400 mg/5 mL Susp suspension  Commonly known as:   "MILK OF MAG  Take 30 mL by mouth daily as needed.     omeprazole 20 mg Tbec     ondansetron 4 MG tablet  Commonly known as:  ZOFRAN     oxyCODONE 5 MG immediate release tablet  Commonly known as:  ROXICODONE  0.5 tablets (2.5 mg total) by J-Tube route every 8 (eight) hours as needed for pain.     potassium citrate 10 mEq (1,080 mg) SR tablet  Commonly known as:  UROCIT-K     QUEtiapine 25 MG tablet  Commonly known as:  SEROquel  Take 0.5 tablets (12.5 mg total) by mouth at bedtime as needed (agitation).     rosuvastatin 20 MG tablet  Commonly known as:  CRESTOR     tamsulosin 0.4 mg Cap  Commonly known as:  FLOMAX            HISTORY OF PRESENT ILLNESS: Avi Villafana is a 80 y.o. male being seen for a face to face visit for an anticipated am dc.He requires [facility] for RN support.  His wife will need Gtube training prior to dc, but facility on [facility] as per MD so was not been able to train.. Nursing to train in am.  He was at [facility] from 2/26 to 3/9 2020 . Per his EMR, \"with a medical history significant for Parkinson disease, dyslipidemia, anxiety and depression who underwent planned esophagogastrectomy for treatment of adenocarcinoma of the esophagus on 2/26.  He is discharged to a skilled nursing facility for ongoing rehabilitative therapy.\". He is up in a chair in common area looking at [employer] new paper. Reports  and lives in [place]. He does have Parkinsons, frozen face like mask and speech hesitant. He reports pain improvement, is up as larry ambulating.    SKILLED NURSING FACILITY COURSE:  During this TCU stay, patient completed all anticipated goals of therapy.      PHYSICAL EXAMINATION:    Vitals:    03/25/20 1854   BP: 127/80   Pulse: 82   Temp: 98.5  F (36.9  C)   Weight: 189 lb 3.2 oz (85.8 kg)         GENERAL: Awake, Alert, oriented x3, not in any form of acute distress, answers questions appropriately, follows simple commands, conversant  HEENT: Head is normocephalic with normal " hair distribution. No evidence of trauma. Ears: No acute purulent discharge. Eyes: Conjunctivae pink with no scleral jaundice. Nose: Normal mucosa and septum. NECK: Supple with no cervical or supraclavicular lymphadenopathy. Trachea is midline.   CHEST: No tenderness or deformity, no crepitus  LUNG: Clear to auscultation with good chest expansion. There are no crackles or wheezes, normal AP diameter.  BACK: No kyphosis of the thoracic spine. Symmetric, no curvature, ROM normal, no CVA tenderness, no spinal tenderness   CVS: There is good S1  S2,  rhythm is regular.  ABDOMEN: Midline umbilicus incision well healed and aproximated, he has Gtube on left side of abdomen, dressed.  EXTREMITIES: Atraumatic. Full range of motion on both upper and lower extremities, there is no tenderness to palpation, no pedal edema, no cyanosis or clubbing, no calf tenderness, normal cap refill, no joint swelling.  SKIN: Warm and dry, no erythema noted, no rashes or lesions.  NEUROLOGICAL: The patient is oriented to person, placeStrength and sensation are grossly intact. Face is symmetric.      LABS:  All labs reviewed in the nursing home record.        DISCHARGE PLAN: I certify that this patient is under Dr. Rueda's care, seen by the NP, and had a face-to-face encounter that meets the physician face-to-face encounter requirements.  The encounter was in whole, or part related to the primary reason for home health.  The Patient is homebound due to: General deconditioned state sp surgery for esouphegeal CA and i it is  taxing and it will take a considerable amount of effort for patient to leave the home.  He is dependent on others for transportation.  The patient is confined to his home and needs intermittent skilled nursin RN,  The patient has been under the care of Dr. Rueda/NP and Dr. Rueda  initiated the establishment of the plan of care.        Patient to be followed by home care for physical therapy to eval and treat for  strengthening, balance, endurance, and safety with mobility, and ambulation.  Patient to be followed by home care for occupational therapy to eval and treat for strengthening, ADL needs, adaptive equipment, and safety.  Patient to be followed by home care for nursing services for medication set up and teaching, symptom and disease processes monitoring and education.    Patient to be followed by home care for home health aid services for bathing and ADL needs.  Planned discharge.  All therapy goals have been met.  Family will assist with discharge and transportation.        Patient will follow up with PCP within 7- days after discharge for medication mangagment and appropriate lab studies.    Post Discharge Medication Reconciliation Status: discharge medications reconciled and changed, per note/orders (see AVS)  Electronically signed by:  Maribel Jain CNP  This progress note was completed using Dragon software and there may be grammatical errors.      For documentation purposes, chart review, medication management, and discharge coordination of care was greater than 35 minutes

## 2021-07-03 NOTE — ANESTHESIA PREPROCEDURE EVALUATION
Anesthesia Preprocedure Evaluation by Albertina Trevino MD at 2/26/2020  9:24 AM     Author: Albertina Trevino MD Service: -- Author Type: Physician    Filed: 2/26/2020  9:27 AM Date of Service: 2/26/2020  9:24 AM Status: Signed    : Albertina Trevino MD (Physician)       Anesthesia Evaluation      Patient summary reviewed   History of anesthetic complications (confusion after a previous anesthetic)     Airway   Mallampati: I  Neck ROM: full   Pulmonary                           Cardiovascular   Exercise tolerance: > or = 4 METS  (+) hypertension, , hypercholesterolemia,     Rhythm: regular  Rate: normal,      ROS comment: H/o anemia     Neuro/Psych    (+) Parkinson's disease (Needs dose at noon),     Endo/Other       Comments: BPH    GI/Hepatic/Renal    (+) GERD,        Other findings: Hairpiece- will remove prior to OR      Dental - normal exam                            Anesthesia Plan  Planned anesthetic: general endotracheal, epidural and total IV anesthesia  Need repeat labs this AM before placement of epidural  A-line prior to induction  Large bore IV after induction  Dose of parkinson's medication before OR today  ASA 4   Induction: intravenous   Anesthetic plan and risks discussed with: patient  Anesthesia plan special considerations: antiemetics, arterial catheterization, IV therapy two IVs,   Post-op plan: routine recovery

## 2021-07-14 PROBLEM — K92.1 MELENA: Status: RESOLVED | Noted: 2019-08-06 | Resolved: 2020-01-01

## 2022-11-09 NOTE — Clinical Note
So as you probably recall, he was started on Sinemet  TID at another clinic. His symptoms appear to be quite mild. I know he's doing well on this dose, but is there merit in reducing the dose? Have you had other patients in this situation? 
I attest my time as attending is greater than 50% of the total combined time spent on qualifying patient care activities by the PA/NP and attending.